# Patient Record
Sex: MALE | Race: WHITE | NOT HISPANIC OR LATINO | Employment: OTHER | URBAN - METROPOLITAN AREA
[De-identification: names, ages, dates, MRNs, and addresses within clinical notes are randomized per-mention and may not be internally consistent; named-entity substitution may affect disease eponyms.]

---

## 2019-10-03 ENCOUNTER — LAB VISIT (OUTPATIENT)
Dept: LAB | Facility: HOSPITAL | Age: 74
End: 2019-10-03
Attending: INTERNAL MEDICINE
Payer: MEDICARE

## 2019-10-03 DIAGNOSIS — Z79.899 NEED FOR PROPHYLACTIC CHEMOTHERAPY: ICD-10-CM

## 2019-10-03 DIAGNOSIS — I10 ESSENTIAL HYPERTENSION, MALIGNANT: Primary | ICD-10-CM

## 2019-10-03 DIAGNOSIS — E11.9 DIABETES MELLITUS WITHOUT COMPLICATION: ICD-10-CM

## 2019-10-03 DIAGNOSIS — E78.2 MIXED HYPERLIPIDEMIA: ICD-10-CM

## 2019-10-03 LAB
ALBUMIN SERPL BCP-MCNC: 4.1 G/DL (ref 3.5–5.2)
ALBUMIN/CREAT UR: 2.4 UG/MG (ref 0–30)
ALP SERPL-CCNC: 58 U/L (ref 55–135)
ALT SERPL W/O P-5'-P-CCNC: 24 U/L (ref 10–44)
ANION GAP SERPL CALC-SCNC: 4 MMOL/L (ref 8–16)
AST SERPL-CCNC: 22 U/L (ref 10–40)
BILIRUB DIRECT SERPL-MCNC: 0.2 MG/DL (ref 0.1–0.3)
BILIRUB SERPL-MCNC: 1.6 MG/DL (ref 0.1–1)
BUN SERPL-MCNC: 15 MG/DL (ref 8–23)
CALCIUM SERPL-MCNC: 9.2 MG/DL (ref 8.7–10.5)
CHLORIDE SERPL-SCNC: 104 MMOL/L (ref 95–110)
CHOLEST SERPL-MCNC: 135 MG/DL (ref 120–199)
CHOLEST/HDLC SERPL: 2.8 {RATIO} (ref 2–5)
CO2 SERPL-SCNC: 29 MMOL/L (ref 23–29)
CREAT SERPL-MCNC: 0.7 MG/DL (ref 0.5–1.4)
CREAT UR-MCNC: 90 MG/DL (ref 23–375)
EST. GFR  (AFRICAN AMERICAN): >60 ML/MIN/1.73 M^2
EST. GFR  (NON AFRICAN AMERICAN): >60 ML/MIN/1.73 M^2
ESTIMATED AVG GLUCOSE: 111 MG/DL (ref 68–131)
GLUCOSE SERPL-MCNC: 105 MG/DL (ref 70–110)
HBA1C MFR BLD HPLC: 5.5 % (ref 4.5–6.2)
HDLC SERPL-MCNC: 48 MG/DL (ref 40–75)
HDLC SERPL: 35.6 % (ref 20–50)
LDLC SERPL CALC-MCNC: 68.6 MG/DL (ref 63–159)
MICROALBUMIN UR DL<=1MG/L-MCNC: 2.2 UG/ML
NONHDLC SERPL-MCNC: 87 MG/DL
POTASSIUM SERPL-SCNC: 4.4 MMOL/L (ref 3.5–5.1)
PROT SERPL-MCNC: 6.5 G/DL (ref 6–8.4)
SODIUM SERPL-SCNC: 137 MMOL/L (ref 136–145)
TRIGL SERPL-MCNC: 92 MG/DL (ref 30–150)

## 2019-10-03 PROCEDURE — 83036 HEMOGLOBIN GLYCOSYLATED A1C: CPT

## 2019-10-03 PROCEDURE — 36415 COLL VENOUS BLD VENIPUNCTURE: CPT

## 2019-10-03 PROCEDURE — 80076 HEPATIC FUNCTION PANEL: CPT

## 2019-10-03 PROCEDURE — 80061 LIPID PANEL: CPT

## 2019-10-03 PROCEDURE — 80048 BASIC METABOLIC PNL TOTAL CA: CPT

## 2019-10-03 PROCEDURE — 82043 UR ALBUMIN QUANTITATIVE: CPT

## 2020-02-24 ENCOUNTER — LAB VISIT (OUTPATIENT)
Dept: LAB | Facility: HOSPITAL | Age: 75
End: 2020-02-24
Attending: INTERNAL MEDICINE
Payer: MEDICARE

## 2020-02-24 DIAGNOSIS — E78.2 MIXED HYPERLIPIDEMIA: ICD-10-CM

## 2020-02-24 DIAGNOSIS — E83.42 HYPOMAGNESEMIA: ICD-10-CM

## 2020-02-24 DIAGNOSIS — I11.9 MALIGNANT HYPERTENSIVE HEART DISEASE WITHOUT HEART FAILURE: ICD-10-CM

## 2020-02-24 DIAGNOSIS — Z79.899 ENCOUNTER FOR LONG-TERM (CURRENT) USE OF OTHER MEDICATIONS: ICD-10-CM

## 2020-02-24 DIAGNOSIS — I10 ESSENTIAL HYPERTENSION, MALIGNANT: Primary | ICD-10-CM

## 2020-02-24 LAB
ALBUMIN SERPL BCP-MCNC: 3.9 G/DL (ref 3.5–5.2)
ALBUMIN/CREAT UR: 3.3 UG/MG (ref 0–30)
ALP SERPL-CCNC: 47 U/L (ref 55–135)
ALT SERPL W/O P-5'-P-CCNC: 19 U/L (ref 10–44)
ANION GAP SERPL CALC-SCNC: 7 MMOL/L (ref 8–16)
AST SERPL-CCNC: 17 U/L (ref 10–40)
BILIRUB DIRECT SERPL-MCNC: 0.1 MG/DL (ref 0.1–0.3)
BILIRUB SERPL-MCNC: 1.1 MG/DL (ref 0.1–1)
BUN SERPL-MCNC: 22 MG/DL (ref 8–23)
CALCIUM SERPL-MCNC: 9.1 MG/DL (ref 8.7–10.5)
CHLORIDE SERPL-SCNC: 106 MMOL/L (ref 95–110)
CHOLEST SERPL-MCNC: 137 MG/DL (ref 120–199)
CHOLEST/HDLC SERPL: 3 {RATIO} (ref 2–5)
CO2 SERPL-SCNC: 28 MMOL/L (ref 23–29)
CREAT SERPL-MCNC: 0.7 MG/DL (ref 0.5–1.4)
CREAT UR-MCNC: 114 MG/DL (ref 23–375)
EST. GFR  (AFRICAN AMERICAN): >60 ML/MIN/1.73 M^2
EST. GFR  (NON AFRICAN AMERICAN): >60 ML/MIN/1.73 M^2
ESTIMATED AVG GLUCOSE: 105 MG/DL (ref 68–131)
GLUCOSE SERPL-MCNC: 98 MG/DL (ref 70–110)
HBA1C MFR BLD HPLC: 5.3 % (ref 4.5–6.2)
HDLC SERPL-MCNC: 46 MG/DL (ref 40–75)
HDLC SERPL: 33.6 % (ref 20–50)
LDLC SERPL CALC-MCNC: 65.4 MG/DL (ref 63–159)
MAGNESIUM SERPL-MCNC: 2 MG/DL (ref 1.6–2.6)
MICROALBUMIN UR DL<=1MG/L-MCNC: 3.8 UG/ML
NONHDLC SERPL-MCNC: 91 MG/DL
POTASSIUM SERPL-SCNC: 4.5 MMOL/L (ref 3.5–5.1)
PROT SERPL-MCNC: 6.5 G/DL (ref 6–8.4)
SODIUM SERPL-SCNC: 141 MMOL/L (ref 136–145)
TRIGL SERPL-MCNC: 128 MG/DL (ref 30–150)

## 2020-02-24 PROCEDURE — 36415 COLL VENOUS BLD VENIPUNCTURE: CPT

## 2020-02-24 PROCEDURE — 82043 UR ALBUMIN QUANTITATIVE: CPT

## 2020-02-24 PROCEDURE — 83735 ASSAY OF MAGNESIUM: CPT

## 2020-02-24 PROCEDURE — 80048 BASIC METABOLIC PNL TOTAL CA: CPT

## 2020-02-24 PROCEDURE — 80061 LIPID PANEL: CPT

## 2020-02-24 PROCEDURE — 80076 HEPATIC FUNCTION PANEL: CPT

## 2020-02-24 PROCEDURE — 83036 HEMOGLOBIN GLYCOSYLATED A1C: CPT

## 2020-08-31 ENCOUNTER — LAB VISIT (OUTPATIENT)
Dept: LAB | Facility: HOSPITAL | Age: 75
End: 2020-08-31
Attending: INTERNAL MEDICINE
Payer: MEDICARE

## 2020-08-31 DIAGNOSIS — E78.2 MIXED HYPERLIPIDEMIA: ICD-10-CM

## 2020-08-31 DIAGNOSIS — Z79.899 ENCOUNTER FOR LONG-TERM (CURRENT) USE OF OTHER MEDICATIONS: ICD-10-CM

## 2020-08-31 DIAGNOSIS — E11.9 DIABETES MELLITUS WITHOUT COMPLICATION: ICD-10-CM

## 2020-08-31 DIAGNOSIS — I10 ESSENTIAL HYPERTENSION, MALIGNANT: Primary | ICD-10-CM

## 2020-08-31 LAB
ALBUMIN SERPL BCP-MCNC: 4 G/DL (ref 3.5–5.2)
ALBUMIN/CREAT UR: 4 UG/MG (ref 0–30)
ALP SERPL-CCNC: 46 U/L (ref 55–135)
ALT SERPL W/O P-5'-P-CCNC: 24 U/L (ref 10–44)
ANION GAP SERPL CALC-SCNC: 9 MMOL/L (ref 8–16)
AST SERPL-CCNC: 20 U/L (ref 10–40)
BILIRUB DIRECT SERPL-MCNC: 0.2 MG/DL (ref 0.1–0.3)
BILIRUB SERPL-MCNC: 1.5 MG/DL (ref 0.1–1)
BUN SERPL-MCNC: 17 MG/DL (ref 8–23)
CALCIUM SERPL-MCNC: 9.3 MG/DL (ref 8.7–10.5)
CHLORIDE SERPL-SCNC: 106 MMOL/L (ref 95–110)
CHOLEST SERPL-MCNC: 135 MG/DL (ref 120–199)
CHOLEST/HDLC SERPL: 2.8 {RATIO} (ref 2–5)
CO2 SERPL-SCNC: 27 MMOL/L (ref 23–29)
CREAT SERPL-MCNC: 0.7 MG/DL (ref 0.5–1.4)
CREAT UR-MCNC: 134 MG/DL (ref 23–375)
EST. GFR  (AFRICAN AMERICAN): >60 ML/MIN/1.73 M^2
EST. GFR  (NON AFRICAN AMERICAN): >60 ML/MIN/1.73 M^2
ESTIMATED AVG GLUCOSE: 111 MG/DL (ref 68–131)
GLUCOSE SERPL-MCNC: 101 MG/DL (ref 70–110)
HBA1C MFR BLD HPLC: 5.5 % (ref 4.5–6.2)
HDLC SERPL-MCNC: 49 MG/DL (ref 40–75)
HDLC SERPL: 36.3 % (ref 20–50)
LDLC SERPL CALC-MCNC: 61.4 MG/DL (ref 63–159)
MICROALBUMIN UR DL<=1MG/L-MCNC: 5.4 UG/ML
NONHDLC SERPL-MCNC: 86 MG/DL
POTASSIUM SERPL-SCNC: 4.1 MMOL/L (ref 3.5–5.1)
PROT SERPL-MCNC: 6.3 G/DL (ref 6–8.4)
SODIUM SERPL-SCNC: 142 MMOL/L (ref 136–145)
TRIGL SERPL-MCNC: 123 MG/DL (ref 30–150)

## 2020-08-31 PROCEDURE — 82043 UR ALBUMIN QUANTITATIVE: CPT

## 2020-08-31 PROCEDURE — 80076 HEPATIC FUNCTION PANEL: CPT

## 2020-08-31 PROCEDURE — 80048 BASIC METABOLIC PNL TOTAL CA: CPT

## 2020-08-31 PROCEDURE — 36415 COLL VENOUS BLD VENIPUNCTURE: CPT

## 2020-08-31 PROCEDURE — 83036 HEMOGLOBIN GLYCOSYLATED A1C: CPT

## 2020-08-31 PROCEDURE — 80061 LIPID PANEL: CPT

## 2021-01-06 ENCOUNTER — PATIENT MESSAGE (OUTPATIENT)
Dept: ADMINISTRATIVE | Facility: OTHER | Age: 76
End: 2021-01-06

## 2021-01-22 ENCOUNTER — PATIENT MESSAGE (OUTPATIENT)
Dept: ADMINISTRATIVE | Facility: OTHER | Age: 76
End: 2021-01-22

## 2021-02-05 ENCOUNTER — IMMUNIZATION (OUTPATIENT)
Dept: PRIMARY CARE CLINIC | Facility: CLINIC | Age: 76
End: 2021-02-05
Payer: MEDICARE

## 2021-02-05 DIAGNOSIS — Z23 NEED FOR VACCINATION: Primary | ICD-10-CM

## 2021-02-05 PROCEDURE — 91300 COVID-19, MRNA, LNP-S, PF, 30 MCG/0.3 ML DOSE VACCINE: CPT | Mod: S$GLB,,, | Performed by: FAMILY MEDICINE

## 2021-02-05 PROCEDURE — 0001A COVID-19, MRNA, LNP-S, PF, 30 MCG/0.3 ML DOSE VACCINE: ICD-10-PCS | Mod: S$GLB,,, | Performed by: FAMILY MEDICINE

## 2021-02-05 PROCEDURE — 91300 COVID-19, MRNA, LNP-S, PF, 30 MCG/0.3 ML DOSE VACCINE: ICD-10-PCS | Mod: S$GLB,,, | Performed by: FAMILY MEDICINE

## 2021-02-05 PROCEDURE — 0001A COVID-19, MRNA, LNP-S, PF, 30 MCG/0.3 ML DOSE VACCINE: CPT | Mod: S$GLB,,, | Performed by: FAMILY MEDICINE

## 2021-02-19 ENCOUNTER — LAB VISIT (OUTPATIENT)
Dept: LAB | Facility: HOSPITAL | Age: 76
End: 2021-02-19
Attending: INTERNAL MEDICINE
Payer: MEDICARE

## 2021-02-19 DIAGNOSIS — I10 ESSENTIAL HYPERTENSION, MALIGNANT: Primary | ICD-10-CM

## 2021-02-19 DIAGNOSIS — Z12.5 SPECIAL SCREENING FOR MALIGNANT NEOPLASM OF PROSTATE: ICD-10-CM

## 2021-02-19 DIAGNOSIS — Z79.899 ENCOUNTER FOR LONG-TERM (CURRENT) USE OF OTHER MEDICATIONS: ICD-10-CM

## 2021-02-19 DIAGNOSIS — E78.2 MIXED HYPERLIPIDEMIA: ICD-10-CM

## 2021-02-19 DIAGNOSIS — E11.9 DIABETES MELLITUS WITHOUT COMPLICATION: ICD-10-CM

## 2021-02-19 LAB
ALBUMIN SERPL BCP-MCNC: 4.3 G/DL (ref 3.5–5.2)
ALBUMIN/CREAT UR: 6.9 UG/MG (ref 0–30)
ALP SERPL-CCNC: 53 U/L (ref 55–135)
ALT SERPL W/O P-5'-P-CCNC: 26 U/L (ref 10–44)
ANION GAP SERPL CALC-SCNC: 10 MMOL/L (ref 8–16)
AST SERPL-CCNC: 21 U/L (ref 10–40)
BILIRUB DIRECT SERPL-MCNC: 0.1 MG/DL (ref 0.1–0.3)
BILIRUB SERPL-MCNC: 1.3 MG/DL (ref 0.1–1)
BUN SERPL-MCNC: 18 MG/DL (ref 8–23)
CALCIUM SERPL-MCNC: 9.6 MG/DL (ref 8.7–10.5)
CHLORIDE SERPL-SCNC: 105 MMOL/L (ref 95–110)
CHOLEST SERPL-MCNC: 134 MG/DL (ref 120–199)
CHOLEST/HDLC SERPL: 2.9 {RATIO} (ref 2–5)
CO2 SERPL-SCNC: 27 MMOL/L (ref 23–29)
COMPLEXED PSA SERPL-MCNC: 0.92 NG/ML (ref 0–4)
CREAT SERPL-MCNC: 0.7 MG/DL (ref 0.5–1.4)
CREAT UR-MCNC: 155 MG/DL (ref 23–375)
EST. GFR  (AFRICAN AMERICAN): >60 ML/MIN/1.73 M^2
EST. GFR  (NON AFRICAN AMERICAN): >60 ML/MIN/1.73 M^2
ESTIMATED AVG GLUCOSE: 103 MG/DL (ref 68–131)
GLUCOSE SERPL-MCNC: 107 MG/DL (ref 70–110)
HBA1C MFR BLD: 5.2 % (ref 4.5–6.2)
HDLC SERPL-MCNC: 46 MG/DL (ref 40–75)
HDLC SERPL: 34.3 % (ref 20–50)
LDLC SERPL CALC-MCNC: 62.6 MG/DL (ref 63–159)
MICROALBUMIN UR DL<=1MG/L-MCNC: 10.7 UG/ML
NONHDLC SERPL-MCNC: 88 MG/DL
POTASSIUM SERPL-SCNC: 4.4 MMOL/L (ref 3.5–5.1)
PROT SERPL-MCNC: 7 G/DL (ref 6–8.4)
SODIUM SERPL-SCNC: 142 MMOL/L (ref 136–145)
TRIGL SERPL-MCNC: 127 MG/DL (ref 30–150)

## 2021-02-19 PROCEDURE — 80048 BASIC METABOLIC PNL TOTAL CA: CPT

## 2021-02-19 PROCEDURE — 82043 UR ALBUMIN QUANTITATIVE: CPT

## 2021-02-19 PROCEDURE — 36415 COLL VENOUS BLD VENIPUNCTURE: CPT

## 2021-02-19 PROCEDURE — 84153 ASSAY OF PSA TOTAL: CPT

## 2021-02-19 PROCEDURE — 82570 ASSAY OF URINE CREATININE: CPT

## 2021-02-19 PROCEDURE — 83036 HEMOGLOBIN GLYCOSYLATED A1C: CPT

## 2021-02-19 PROCEDURE — 80061 LIPID PANEL: CPT

## 2021-02-19 PROCEDURE — 80076 HEPATIC FUNCTION PANEL: CPT

## 2021-02-26 ENCOUNTER — IMMUNIZATION (OUTPATIENT)
Dept: PRIMARY CARE CLINIC | Facility: CLINIC | Age: 76
End: 2021-02-26
Payer: MEDICARE

## 2021-02-26 DIAGNOSIS — Z23 NEED FOR VACCINATION: Primary | ICD-10-CM

## 2021-02-26 PROCEDURE — 0002A COVID-19, MRNA, LNP-S, PF, 30 MCG/0.3 ML DOSE VACCINE: ICD-10-PCS | Mod: S$GLB,,, | Performed by: FAMILY MEDICINE

## 2021-02-26 PROCEDURE — 91300 COVID-19, MRNA, LNP-S, PF, 30 MCG/0.3 ML DOSE VACCINE: ICD-10-PCS | Mod: S$GLB,,, | Performed by: FAMILY MEDICINE

## 2021-02-26 PROCEDURE — 91300 COVID-19, MRNA, LNP-S, PF, 30 MCG/0.3 ML DOSE VACCINE: CPT | Mod: S$GLB,,, | Performed by: FAMILY MEDICINE

## 2021-02-26 PROCEDURE — 0002A COVID-19, MRNA, LNP-S, PF, 30 MCG/0.3 ML DOSE VACCINE: CPT | Mod: S$GLB,,, | Performed by: FAMILY MEDICINE

## 2021-03-11 ENCOUNTER — OFFICE VISIT (OUTPATIENT)
Dept: CARDIOLOGY | Facility: CLINIC | Age: 76
End: 2021-03-11
Payer: MEDICARE

## 2021-03-11 VITALS
HEART RATE: 52 BPM | OXYGEN SATURATION: 95 % | WEIGHT: 190 LBS | HEIGHT: 70 IN | RESPIRATION RATE: 18 BRPM | BODY MASS INDEX: 27.2 KG/M2 | SYSTOLIC BLOOD PRESSURE: 138 MMHG | DIASTOLIC BLOOD PRESSURE: 72 MMHG

## 2021-03-11 DIAGNOSIS — I10 BENIGN HYPERTENSION: ICD-10-CM

## 2021-03-11 DIAGNOSIS — I48.0 PAROXYSMAL ATRIAL FIBRILLATION: Primary | ICD-10-CM

## 2021-03-11 DIAGNOSIS — I25.10 CORONARY ARTERY DISEASE INVOLVING NATIVE CORONARY ARTERY OF NATIVE HEART WITHOUT ANGINA PECTORIS: ICD-10-CM

## 2021-03-11 DIAGNOSIS — Z78.9 STATIN INTOLERANCE: ICD-10-CM

## 2021-03-11 DIAGNOSIS — E78.5 HYPERLIPIDEMIA WITH TARGET LOW DENSITY LIPOPROTEIN (LDL) CHOLESTEROL LESS THAN 70 MG/DL: ICD-10-CM

## 2021-03-11 PROCEDURE — 99213 PR OFFICE/OUTPT VISIT, EST, LEVL III, 20-29 MIN: ICD-10-PCS | Mod: S$GLB,,, | Performed by: INTERNAL MEDICINE

## 2021-03-11 PROCEDURE — 99213 OFFICE O/P EST LOW 20 MIN: CPT | Mod: S$GLB,,, | Performed by: INTERNAL MEDICINE

## 2021-03-11 RX ORDER — HYDROCHLOROTHIAZIDE 12.5 MG/1
TABLET ORAL
COMMUNITY
Start: 2021-01-18 | End: 2022-04-04

## 2021-04-26 ENCOUNTER — HOSPITAL ENCOUNTER (OUTPATIENT)
Dept: RADIOLOGY | Facility: HOSPITAL | Age: 76
Discharge: HOME | End: 2021-04-26
Payer: MEDICARE

## 2021-04-26 DIAGNOSIS — M79.641 BILATERAL HAND PAIN: Primary | ICD-10-CM

## 2021-04-26 DIAGNOSIS — M79.641 BILATERAL HAND PAIN: ICD-10-CM

## 2021-04-26 DIAGNOSIS — M79.642 BILATERAL HAND PAIN: ICD-10-CM

## 2021-04-26 DIAGNOSIS — M79.642 BILATERAL HAND PAIN: Primary | ICD-10-CM

## 2021-04-26 PROCEDURE — 73130 X-RAY EXAM OF HAND: CPT | Mod: TC,50

## 2021-08-20 ENCOUNTER — LAB VISIT (OUTPATIENT)
Dept: LAB | Facility: HOSPITAL | Age: 76
End: 2021-08-20
Attending: INTERNAL MEDICINE
Payer: MEDICARE

## 2021-08-20 DIAGNOSIS — I10 ESSENTIAL HYPERTENSION, MALIGNANT: Primary | ICD-10-CM

## 2021-08-20 DIAGNOSIS — Z79.899 ENCOUNTER FOR LONG-TERM (CURRENT) USE OF OTHER MEDICATIONS: ICD-10-CM

## 2021-08-20 DIAGNOSIS — E78.2 MIXED HYPERLIPIDEMIA: ICD-10-CM

## 2021-08-20 DIAGNOSIS — E11.9 DIABETES MELLITUS WITHOUT COMPLICATION: ICD-10-CM

## 2021-08-20 LAB
ALBUMIN SERPL BCP-MCNC: 4.2 G/DL (ref 3.5–5.2)
ALBUMIN/CREAT UR: NORMAL UG/MG (ref 0–30)
ALP SERPL-CCNC: 44 U/L (ref 55–135)
ALT SERPL W/O P-5'-P-CCNC: 24 U/L (ref 10–44)
ANION GAP SERPL CALC-SCNC: 9 MMOL/L (ref 8–16)
AST SERPL-CCNC: 21 U/L (ref 10–40)
BILIRUB DIRECT SERPL-MCNC: 0.2 MG/DL (ref 0.1–0.3)
BILIRUB SERPL-MCNC: 1.5 MG/DL (ref 0.1–1)
BUN SERPL-MCNC: 14 MG/DL (ref 8–23)
CALCIUM SERPL-MCNC: 9.2 MG/DL (ref 8.7–10.5)
CHLORIDE SERPL-SCNC: 104 MMOL/L (ref 95–110)
CHOLEST SERPL-MCNC: 143 MG/DL (ref 120–199)
CHOLEST/HDLC SERPL: 3 {RATIO} (ref 2–5)
CO2 SERPL-SCNC: 27 MMOL/L (ref 23–29)
CREAT SERPL-MCNC: 0.7 MG/DL (ref 0.5–1.4)
CREAT UR-MCNC: 129 MG/DL (ref 23–375)
EST. GFR  (AFRICAN AMERICAN): >60 ML/MIN/1.73 M^2
EST. GFR  (NON AFRICAN AMERICAN): >60 ML/MIN/1.73 M^2
ESTIMATED AVG GLUCOSE: 120 MG/DL (ref 68–131)
GLUCOSE SERPL-MCNC: 106 MG/DL (ref 70–110)
HBA1C MFR BLD: 5.8 % (ref 4.5–6.2)
HDLC SERPL-MCNC: 48 MG/DL (ref 40–75)
HDLC SERPL: 33.6 % (ref 20–50)
LDLC SERPL CALC-MCNC: 72.4 MG/DL (ref 63–159)
MICROALBUMIN UR DL<=1MG/L-MCNC: <2 UG/ML
NONHDLC SERPL-MCNC: 95 MG/DL
POTASSIUM SERPL-SCNC: 4 MMOL/L (ref 3.5–5.1)
PROT SERPL-MCNC: 6.8 G/DL (ref 6–8.4)
SODIUM SERPL-SCNC: 140 MMOL/L (ref 136–145)
TRIGL SERPL-MCNC: 113 MG/DL (ref 30–150)

## 2021-08-20 PROCEDURE — 36415 COLL VENOUS BLD VENIPUNCTURE: CPT | Performed by: INTERNAL MEDICINE

## 2021-08-20 PROCEDURE — 80076 HEPATIC FUNCTION PANEL: CPT | Performed by: INTERNAL MEDICINE

## 2021-08-20 PROCEDURE — 83036 HEMOGLOBIN GLYCOSYLATED A1C: CPT | Performed by: INTERNAL MEDICINE

## 2021-08-20 PROCEDURE — 80061 LIPID PANEL: CPT | Performed by: INTERNAL MEDICINE

## 2021-08-20 PROCEDURE — 82570 ASSAY OF URINE CREATININE: CPT | Performed by: INTERNAL MEDICINE

## 2021-08-20 PROCEDURE — 80048 BASIC METABOLIC PNL TOTAL CA: CPT | Performed by: INTERNAL MEDICINE

## 2021-09-07 DIAGNOSIS — Z79.899 ENCOUNTER FOR LONG-TERM (CURRENT) USE OF OTHER MEDICATIONS: ICD-10-CM

## 2021-09-07 DIAGNOSIS — I10 ESSENTIAL HYPERTENSION, MALIGNANT: Primary | ICD-10-CM

## 2021-09-07 DIAGNOSIS — E78.2 MIXED HYPERLIPIDEMIA: ICD-10-CM

## 2021-09-07 DIAGNOSIS — E11.9 DIABETES MELLITUS WITHOUT COMPLICATION: ICD-10-CM

## 2021-09-14 ENCOUNTER — TELEPHONE (OUTPATIENT)
Dept: CARDIOLOGY | Facility: CLINIC | Age: 76
End: 2021-09-14

## 2021-09-14 ENCOUNTER — OFFICE VISIT (OUTPATIENT)
Dept: CARDIOLOGY | Facility: CLINIC | Age: 76
End: 2021-09-14
Payer: MEDICARE

## 2021-09-14 VITALS
DIASTOLIC BLOOD PRESSURE: 68 MMHG | BODY MASS INDEX: 27.49 KG/M2 | HEART RATE: 63 BPM | OXYGEN SATURATION: 97 % | RESPIRATION RATE: 16 BRPM | WEIGHT: 192 LBS | HEIGHT: 70 IN | SYSTOLIC BLOOD PRESSURE: 122 MMHG

## 2021-09-14 DIAGNOSIS — I25.10 CORONARY ARTERY DISEASE INVOLVING NATIVE CORONARY ARTERY OF NATIVE HEART WITHOUT ANGINA PECTORIS: ICD-10-CM

## 2021-09-14 DIAGNOSIS — E78.5 HYPERLIPIDEMIA WITH TARGET LOW DENSITY LIPOPROTEIN (LDL) CHOLESTEROL LESS THAN 70 MG/DL: ICD-10-CM

## 2021-09-14 DIAGNOSIS — I48.0 PAROXYSMAL ATRIAL FIBRILLATION: ICD-10-CM

## 2021-09-14 DIAGNOSIS — Z78.9 STATIN INTOLERANCE: ICD-10-CM

## 2021-09-14 PROCEDURE — 99213 PR OFFICE/OUTPT VISIT, EST, LEVL III, 20-29 MIN: ICD-10-PCS | Mod: S$GLB,,, | Performed by: INTERNAL MEDICINE

## 2021-09-14 PROCEDURE — 99213 OFFICE O/P EST LOW 20 MIN: CPT | Mod: S$GLB,,, | Performed by: INTERNAL MEDICINE

## 2021-09-27 ENCOUNTER — IMMUNIZATION (OUTPATIENT)
Dept: PRIMARY CARE CLINIC | Facility: CLINIC | Age: 76
End: 2021-09-27
Payer: MEDICARE

## 2021-09-27 DIAGNOSIS — Z23 NEED FOR VACCINATION: Primary | ICD-10-CM

## 2021-09-27 PROCEDURE — 0003A COVID-19, MRNA, LNP-S, PF, 30 MCG/0.3 ML DOSE VACCINE: CPT | Mod: S$GLB,,, | Performed by: FAMILY MEDICINE

## 2021-09-27 PROCEDURE — 91300 COVID-19, MRNA, LNP-S, PF, 30 MCG/0.3 ML DOSE VACCINE: ICD-10-PCS | Mod: S$GLB,,, | Performed by: FAMILY MEDICINE

## 2021-09-27 PROCEDURE — 0003A COVID-19, MRNA, LNP-S, PF, 30 MCG/0.3 ML DOSE VACCINE: ICD-10-PCS | Mod: S$GLB,,, | Performed by: FAMILY MEDICINE

## 2021-09-27 PROCEDURE — 91300 COVID-19, MRNA, LNP-S, PF, 30 MCG/0.3 ML DOSE VACCINE: CPT | Mod: S$GLB,,, | Performed by: FAMILY MEDICINE

## 2021-10-21 ENCOUNTER — TELEPHONE (OUTPATIENT)
Dept: CARDIOLOGY | Facility: CLINIC | Age: 76
End: 2021-10-21

## 2022-01-03 ENCOUNTER — LAB VISIT (OUTPATIENT)
Dept: LAB | Facility: HOSPITAL | Age: 77
End: 2022-01-03
Attending: INTERNAL MEDICINE
Payer: MEDICARE

## 2022-01-03 DIAGNOSIS — E78.2 MIXED HYPERLIPIDEMIA: ICD-10-CM

## 2022-01-03 DIAGNOSIS — E11.9 DIABETES MELLITUS WITHOUT COMPLICATION: ICD-10-CM

## 2022-01-03 DIAGNOSIS — Z79.899 ENCOUNTER FOR LONG-TERM (CURRENT) USE OF OTHER MEDICATIONS: ICD-10-CM

## 2022-01-03 DIAGNOSIS — I10 ESSENTIAL HYPERTENSION, MALIGNANT: ICD-10-CM

## 2022-01-03 LAB
ALBUMIN SERPL BCP-MCNC: 4.2 G/DL (ref 3.5–5.2)
ALP SERPL-CCNC: 46 U/L (ref 55–135)
ALT SERPL W/O P-5'-P-CCNC: 21 U/L (ref 10–44)
ANION GAP SERPL CALC-SCNC: 10 MMOL/L (ref 8–16)
AST SERPL-CCNC: 19 U/L (ref 10–40)
BILIRUB DIRECT SERPL-MCNC: 0.2 MG/DL (ref 0.1–0.3)
BILIRUB SERPL-MCNC: 1.4 MG/DL (ref 0.1–1)
BUN SERPL-MCNC: 13 MG/DL (ref 8–23)
CALCIUM SERPL-MCNC: 9.4 MG/DL (ref 8.7–10.5)
CHLORIDE SERPL-SCNC: 102 MMOL/L (ref 95–110)
CHOLEST SERPL-MCNC: 139 MG/DL (ref 120–199)
CHOLEST/HDLC SERPL: 3 {RATIO} (ref 2–5)
CO2 SERPL-SCNC: 28 MMOL/L (ref 23–29)
CREAT SERPL-MCNC: 0.8 MG/DL (ref 0.5–1.4)
EST. GFR  (AFRICAN AMERICAN): >60 ML/MIN/1.73 M^2
EST. GFR  (NON AFRICAN AMERICAN): >60 ML/MIN/1.73 M^2
ESTIMATED AVG GLUCOSE: 108 MG/DL (ref 68–131)
GLUCOSE SERPL-MCNC: 104 MG/DL (ref 70–110)
HBA1C MFR BLD: 5.4 % (ref 4.5–6.2)
HDLC SERPL-MCNC: 47 MG/DL (ref 40–75)
HDLC SERPL: 33.8 % (ref 20–50)
LDLC SERPL CALC-MCNC: 58.8 MG/DL (ref 63–159)
NONHDLC SERPL-MCNC: 92 MG/DL
POTASSIUM SERPL-SCNC: 4.3 MMOL/L (ref 3.5–5.1)
PROT SERPL-MCNC: 7.1 G/DL (ref 6–8.4)
SODIUM SERPL-SCNC: 140 MMOL/L (ref 136–145)
TRIGL SERPL-MCNC: 166 MG/DL (ref 30–150)

## 2022-01-03 PROCEDURE — 80048 BASIC METABOLIC PNL TOTAL CA: CPT | Performed by: INTERNAL MEDICINE

## 2022-01-03 PROCEDURE — 36415 COLL VENOUS BLD VENIPUNCTURE: CPT | Performed by: INTERNAL MEDICINE

## 2022-01-03 PROCEDURE — 80076 HEPATIC FUNCTION PANEL: CPT | Performed by: INTERNAL MEDICINE

## 2022-01-03 PROCEDURE — 83036 HEMOGLOBIN GLYCOSYLATED A1C: CPT | Performed by: INTERNAL MEDICINE

## 2022-01-03 PROCEDURE — 80061 LIPID PANEL: CPT | Performed by: INTERNAL MEDICINE

## 2022-02-04 RX ORDER — EVOLOCUMAB 140 MG/ML
INJECTION, SOLUTION SUBCUTANEOUS
Qty: 6 ML | Refills: 3 | Status: SHIPPED | OUTPATIENT
Start: 2022-02-04 | End: 2022-10-19

## 2022-03-14 ENCOUNTER — OFFICE VISIT (OUTPATIENT)
Dept: CARDIOLOGY | Facility: CLINIC | Age: 77
End: 2022-03-14
Payer: MEDICARE

## 2022-03-14 VITALS
BODY MASS INDEX: 27.49 KG/M2 | OXYGEN SATURATION: 97 % | WEIGHT: 192 LBS | HEART RATE: 64 BPM | SYSTOLIC BLOOD PRESSURE: 130 MMHG | HEIGHT: 70 IN | DIASTOLIC BLOOD PRESSURE: 74 MMHG

## 2022-03-14 DIAGNOSIS — Z78.9 STATIN INTOLERANCE: ICD-10-CM

## 2022-03-14 DIAGNOSIS — I10 BENIGN HYPERTENSION: ICD-10-CM

## 2022-03-14 DIAGNOSIS — E78.5 HYPERLIPIDEMIA WITH TARGET LOW DENSITY LIPOPROTEIN (LDL) CHOLESTEROL LESS THAN 70 MG/DL: ICD-10-CM

## 2022-03-14 DIAGNOSIS — I48.0 PAROXYSMAL ATRIAL FIBRILLATION: ICD-10-CM

## 2022-03-14 DIAGNOSIS — I25.10 CORONARY ARTERY DISEASE INVOLVING NATIVE CORONARY ARTERY OF NATIVE HEART WITHOUT ANGINA PECTORIS: ICD-10-CM

## 2022-03-14 PROCEDURE — 99213 PR OFFICE/OUTPT VISIT, EST, LEVL III, 20-29 MIN: ICD-10-PCS | Mod: S$GLB,,, | Performed by: INTERNAL MEDICINE

## 2022-03-14 PROCEDURE — 99213 OFFICE O/P EST LOW 20 MIN: CPT | Mod: S$GLB,,, | Performed by: INTERNAL MEDICINE

## 2022-03-14 NOTE — PROGRESS NOTES
Subjective:    Patient ID:  Taco Odonnell Jr is a 76 y.o. male patient here for evaluation Atrial Fibrillation, Hyperlipidemia, Hypertension, and Coronary Artery Disease      History of Present Illness:     Patient is here for follow-up evaluation doing very well denies episodes of angina no shortness of breath no swelling in the lower extremities.  He is walking 5 miles on a daily basis.  He feels like he is not seeing much fluctuation in his weight.  Clinically he feels good he had blood work done in January.  LDL cholesterol is improved significantly      Review of patient's allergies indicates:   Allergen Reactions    Penicillin g Other (See Comments)     Showed on allergy test        Past Medical History:   Diagnosis Date    Benign hypertension     Coronary artery disease     Glaucoma     Hyperlipidemia LDL goal < 70      Past Surgical History:   Procedure Laterality Date    CORONARY ARTERY BYPASS GRAFT      ROTATOR CUFF REPAIR       Social History     Tobacco Use    Smoking status: Former Smoker     Packs/day: 1.00     Years: 40.00     Pack years: 40.00     Types: Cigarettes     Quit date: 2002     Years since quittin.2    Smokeless tobacco: Never Used   Substance Use Topics    Alcohol use: No    Drug use: No        Review of Systems:    As noted in HPI in addition      REVIEW OF SYSTEMS  CARDIOVASCULAR: No recent chest pain, palpitations, arm, neck, or jaw pain  RESPIRATORY: No recent fever, cough chills, SOB or congestion  : No blood in the urine  GI: No Nausea, vomiting, constipation, diarrhea, blood, or reflux.  MUSCULOSKELETAL: No myalgias  NEURO: No lightheadedness or dizziness  EYES: No Double vision, blurry, vision or headache              Objective        Vitals:    22 1540   BP: 130/74   Pulse: 64       LIPIDS - LAST 2   Lab Results   Component Value Date    CHOL 139 2022    CHOL 143 2021    HDL 47 2022    HDL 48 2021    LDLCALC 58.8 (L)  01/03/2022    LDLCALC 72.4 08/20/2021    TRIG 166 (H) 01/03/2022    TRIG 113 08/20/2021    CHOLHDL 33.8 01/03/2022    CHOLHDL 33.6 08/20/2021       CBC - LAST 2  No results found for: WBC, RBC, HGB, HCT, MCV, MCH, MCHC, RDW, PLT, MPV, GRAN, LYMPH, MONO, BASO, NRBC    CHEMISTRY & LIVER FUNCTION - LAST 2  Lab Results   Component Value Date     01/03/2022     08/20/2021    K 4.3 01/03/2022    K 4.0 08/20/2021     01/03/2022     08/20/2021    CO2 28 01/03/2022    CO2 27 08/20/2021    ANIONGAP 10 01/03/2022    ANIONGAP 9 08/20/2021    BUN 13 01/03/2022    BUN 14 08/20/2021    CREATININE 0.8 01/03/2022    CREATININE 0.7 08/20/2021     01/03/2022     08/20/2021    CALCIUM 9.4 01/03/2022    CALCIUM 9.2 08/20/2021    MG 2.0 02/24/2020    ALBUMIN 4.2 01/03/2022    ALBUMIN 4.2 08/20/2021    PROT 7.1 01/03/2022    PROT 6.8 08/20/2021    ALKPHOS 46 (L) 01/03/2022    ALKPHOS 44 (L) 08/20/2021    ALT 21 01/03/2022    ALT 24 08/20/2021    AST 19 01/03/2022    AST 21 08/20/2021    BILITOT 1.4 (H) 01/03/2022    BILITOT 1.5 (H) 08/20/2021        CARDIAC PROFILE - LAST 2  Lab Results   Component Value Date     (H) 04/26/2021        COAGULATION - LAST 2  No results found for: LABPT, INR, APTT    ENDOCRINE & PSA - LAST 2  Lab Results   Component Value Date    HGBA1C 5.4 01/03/2022    HGBA1C 5.8 08/20/2021    PSA 0.92 02/19/2021        ECHOCARDIOGRAM RESULTS  No results found for this or any previous visit.      CURRENT/PREVIOUS VISIT EKG  No results found for this or any previous visit.  No valid procedures specified.   No results found for this or any previous visit.    No valid procedures specified.    PHYSICAL EXAM  CONSTITUTIONAL: Well built, well nourished in no apparent distress  NECK: no carotid bruit, no JVD  LUNGS: CTA  CHEST WALL: no tenderness  HEART: regular rate and rhythm, S1, S2 normal, no murmur, click, rub or gallop   ABDOMEN: soft, non-tender; bowel sounds normal; no masses,   no organomegaly  EXTREMITIES: Extremities normal, no edema, no calf tenderness noted  NEURO: AAO X 3    I HAVE REVIEWED :    The vital signs, nurses notes, and all the pertinent radiology and labs.        Current Outpatient Medications   Medication Instructions    aspirin (ECOTRIN) 81 mg, Oral, Daily,      coenzyme Q10 200 mg, Oral, Daily,      evolocumab (REPATHA SURECLICK) 140 mg/mL PnIj INJECT 1 ML SUBCUTANEOUSLY EVERY TWO WEEKS    FLAXSEED OIL ORAL 1,200 mg, Oral, 2 times daily,      hydroCHLOROthiazide (HYDRODIURIL) 12.5 MG Tab No dose, route, or frequency recorded.    latanoprost (XALATAN) 0.005 % ophthalmic solution 1 drop, Both Eyes, Nightly,      multivitamin capsule 1 capsule, Oral, Daily,      rivaroxaban (XARELTO) 20 mg, Oral, Daily    sotaloL (BETAPACE) 80 MG tablet Take 1 tablet by mouth twice daily    terazosin (HYTRIN) 5 MG capsule Take 1 capsule by mouth at bedtime          Assessment & Plan     Coronary artery disease   Continue on aggressive risk factor modification.  Maintain on aspirin and statin therapy with a Xarelto 20 mg a day.  He appears fairly stable on this continue th    Benign hypertension   Blood pressure is stable at 1 30/74 mmHg and he is very well controlled on this.  Maintain low-fat low-cholesterol diet he is taking Hydrea Diuril at 12.5 mg daily. Also continue on Hytrin 5 mg at bedtime    Hyperlipidemia LDL goal < 70    Continue on under Repatha injections at 140 my are mg per mL are Q 2 weeks.  LDL cholesterol is very well controlled with this down to 58    Paroxysmal atrial fibrillation   He has been maintaining regular rhythm.  Continue Xarelto 20 mg a day and Betapace 80 mg daily and he is also using baby aspirin for coronary disease.  Watch her for any bleeding tendencies otherwise maintain the same regimen    Statin intolerance   Although he has statin intolerance he is tolerating Repatha fortunately fairly well.  And currently LDL is therape          No  follow-ups on file.

## 2022-03-14 NOTE — ASSESSMENT & PLAN NOTE
Continue on aggressive risk factor modification.  Maintain on aspirin and statin therapy with a Xarelto 20 mg a day.  He appears fairly stable on this continue th

## 2022-03-14 NOTE — ASSESSMENT & PLAN NOTE
Continue on under Repatha injections at 140 my are mg per mL are Q 2 weeks.  LDL cholesterol is very well controlled with this down to 58

## 2022-03-14 NOTE — ASSESSMENT & PLAN NOTE
Although he has statin intolerance he is tolerating Repatha fortunately fairly well.  And currently LDL is therape

## 2022-03-14 NOTE — ASSESSMENT & PLAN NOTE
Blood pressure is stable at 1 30/74 mmHg and he is very well controlled on this.  Maintain low-fat low-cholesterol diet he is taking Hydrea Diuril at 12.5 mg daily. Also continue on Hytrin 5 mg at bedtime

## 2022-03-14 NOTE — ASSESSMENT & PLAN NOTE
He has been maintaining regular rhythm.  Continue Xarelto 20 mg a day and Betapace 80 mg daily and he is also using baby aspirin for coronary disease.  Watch her for any bleeding tendencies otherwise maintain the same regimen

## 2022-05-05 ENCOUNTER — IMMUNIZATION (OUTPATIENT)
Dept: PRIMARY CARE CLINIC | Facility: CLINIC | Age: 77
End: 2022-05-05
Payer: MEDICARE

## 2022-05-05 DIAGNOSIS — Z23 NEED FOR VACCINATION: Primary | ICD-10-CM

## 2022-05-05 PROCEDURE — 0054A COVID-19, MRNA, LNP-S, PF, 30 MCG/0.3 ML DOSE VACCINE (PFIZER): CPT | Mod: S$GLB,,, | Performed by: FAMILY MEDICINE

## 2022-05-05 PROCEDURE — 91305 COVID-19, MRNA, LNP-S, PF, 30 MCG/0.3 ML DOSE VACCINE (PFIZER): ICD-10-PCS | Mod: S$GLB,,, | Performed by: FAMILY MEDICINE

## 2022-05-05 PROCEDURE — 91305 COVID-19, MRNA, LNP-S, PF, 30 MCG/0.3 ML DOSE VACCINE (PFIZER): CPT | Mod: S$GLB,,, | Performed by: FAMILY MEDICINE

## 2022-05-05 PROCEDURE — 0054A COVID-19, MRNA, LNP-S, PF, 30 MCG/0.3 ML DOSE VACCINE (PFIZER): ICD-10-PCS | Mod: S$GLB,,, | Performed by: FAMILY MEDICINE

## 2022-07-05 ENCOUNTER — LAB VISIT (OUTPATIENT)
Dept: LAB | Facility: HOSPITAL | Age: 77
End: 2022-07-05
Attending: INTERNAL MEDICINE
Payer: MEDICARE

## 2022-07-05 DIAGNOSIS — Z12.5 SPECIAL SCREENING FOR MALIGNANT NEOPLASM OF PROSTATE: ICD-10-CM

## 2022-07-05 DIAGNOSIS — E78.2 MIXED HYPERLIPIDEMIA: ICD-10-CM

## 2022-07-05 DIAGNOSIS — E11.9 DIABETES MELLITUS WITHOUT COMPLICATION: ICD-10-CM

## 2022-07-05 DIAGNOSIS — Z79.899 ENCOUNTER FOR LONG-TERM (CURRENT) USE OF OTHER MEDICATIONS: ICD-10-CM

## 2022-07-05 DIAGNOSIS — I10 ESSENTIAL HYPERTENSION, MALIGNANT: Primary | ICD-10-CM

## 2022-07-05 DIAGNOSIS — D50.0 IRON DEFICIENCY ANEMIA SECONDARY TO BLOOD LOSS (CHRONIC): ICD-10-CM

## 2022-07-05 LAB
ALBUMIN SERPL BCP-MCNC: 4.3 G/DL (ref 3.5–5.2)
ALP SERPL-CCNC: 45 U/L (ref 55–135)
ALT SERPL W/O P-5'-P-CCNC: 23 U/L (ref 10–44)
ANION GAP SERPL CALC-SCNC: 10 MMOL/L (ref 8–16)
AST SERPL-CCNC: 21 U/L (ref 10–40)
BASOPHILS # BLD AUTO: 0.05 K/UL (ref 0–0.2)
BASOPHILS NFR BLD: 0.9 % (ref 0–1.9)
BILIRUB DIRECT SERPL-MCNC: 0.2 MG/DL (ref 0.1–0.3)
BILIRUB SERPL-MCNC: 1.6 MG/DL (ref 0.1–1)
BUN SERPL-MCNC: 12 MG/DL (ref 8–23)
CALCIUM SERPL-MCNC: 8.9 MG/DL (ref 8.7–10.5)
CHLORIDE SERPL-SCNC: 102 MMOL/L (ref 95–110)
CHOLEST SERPL-MCNC: 136 MG/DL (ref 120–199)
CHOLEST/HDLC SERPL: 3.1 {RATIO} (ref 2–5)
CO2 SERPL-SCNC: 25 MMOL/L (ref 23–29)
COMPLEXED PSA SERPL-MCNC: 1.3 NG/ML (ref 0–4)
CREAT SERPL-MCNC: 0.6 MG/DL (ref 0.5–1.4)
DIFFERENTIAL METHOD: ABNORMAL
EOSINOPHIL # BLD AUTO: 0.1 K/UL (ref 0–0.5)
EOSINOPHIL NFR BLD: 1.5 % (ref 0–8)
ERYTHROCYTE [DISTWIDTH] IN BLOOD BY AUTOMATED COUNT: 11.9 % (ref 11.5–14.5)
EST. GFR  (AFRICAN AMERICAN): >60 ML/MIN/1.73 M^2
EST. GFR  (NON AFRICAN AMERICAN): >60 ML/MIN/1.73 M^2
ESTIMATED AVG GLUCOSE: 105 MG/DL (ref 68–131)
FERRITIN SERPL-MCNC: 357 NG/ML (ref 20–300)
GLUCOSE SERPL-MCNC: 107 MG/DL (ref 70–110)
HBA1C MFR BLD: 5.3 % (ref 4.5–6.2)
HCT VFR BLD AUTO: 42.9 % (ref 40–54)
HDLC SERPL-MCNC: 44 MG/DL (ref 40–75)
HDLC SERPL: 32.4 % (ref 20–50)
HGB BLD-MCNC: 14.9 G/DL (ref 14–18)
IMM GRANULOCYTES # BLD AUTO: 0.02 K/UL (ref 0–0.04)
IMM GRANULOCYTES NFR BLD AUTO: 0.3 % (ref 0–0.5)
IRON SERPL-MCNC: 148 UG/DL (ref 45–160)
LDLC SERPL CALC-MCNC: 58 MG/DL (ref 63–159)
LYMPHOCYTES # BLD AUTO: 1.5 K/UL (ref 1–4.8)
LYMPHOCYTES NFR BLD: 26.5 % (ref 18–48)
MCH RBC QN AUTO: 32.6 PG (ref 27–31)
MCHC RBC AUTO-ENTMCNC: 34.7 G/DL (ref 32–36)
MCV RBC AUTO: 94 FL (ref 82–98)
MONOCYTES # BLD AUTO: 0.4 K/UL (ref 0.3–1)
MONOCYTES NFR BLD: 7.2 % (ref 4–15)
NEUTROPHILS # BLD AUTO: 3.7 K/UL (ref 1.8–7.7)
NEUTROPHILS NFR BLD: 63.6 % (ref 38–73)
NONHDLC SERPL-MCNC: 92 MG/DL
NRBC BLD-RTO: 0 /100 WBC
PLATELET # BLD AUTO: 195 K/UL (ref 150–450)
PMV BLD AUTO: 10.5 FL (ref 9.2–12.9)
POTASSIUM SERPL-SCNC: 3.7 MMOL/L (ref 3.5–5.1)
PROT SERPL-MCNC: 7.1 G/DL (ref 6–8.4)
RBC # BLD AUTO: 4.57 M/UL (ref 4.6–6.2)
SATURATED IRON: 56 % (ref 20–50)
SODIUM SERPL-SCNC: 137 MMOL/L (ref 136–145)
TOTAL IRON BINDING CAPACITY: 266 UG/DL (ref 250–450)
TRANSFERRIN SERPL-MCNC: 190 MG/DL (ref 200–375)
TRIGL SERPL-MCNC: 170 MG/DL (ref 30–150)
WBC # BLD AUTO: 5.81 K/UL (ref 3.9–12.7)

## 2022-07-05 PROCEDURE — 84466 ASSAY OF TRANSFERRIN: CPT | Performed by: INTERNAL MEDICINE

## 2022-07-05 PROCEDURE — 82728 ASSAY OF FERRITIN: CPT | Performed by: INTERNAL MEDICINE

## 2022-07-05 PROCEDURE — 80061 LIPID PANEL: CPT | Performed by: INTERNAL MEDICINE

## 2022-07-05 PROCEDURE — 36415 COLL VENOUS BLD VENIPUNCTURE: CPT | Performed by: INTERNAL MEDICINE

## 2022-07-05 PROCEDURE — 85025 COMPLETE CBC W/AUTO DIFF WBC: CPT | Performed by: INTERNAL MEDICINE

## 2022-07-05 PROCEDURE — 84153 ASSAY OF PSA TOTAL: CPT | Performed by: INTERNAL MEDICINE

## 2022-07-05 PROCEDURE — 80048 BASIC METABOLIC PNL TOTAL CA: CPT | Performed by: INTERNAL MEDICINE

## 2022-07-05 PROCEDURE — 80076 HEPATIC FUNCTION PANEL: CPT | Performed by: INTERNAL MEDICINE

## 2022-07-05 PROCEDURE — 83036 HEMOGLOBIN GLYCOSYLATED A1C: CPT | Performed by: INTERNAL MEDICINE

## 2022-08-23 ENCOUNTER — IMMUNIZATION (OUTPATIENT)
Dept: PHARMACY | Facility: CLINIC | Age: 77
End: 2022-08-23
Payer: MEDICARE

## 2022-09-20 ENCOUNTER — IMMUNIZATION (OUTPATIENT)
Dept: PHARMACY | Facility: CLINIC | Age: 77
End: 2022-09-20
Payer: MEDICARE

## 2022-09-20 DIAGNOSIS — Z23 NEED FOR VACCINATION: Primary | ICD-10-CM

## 2022-09-26 RX ORDER — EVOLOCUMAB 140 MG/ML
INJECTION, SOLUTION SUBCUTANEOUS
Qty: 6 ML | Refills: 3 | Status: CANCELLED | OUTPATIENT
Start: 2022-09-26

## 2022-10-24 ENCOUNTER — IMMUNIZATION (OUTPATIENT)
Dept: PRIMARY CARE CLINIC | Facility: CLINIC | Age: 77
End: 2022-10-24
Payer: MEDICARE

## 2022-10-24 DIAGNOSIS — Z23 NEED FOR VACCINATION: Primary | ICD-10-CM

## 2022-10-24 PROCEDURE — 0124A COVID-19, MRNA, LNP-S, BIVALENT BOOSTER, PF, 30 MCG/0.3 ML DOSE: ICD-10-PCS | Mod: S$GLB,,, | Performed by: FAMILY MEDICINE

## 2022-10-24 PROCEDURE — 91312 COVID-19, MRNA, LNP-S, BIVALENT BOOSTER, PF, 30 MCG/0.3 ML DOSE: ICD-10-PCS | Mod: S$GLB,,, | Performed by: FAMILY MEDICINE

## 2022-10-24 PROCEDURE — 0124A COVID-19, MRNA, LNP-S, BIVALENT BOOSTER, PF, 30 MCG/0.3 ML DOSE: CPT | Mod: S$GLB,,, | Performed by: FAMILY MEDICINE

## 2022-10-24 PROCEDURE — 91312 COVID-19, MRNA, LNP-S, BIVALENT BOOSTER, PF, 30 MCG/0.3 ML DOSE: CPT | Mod: S$GLB,,, | Performed by: FAMILY MEDICINE

## 2022-10-27 ENCOUNTER — OFFICE VISIT (OUTPATIENT)
Dept: CARDIOLOGY | Facility: CLINIC | Age: 77
End: 2022-10-27
Payer: MEDICARE

## 2022-10-27 VITALS
WEIGHT: 193 LBS | SYSTOLIC BLOOD PRESSURE: 122 MMHG | HEIGHT: 70 IN | HEART RATE: 60 BPM | OXYGEN SATURATION: 99 % | BODY MASS INDEX: 27.63 KG/M2 | RESPIRATION RATE: 16 BRPM | DIASTOLIC BLOOD PRESSURE: 80 MMHG

## 2022-10-27 DIAGNOSIS — Z78.9 STATIN INTOLERANCE: ICD-10-CM

## 2022-10-27 DIAGNOSIS — I48.0 PAROXYSMAL ATRIAL FIBRILLATION: ICD-10-CM

## 2022-10-27 DIAGNOSIS — I25.10 CORONARY ARTERY DISEASE INVOLVING NATIVE CORONARY ARTERY OF NATIVE HEART WITHOUT ANGINA PECTORIS: ICD-10-CM

## 2022-10-27 DIAGNOSIS — E78.5 HYPERLIPIDEMIA WITH TARGET LOW DENSITY LIPOPROTEIN (LDL) CHOLESTEROL LESS THAN 70 MG/DL: ICD-10-CM

## 2022-10-27 DIAGNOSIS — I10 BENIGN HYPERTENSION: ICD-10-CM

## 2022-10-27 PROCEDURE — 99213 PR OFFICE/OUTPT VISIT, EST, LEVL III, 20-29 MIN: ICD-10-PCS | Mod: S$GLB,,, | Performed by: INTERNAL MEDICINE

## 2022-10-27 PROCEDURE — 99213 OFFICE O/P EST LOW 20 MIN: CPT | Mod: S$GLB,,, | Performed by: INTERNAL MEDICINE

## 2022-10-27 RX ORDER — TERAZOSIN 5 MG/1
5 CAPSULE ORAL NIGHTLY
Qty: 90 CAPSULE | Refills: 3 | Status: SHIPPED | OUTPATIENT
Start: 2022-10-27 | End: 2023-06-08 | Stop reason: SDUPTHER

## 2022-10-27 RX ORDER — EVOLOCUMAB 140 MG/ML
140 INJECTION, SOLUTION SUBCUTANEOUS
Qty: 6 ML | Refills: 3 | Status: SHIPPED | OUTPATIENT
Start: 2022-10-27 | End: 2023-06-08 | Stop reason: SDUPTHER

## 2022-10-27 RX ORDER — SOTALOL HYDROCHLORIDE 80 MG/1
80 TABLET ORAL 2 TIMES DAILY
Qty: 180 TABLET | Refills: 3 | Status: SHIPPED | OUTPATIENT
Start: 2022-10-27 | End: 2023-06-08 | Stop reason: SDUPTHER

## 2022-10-27 RX ORDER — HYDROCHLOROTHIAZIDE 12.5 MG/1
12.5 TABLET ORAL EVERY MORNING
Qty: 90 TABLET | Refills: 3 | Status: SHIPPED | OUTPATIENT
Start: 2022-10-27 | End: 2023-06-08 | Stop reason: SDUPTHER

## 2022-10-27 NOTE — ASSESSMENT & PLAN NOTE
Continue on are Xarelto at 20 mg daily he is on sotalol 80 mg p.o. b.i.d. and also magnesium oxide 400 mg a day.  I have encouraged to continue the same regimen.

## 2022-10-27 NOTE — ASSESSMENT & PLAN NOTE
He has severe statin intolerance to all the statin drugs.  He is tolerating Repatha with therapeutic arm control of his lipid levels.

## 2022-10-27 NOTE — ASSESSMENT & PLAN NOTE
He is on Repatha and flaxseed oil capsules med encouraged to maintain the same along with diet and exercise

## 2022-10-27 NOTE — ASSESSMENT & PLAN NOTE
Continue on risk factor modification he is on Repatha LDL cholesterol is below 70 and encouraged to maintain on low-fat low-cholesterol diet continue enteric-coated aspirin 81 daily.  And diet and exercise as is doing  is walking 6 days a week and imaged maintaining a vegetarian diet.

## 2022-10-27 NOTE — PROGRESS NOTES
Subjective:    Patient ID:  Taco Odonnell Jr is a 76 y.o. male patient here for evaluation Coronary Artery Disease, Hyperlipidemia, and Atrial Fibrillation      History of Present Illness:   Mr. Taco Odonnell pressure is here for follow-up evaluation seem to be doing very well no occurrence of any angina shortness of breath PND orthopnea noted.  Patient denies having any new cardiac symptoms and no shortness of breath physical activity.  He exercises up to 4 miles on average day walking and a is regimented with his diet as well but he finds it difficult to lose any weight or maintain good abdominal tone.    No cough or congestion no fevers chills no nausea vomiting.    He has completed the full course of COVID vaccination and the last dose was on Monday of last week.  He had mild symptoms and no significant myalgias noted          Review of patient's allergies indicates:   Allergen Reactions    Penicillin g Other (See Comments)     Showed on allergy test        Past Medical History:   Diagnosis Date    Benign hypertension     Coronary artery disease     Glaucoma     Hyperlipidemia LDL goal < 70      Past Surgical History:   Procedure Laterality Date    CORONARY ARTERY BYPASS GRAFT      ROTATOR CUFF REPAIR       Social History     Tobacco Use    Smoking status: Former     Packs/day: 1.00     Years: 40.00     Pack years: 40.00     Types: Cigarettes     Quit date: 2002     Years since quittin.8    Smokeless tobacco: Never   Substance Use Topics    Alcohol use: No    Drug use: No        Review of Systems:    As noted in HPI in addition      REVIEW OF SYSTEMS  CARDIOVASCULAR: No recent chest pain, palpitations, arm, neck, or jaw pain  RESPIRATORY: No recent fever, cough chills, SOB or congestion  : No blood in the urine  GI: No Nausea, vomiting, constipation, diarrhea, blood, or reflux.  MUSCULOSKELETAL: No myalgias  NEURO: No lightheadedness or dizziness  EYES: No Double vision, blurry, vision or  headache              Objective        Vitals:    10/27/22 1504   BP: 122/80   Pulse: 60   Resp: 16       LIPIDS - LAST 2   Lab Results   Component Value Date    CHOL 136 07/05/2022    CHOL 139 01/03/2022    HDL 44 07/05/2022    HDL 47 01/03/2022    LDLCALC 58.0 (L) 07/05/2022    LDLCALC 58.8 (L) 01/03/2022    TRIG 170 (H) 07/05/2022    TRIG 166 (H) 01/03/2022    CHOLHDL 32.4 07/05/2022    CHOLHDL 33.8 01/03/2022       CBC - LAST 2  Lab Results   Component Value Date    WBC 5.81 07/05/2022    RBC 4.57 (L) 07/05/2022    HGB 14.9 07/05/2022    HCT 42.9 07/05/2022    MCV 94 07/05/2022    MCH 32.6 (H) 07/05/2022    MCHC 34.7 07/05/2022    RDW 11.9 07/05/2022     07/05/2022    MPV 10.5 07/05/2022    GRAN 3.7 07/05/2022    GRAN 63.6 07/05/2022    LYMPH 1.5 07/05/2022    LYMPH 26.5 07/05/2022    MONO 0.4 07/05/2022    MONO 7.2 07/05/2022    BASO 0.05 07/05/2022    NRBC 0 07/05/2022       CHEMISTRY & LIVER FUNCTION - LAST 2  Lab Results   Component Value Date     07/05/2022     01/03/2022    K 3.7 07/05/2022    K 4.3 01/03/2022     07/05/2022     01/03/2022    CO2 25 07/05/2022    CO2 28 01/03/2022    ANIONGAP 10 07/05/2022    ANIONGAP 10 01/03/2022    BUN 12 07/05/2022    BUN 13 01/03/2022    CREATININE 0.6 07/05/2022    CREATININE 0.8 01/03/2022     07/05/2022     01/03/2022    CALCIUM 8.9 07/05/2022    CALCIUM 9.4 01/03/2022    MG 2.0 02/24/2020    ALBUMIN 4.3 07/05/2022    ALBUMIN 4.2 01/03/2022    PROT 7.1 07/05/2022    PROT 7.1 01/03/2022    ALKPHOS 45 (L) 07/05/2022    ALKPHOS 46 (L) 01/03/2022    ALT 23 07/05/2022    ALT 21 01/03/2022    AST 21 07/05/2022    AST 19 01/03/2022    BILITOT 1.6 (H) 07/05/2022    BILITOT 1.4 (H) 01/03/2022        CARDIAC PROFILE - LAST 2  Lab Results   Component Value Date     (H) 04/26/2021        COAGULATION - LAST 2  No results found for: LABPT, INR, APTT    ENDOCRINE & PSA - LAST 2  Lab Results   Component Value Date    HGBA1C 5.3  07/05/2022    HGBA1C 5.4 01/03/2022    PSA 1.3 07/05/2022    PSA 0.92 02/19/2021        ECHOCARDIOGRAM RESULTS  No results found for this or any previous visit.      CURRENT/PREVIOUS VISIT EKG  No results found for this or any previous visit.  No valid procedures specified.   No results found for this or any previous visit.    No valid procedures specified.    PHYSICAL EXAM  CONSTITUTIONAL: Well built, well nourished in no apparent distress  NECK: no carotid bruit, no JVD  LUNGS: CTA  CHEST WALL: no tenderness  HEART: regular rate and rhythm, S1, S2 normal, no murmur, click, rub or gallop   ABDOMEN: soft, non-tender; bowel sounds normal; no masses,  no organomegaly  EXTREMITIES: Extremities normal, no edema, no calf tenderness noted  NEURO: AAO X 3    I HAVE REVIEWED :    The vital signs, nurses notes, and all the pertinent radiology and labs.        Current Outpatient Medications   Medication Instructions    aspirin (ECOTRIN) 81 mg, Oral, Daily,      coenzyme Q10 200 mg, Oral, Daily,      FLAXSEED OIL ORAL 1,200 mg, Oral, 2 times daily,      hydroCHLOROthiazide (HYDRODIURIL) 12.5 mg, Oral, Every morning    multivitamin capsule 1 capsule, Oral, Daily,      REPATHA SURECLICK 140 mg/mL PnIj INJECT 1 ML SUBCUTANEOUSLY  EVERY TWO WEEKS    sotaloL (BETAPACE) 80 MG tablet Take 1 tablet by mouth twice daily    terazosin (HYTRIN) 5 MG capsule Take 1 capsule by mouth at bedtime    XARELTO 20 mg Tab Take 1 tablet by mouth once daily          Assessment & Plan     Coronary artery disease  Continue on risk factor modification he is on Repatha LDL cholesterol is below 70 and encouraged to maintain on low-fat low-cholesterol diet continue enteric-coated aspirin 81 daily.  And diet and exercise as is doing  is walking 6 days a week and imaged maintaining a vegetarian diet.    Benign hypertension  Blood pressure is stable at 120 2/80 mm Hg continue on Hytrin 5 mg at bedtime, continue on hydrochlorothiazide 12.5 mg daily.  Maintain  on low-salt diet    Hyperlipidemia LDL goal < 70  He is on Repatha and flaxseed oil capsules med encouraged to maintain the same along with diet and exercise    Paroxysmal atrial fibrillation  Continue on are Xarelto at 20 mg daily he is on sotalol 80 mg p.o. b.i.d. and also magnesium oxide 400 mg a day.  I have encouraged to continue the same regimen.    Statin intolerance  He has severe statin intolerance to all the statin drugs.  He is tolerating Repatha with therapeutic arm control of his lipid levels.          No follow-ups on file.

## 2022-12-07 ENCOUNTER — LAB VISIT (OUTPATIENT)
Dept: LAB | Facility: HOSPITAL | Age: 77
End: 2022-12-07
Attending: INTERNAL MEDICINE
Payer: MEDICARE

## 2022-12-07 DIAGNOSIS — E03.4 IDIOPATHIC ATROPHIC HYPOTHYROIDISM: ICD-10-CM

## 2022-12-07 DIAGNOSIS — I10 ESSENTIAL HYPERTENSION, MALIGNANT: Primary | ICD-10-CM

## 2022-12-07 DIAGNOSIS — Z79.899 ENCOUNTER FOR LONG-TERM (CURRENT) USE OF OTHER MEDICATIONS: ICD-10-CM

## 2022-12-07 DIAGNOSIS — E11.9 DIABETES MELLITUS WITHOUT COMPLICATION: ICD-10-CM

## 2022-12-07 DIAGNOSIS — D50.0 IRON DEFICIENCY ANEMIA SECONDARY TO BLOOD LOSS (CHRONIC): ICD-10-CM

## 2022-12-07 DIAGNOSIS — E78.2 MIXED HYPERLIPIDEMIA: ICD-10-CM

## 2022-12-07 LAB
ALBUMIN SERPL BCP-MCNC: 4.1 G/DL (ref 3.5–5.2)
ALBUMIN/CREAT UR: 5.8 UG/MG (ref 0–30)
ALP SERPL-CCNC: 48 U/L (ref 55–135)
ALT SERPL W/O P-5'-P-CCNC: 23 U/L (ref 10–44)
ANION GAP SERPL CALC-SCNC: 7 MMOL/L (ref 8–16)
AST SERPL-CCNC: 26 U/L (ref 10–40)
BASOPHILS # BLD AUTO: 0.04 K/UL (ref 0–0.2)
BASOPHILS NFR BLD: 0.3 % (ref 0–1.9)
BILIRUB DIRECT SERPL-MCNC: 0.3 MG/DL (ref 0.1–0.3)
BILIRUB SERPL-MCNC: 1.6 MG/DL (ref 0.1–1)
BUN SERPL-MCNC: 11 MG/DL (ref 8–23)
CALCIUM SERPL-MCNC: 9 MG/DL (ref 8.7–10.5)
CHLORIDE SERPL-SCNC: 103 MMOL/L (ref 95–110)
CHOLEST SERPL-MCNC: 113 MG/DL (ref 120–199)
CHOLEST/HDLC SERPL: 1.8 {RATIO} (ref 2–5)
CO2 SERPL-SCNC: 29 MMOL/L (ref 23–29)
CREAT SERPL-MCNC: 0.7 MG/DL (ref 0.5–1.4)
CREAT UR-MCNC: 64 MG/DL (ref 23–375)
DIFFERENTIAL METHOD: ABNORMAL
EOSINOPHIL # BLD AUTO: 0.1 K/UL (ref 0–0.5)
EOSINOPHIL NFR BLD: 1.1 % (ref 0–8)
ERYTHROCYTE [DISTWIDTH] IN BLOOD BY AUTOMATED COUNT: 12.3 % (ref 11.5–14.5)
EST. GFR  (NO RACE VARIABLE): >60 ML/MIN/1.73 M^2
ESTIMATED AVG GLUCOSE: 108 MG/DL (ref 68–131)
FERRITIN SERPL-MCNC: 343 NG/ML (ref 20–300)
GLUCOSE SERPL-MCNC: 116 MG/DL (ref 70–110)
HBA1C MFR BLD: 5.4 % (ref 4.5–6.2)
HCT VFR BLD AUTO: 40 % (ref 40–54)
HDLC SERPL-MCNC: 62 MG/DL (ref 40–75)
HDLC SERPL: 54.9 % (ref 20–50)
HGB BLD-MCNC: 13.6 G/DL (ref 14–18)
IMM GRANULOCYTES # BLD AUTO: 0.05 K/UL (ref 0–0.04)
IMM GRANULOCYTES NFR BLD AUTO: 0.4 % (ref 0–0.5)
IRON SERPL-MCNC: 28 UG/DL (ref 45–160)
LDLC SERPL CALC-MCNC: 38.8 MG/DL (ref 63–159)
LYMPHOCYTES # BLD AUTO: 1.2 K/UL (ref 1–4.8)
LYMPHOCYTES NFR BLD: 9.6 % (ref 18–48)
MCH RBC QN AUTO: 33 PG (ref 27–31)
MCHC RBC AUTO-ENTMCNC: 34 G/DL (ref 32–36)
MCV RBC AUTO: 97 FL (ref 82–98)
MICROALBUMIN UR DL<=1MG/L-MCNC: 3.7 UG/ML
MONOCYTES # BLD AUTO: 1.1 K/UL (ref 0.3–1)
MONOCYTES NFR BLD: 8.9 % (ref 4–15)
NEUTROPHILS # BLD AUTO: 10 K/UL (ref 1.8–7.7)
NEUTROPHILS NFR BLD: 79.7 % (ref 38–73)
NONHDLC SERPL-MCNC: 51 MG/DL
NRBC BLD-RTO: 0 /100 WBC
PLATELET # BLD AUTO: 181 K/UL (ref 150–450)
PMV BLD AUTO: 10.9 FL (ref 9.2–12.9)
POTASSIUM SERPL-SCNC: 4.4 MMOL/L (ref 3.5–5.1)
PROT SERPL-MCNC: 7.4 G/DL (ref 6–8.4)
RBC # BLD AUTO: 4.12 M/UL (ref 4.6–6.2)
SATURATED IRON: 12 % (ref 20–50)
SODIUM SERPL-SCNC: 139 MMOL/L (ref 136–145)
T4 FREE SERPL-MCNC: 0.84 NG/DL (ref 0.71–1.51)
TOTAL IRON BINDING CAPACITY: 232 UG/DL (ref 250–450)
TRANSFERRIN SERPL-MCNC: 166 MG/DL (ref 200–375)
TRIGL SERPL-MCNC: 61 MG/DL (ref 30–150)
TSH SERPL DL<=0.005 MIU/L-ACNC: 1 UIU/ML (ref 0.34–5.6)
WBC # BLD AUTO: 12.58 K/UL (ref 3.9–12.7)

## 2022-12-07 PROCEDURE — 83036 HEMOGLOBIN GLYCOSYLATED A1C: CPT | Performed by: INTERNAL MEDICINE

## 2022-12-07 PROCEDURE — 84466 ASSAY OF TRANSFERRIN: CPT | Performed by: INTERNAL MEDICINE

## 2022-12-07 PROCEDURE — 84443 ASSAY THYROID STIM HORMONE: CPT | Performed by: INTERNAL MEDICINE

## 2022-12-07 PROCEDURE — 82570 ASSAY OF URINE CREATININE: CPT | Performed by: INTERNAL MEDICINE

## 2022-12-07 PROCEDURE — 80076 HEPATIC FUNCTION PANEL: CPT | Performed by: INTERNAL MEDICINE

## 2022-12-07 PROCEDURE — 80061 LIPID PANEL: CPT | Performed by: INTERNAL MEDICINE

## 2022-12-07 PROCEDURE — 82043 UR ALBUMIN QUANTITATIVE: CPT | Performed by: INTERNAL MEDICINE

## 2022-12-07 PROCEDURE — 85025 COMPLETE CBC W/AUTO DIFF WBC: CPT | Performed by: INTERNAL MEDICINE

## 2022-12-07 PROCEDURE — 36415 COLL VENOUS BLD VENIPUNCTURE: CPT | Performed by: INTERNAL MEDICINE

## 2022-12-07 PROCEDURE — 80048 BASIC METABOLIC PNL TOTAL CA: CPT | Performed by: INTERNAL MEDICINE

## 2022-12-07 PROCEDURE — 82728 ASSAY OF FERRITIN: CPT | Performed by: INTERNAL MEDICINE

## 2022-12-07 PROCEDURE — 84439 ASSAY OF FREE THYROXINE: CPT | Performed by: INTERNAL MEDICINE

## 2023-06-07 ENCOUNTER — LAB VISIT (OUTPATIENT)
Dept: LAB | Facility: HOSPITAL | Age: 78
End: 2023-06-07
Attending: INTERNAL MEDICINE
Payer: MEDICARE

## 2023-06-07 DIAGNOSIS — E11.9 DIABETES MELLITUS WITHOUT COMPLICATION: ICD-10-CM

## 2023-06-07 DIAGNOSIS — D50.0 IRON DEFICIENCY ANEMIA SECONDARY TO BLOOD LOSS (CHRONIC): ICD-10-CM

## 2023-06-07 DIAGNOSIS — Z79.899 ENCOUNTER FOR LONG-TERM (CURRENT) USE OF OTHER MEDICATIONS: ICD-10-CM

## 2023-06-07 DIAGNOSIS — I10 ESSENTIAL HYPERTENSION, MALIGNANT: Primary | ICD-10-CM

## 2023-06-07 DIAGNOSIS — E78.2 MIXED HYPERLIPIDEMIA: ICD-10-CM

## 2023-06-07 LAB
ALBUMIN SERPL BCP-MCNC: 4.2 G/DL (ref 3.5–5.2)
ALBUMIN/CREAT UR: NORMAL UG/MG (ref 0–30)
ALP SERPL-CCNC: 49 U/L (ref 55–135)
ALT SERPL W/O P-5'-P-CCNC: 25 U/L (ref 10–44)
ANION GAP SERPL CALC-SCNC: 6 MMOL/L (ref 8–16)
AST SERPL-CCNC: 22 U/L (ref 10–40)
BASOPHILS # BLD AUTO: 0.05 K/UL (ref 0–0.2)
BASOPHILS NFR BLD: 0.9 % (ref 0–1.9)
BILIRUB DIRECT SERPL-MCNC: 0.2 MG/DL (ref 0.1–0.3)
BILIRUB SERPL-MCNC: 1.4 MG/DL (ref 0.1–1)
BUN SERPL-MCNC: 11 MG/DL (ref 8–23)
CALCIUM SERPL-MCNC: 9.1 MG/DL (ref 8.7–10.5)
CHLORIDE SERPL-SCNC: 108 MMOL/L (ref 95–110)
CHOLEST SERPL-MCNC: 117 MG/DL (ref 120–199)
CHOLEST/HDLC SERPL: 2.3 {RATIO} (ref 2–5)
CO2 SERPL-SCNC: 26 MMOL/L (ref 23–29)
CREAT SERPL-MCNC: 0.7 MG/DL (ref 0.5–1.4)
CREAT UR-MCNC: 74 MG/DL (ref 23–375)
DIFFERENTIAL METHOD: ABNORMAL
EOSINOPHIL # BLD AUTO: 0.1 K/UL (ref 0–0.5)
EOSINOPHIL NFR BLD: 2 % (ref 0–8)
ERYTHROCYTE [DISTWIDTH] IN BLOOD BY AUTOMATED COUNT: 12.3 % (ref 11.5–14.5)
EST. GFR  (NO RACE VARIABLE): >60 ML/MIN/1.73 M^2
ESTIMATED AVG GLUCOSE: 117 MG/DL (ref 68–131)
FERRITIN SERPL-MCNC: 263 NG/ML (ref 20–300)
GLUCOSE SERPL-MCNC: 105 MG/DL (ref 70–110)
HBA1C MFR BLD: 5.7 % (ref 4.5–6.2)
HCT VFR BLD AUTO: 42.3 % (ref 40–54)
HDLC SERPL-MCNC: 51 MG/DL (ref 40–75)
HDLC SERPL: 43.6 % (ref 20–50)
HGB BLD-MCNC: 14.5 G/DL (ref 14–18)
IMM GRANULOCYTES # BLD AUTO: 0.01 K/UL (ref 0–0.04)
IMM GRANULOCYTES NFR BLD AUTO: 0.2 % (ref 0–0.5)
IRON SERPL-MCNC: 183 UG/DL (ref 45–160)
LDLC SERPL CALC-MCNC: 37 MG/DL (ref 63–159)
LYMPHOCYTES # BLD AUTO: 1.6 K/UL (ref 1–4.8)
LYMPHOCYTES NFR BLD: 27.1 % (ref 18–48)
MCH RBC QN AUTO: 33.1 PG (ref 27–31)
MCHC RBC AUTO-ENTMCNC: 34.3 G/DL (ref 32–36)
MCV RBC AUTO: 97 FL (ref 82–98)
MICROALBUMIN UR DL<=1MG/L-MCNC: <2 UG/ML
MONOCYTES # BLD AUTO: 0.5 K/UL (ref 0.3–1)
MONOCYTES NFR BLD: 8.5 % (ref 4–15)
NEUTROPHILS # BLD AUTO: 3.6 K/UL (ref 1.8–7.7)
NEUTROPHILS NFR BLD: 61.3 % (ref 38–73)
NONHDLC SERPL-MCNC: 66 MG/DL
NRBC BLD-RTO: 0 /100 WBC
PLATELET # BLD AUTO: 182 K/UL (ref 150–450)
PMV BLD AUTO: 10.6 FL (ref 9.2–12.9)
POTASSIUM SERPL-SCNC: 4.2 MMOL/L (ref 3.5–5.1)
PROT SERPL-MCNC: 6.8 G/DL (ref 6–8.4)
RBC # BLD AUTO: 4.38 M/UL (ref 4.6–6.2)
SATURATED IRON: 73 % (ref 20–50)
SODIUM SERPL-SCNC: 140 MMOL/L (ref 136–145)
TOTAL IRON BINDING CAPACITY: 252 UG/DL (ref 250–450)
TRANSFERRIN SERPL-MCNC: 180 MG/DL (ref 200–375)
TRIGL SERPL-MCNC: 145 MG/DL (ref 30–150)
WBC # BLD AUTO: 5.87 K/UL (ref 3.9–12.7)

## 2023-06-07 PROCEDURE — 85025 COMPLETE CBC W/AUTO DIFF WBC: CPT | Performed by: INTERNAL MEDICINE

## 2023-06-07 PROCEDURE — 82570 ASSAY OF URINE CREATININE: CPT | Performed by: INTERNAL MEDICINE

## 2023-06-07 PROCEDURE — 80061 LIPID PANEL: CPT | Performed by: INTERNAL MEDICINE

## 2023-06-07 PROCEDURE — 84466 ASSAY OF TRANSFERRIN: CPT | Performed by: INTERNAL MEDICINE

## 2023-06-07 PROCEDURE — 80048 BASIC METABOLIC PNL TOTAL CA: CPT | Performed by: INTERNAL MEDICINE

## 2023-06-07 PROCEDURE — 80076 HEPATIC FUNCTION PANEL: CPT | Performed by: INTERNAL MEDICINE

## 2023-06-07 PROCEDURE — 82728 ASSAY OF FERRITIN: CPT | Performed by: INTERNAL MEDICINE

## 2023-06-07 PROCEDURE — 83036 HEMOGLOBIN GLYCOSYLATED A1C: CPT | Performed by: INTERNAL MEDICINE

## 2023-06-07 PROCEDURE — 36415 COLL VENOUS BLD VENIPUNCTURE: CPT | Performed by: INTERNAL MEDICINE

## 2023-06-08 ENCOUNTER — OFFICE VISIT (OUTPATIENT)
Dept: CARDIOLOGY | Facility: CLINIC | Age: 78
End: 2023-06-08
Payer: MEDICARE

## 2023-06-08 VITALS
HEART RATE: 54 BPM | DIASTOLIC BLOOD PRESSURE: 72 MMHG | SYSTOLIC BLOOD PRESSURE: 124 MMHG | HEIGHT: 70 IN | BODY MASS INDEX: 27.77 KG/M2 | RESPIRATION RATE: 16 BRPM | OXYGEN SATURATION: 96 % | WEIGHT: 194 LBS

## 2023-06-08 DIAGNOSIS — Z78.9 STATIN INTOLERANCE: ICD-10-CM

## 2023-06-08 DIAGNOSIS — I48.0 PAROXYSMAL ATRIAL FIBRILLATION: ICD-10-CM

## 2023-06-08 DIAGNOSIS — E78.5 HYPERLIPIDEMIA WITH TARGET LOW DENSITY LIPOPROTEIN (LDL) CHOLESTEROL LESS THAN 70 MG/DL: ICD-10-CM

## 2023-06-08 DIAGNOSIS — I25.10 CORONARY ARTERY DISEASE INVOLVING NATIVE CORONARY ARTERY OF NATIVE HEART WITHOUT ANGINA PECTORIS: ICD-10-CM

## 2023-06-08 DIAGNOSIS — I10 BENIGN HYPERTENSION: ICD-10-CM

## 2023-06-08 PROCEDURE — 99999 PR PBB SHADOW E&M-EST. PATIENT-LVL III: ICD-10-PCS | Mod: PBBFAC,,, | Performed by: INTERNAL MEDICINE

## 2023-06-08 PROCEDURE — 99213 PR OFFICE/OUTPT VISIT, EST, LEVL III, 20-29 MIN: ICD-10-PCS | Mod: S$PBB,,, | Performed by: INTERNAL MEDICINE

## 2023-06-08 PROCEDURE — 99213 OFFICE O/P EST LOW 20 MIN: CPT | Mod: PBBFAC,PN | Performed by: INTERNAL MEDICINE

## 2023-06-08 PROCEDURE — 99213 OFFICE O/P EST LOW 20 MIN: CPT | Mod: S$PBB,,, | Performed by: INTERNAL MEDICINE

## 2023-06-08 PROCEDURE — 99999 PR PBB SHADOW E&M-EST. PATIENT-LVL III: CPT | Mod: PBBFAC,,, | Performed by: INTERNAL MEDICINE

## 2023-06-08 RX ORDER — TERAZOSIN 5 MG/1
5 CAPSULE ORAL NIGHTLY
Qty: 90 CAPSULE | Refills: 3 | Status: SHIPPED | OUTPATIENT
Start: 2023-06-08 | End: 2024-06-07

## 2023-06-08 RX ORDER — HYDROCHLOROTHIAZIDE 12.5 MG/1
12.5 TABLET ORAL EVERY MORNING
Qty: 90 TABLET | Refills: 3 | Status: SHIPPED | OUTPATIENT
Start: 2023-06-08 | End: 2024-06-07

## 2023-06-08 RX ORDER — SOTALOL HYDROCHLORIDE 80 MG/1
80 TABLET ORAL 2 TIMES DAILY
Qty: 180 TABLET | Refills: 3 | Status: SHIPPED | OUTPATIENT
Start: 2023-06-08 | End: 2024-06-07

## 2023-06-08 RX ORDER — EVOLOCUMAB 140 MG/ML
140 INJECTION, SOLUTION SUBCUTANEOUS
Qty: 6 ML | Refills: 3 | Status: SHIPPED | OUTPATIENT
Start: 2023-06-08 | End: 2023-11-29 | Stop reason: HOSPADM

## 2023-06-08 NOTE — ASSESSMENT & PLAN NOTE
Stable from angina perspective continue on risk factor modification and continue diet and exercise as his doing.  Stay on baby aspirin   requires elixir form

## 2023-06-08 NOTE — ASSESSMENT & PLAN NOTE
Continue on sotalol 80 mg p.o. b.i.d. and also on Xarelto 20 mg once a day.  His therapeutic and stable at this time no further episodes of palpitations noted continue on magnesium oxide 400 mg a day

## 2023-06-08 NOTE — PROGRESS NOTES
Subjective:    Patient ID:  Taco Odonnell Jr is a 77 y.o. male patient here for evaluation Follow-up and Results (Labs in epic)      History of Present Illness:  Mr. 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  Cut down to 5 days and 4 miles and is doing much better during these effort capacity and no cardiac symptoms are noted no fatigue and tiredness.  He is careful with his dietary intake he he is taking iron supplements because of low iron in the past.  However recent labs obtained yesterday that shows mildly elevated levels advised him to cut back on this.      Review of patient's allergies indicates:   Allergen Reactions    Penicillin g Other (See Comments)     Showed on allergy test        Past Medical History:   Diagnosis Date    Benign hypertension     Coronary artery disease     Glaucoma     Hyperlipidemia LDL goal < 70      Past Surgical History:   Procedure Laterality Date    CORONARY ARTERY BYPASS GRAFT      ROTATOR CUFF REPAIR       Social History     Tobacco Use    Smoking status: Former     Packs/day: 1.00     Years: 40.00     Pack years: 40.00     Types: Cigarettes     Quit date: 2002     Years since quittin.4    Smokeless tobacco: Never   Substance Use Topics    Alcohol use: No    Drug use: No        Review of Systems:    As noted in HPI in addition      REVIEW OF SYSTEMS  CARDIOVASCULAR: No recent chest pain, palpitations, arm, neck, or jaw pain  RESPIRATORY: No recent fever, cough chills, SOB or congestion  : No blood in the urine  GI: No Nausea, vomiting, constipation, diarrhea, blood, or reflux.  MUSCULOSKELETAL: No myalgias  NEURO: No lightheadedness or dizziness  EYES: No Double vision, blurry, vision or headache               Objective        Vitals:    06/08/23 1018   BP: 124/72   Pulse: (!) 54   Resp: 16       LIPIDS - LAST 2   Lab Results   Component Value Date    CHOL 117 (L) 06/07/2023    CHOL 113 (L) 12/07/2022    HDL 51 06/07/2023    HDL 62 12/07/2022    LDLCALC 37.0 (L) 06/07/2023    LDLCALC 38.8 (L) 12/07/2022    TRIG 145 06/07/2023    TRIG 61 12/07/2022    CHOLHDL 43.6 06/07/2023    CHOLHDL 54.9 (H) 12/07/2022       CBC - LAST 2  Lab Results   Component Value Date    WBC 5.87 06/07/2023    WBC 12.58 12/07/2022    RBC 4.38 (L) 06/07/2023    RBC 4.12 (L) 12/07/2022    HGB 14.5 06/07/2023    HGB 13.6 (L) 12/07/2022    HCT 42.3 06/07/2023    HCT 40.0 12/07/2022    MCV 97 06/07/2023    MCV 97 12/07/2022    MCH 33.1 (H) 06/07/2023    MCH 33.0 (H) 12/07/2022    MCHC 34.3 06/07/2023    MCHC 34.0 12/07/2022    RDW 12.3 06/07/2023    RDW 12.3 12/07/2022     06/07/2023     12/07/2022    MPV 10.6 06/07/2023    MPV 10.9 12/07/2022    GRAN 3.6 06/07/2023    GRAN 61.3 06/07/2023    LYMPH 1.6 06/07/2023    LYMPH 27.1 06/07/2023    MONO 0.5 06/07/2023    MONO 8.5 06/07/2023    BASO 0.05 06/07/2023    BASO 0.04 12/07/2022    NRBC 0 06/07/2023    NRBC 0 12/07/2022       CHEMISTRY & LIVER FUNCTION - LAST 2  Lab Results   Component Value Date     06/07/2023     12/07/2022    K 4.2 06/07/2023    K 4.4 12/07/2022     06/07/2023     12/07/2022    CO2 26 06/07/2023    CO2 29 12/07/2022    ANIONGAP 6 (L) 06/07/2023    ANIONGAP 7 (L) 12/07/2022    BUN 11 06/07/2023    BUN 11 12/07/2022    CREATININE 0.7 06/07/2023    CREATININE 0.7 12/07/2022     06/07/2023     (H) 12/07/2022    CALCIUM 9.1 06/07/2023    CALCIUM 9.0 12/07/2022    MG 2.0 02/24/2020    ALBUMIN 4.2 06/07/2023    ALBUMIN 4.1 12/07/2022    PROT 6.8 06/07/2023    PROT 7.4 12/07/2022    ALKPHOS 49 (L) 06/07/2023    ALKPHOS 48 (L) 12/07/2022    ALT 25 06/07/2023    ALT 23 12/07/2022    AST 22 06/07/2023    AST 26 12/07/2022    BILITOT 1.4  (H) 06/07/2023    BILITOT 1.6 (H) 12/07/2022        CARDIAC PROFILE - LAST 2  Lab Results   Component Value Date     (H) 04/26/2021        COAGULATION - LAST 2  No results found for: LABPT, INR, APTT    ENDOCRINE & PSA - LAST 2  Lab Results   Component Value Date    HGBA1C 5.7 06/07/2023    HGBA1C 5.4 12/07/2022    TSH 1.000 12/07/2022    PSA 1.3 07/05/2022    PSA 0.92 02/19/2021        ECHOCARDIOGRAM RESULTS  No results found for this or any previous visit.      CURRENT/PREVIOUS VISIT EKG  No results found for this or any previous visit.  No valid procedures specified.   No results found for this or any previous visit.    No valid procedures specified.    PHYSICAL EXAM  CONSTITUTIONAL: Well built, well nourished in no apparent distress  NECK: no carotid bruit, no JVD  LUNGS: CTA  CHEST WALL: no tenderness  HEART: regular rate and rhythm, S1, S2 normal, no murmur, click, rub or gallop   ABDOMEN: soft, non-tender; bowel sounds normal; no masses,  no organomegaly  EXTREMITIES: Extremities normal, no edema, no calf tenderness noted  At least 2+ and dorsalis pedis and posterior tibials noted bilaterally.  NEURO: AAO X 3    I HAVE REVIEWED :    The vital signs, nurses notes, and all the pertinent radiology and labs.        Current Outpatient Medications   Medication Instructions    aspirin (ECOTRIN) 81 mg, Oral, Daily,      coenzyme Q10 200 mg, Oral, Daily,      doxycycline (VIBRA-TABS) 100 MG tablet Take 1 tablet twice a day for 7 days    FLAXSEED OIL ORAL 1,200 mg, Oral, 2 times daily,      hydroCHLOROthiazide (HYDRODIURIL) 12.5 mg, Oral, Every morning    multivitamin capsule 1 capsule, Oral, Daily,      predniSONE (DELTASONE) 5 MG tablet Take 1 tablet twice a day for 5 days    promethazine-codeine 6.25-10 mg/5 ml (PHENERGAN WITH CODEINE) 6.25-10 mg/5 mL syrup Take 5 ml by mouth every 4 hours occasionally as needed for cough    REPATHA SURECLICK 140 mg, Subcutaneous, Every 14 days    rivaroxaban (XARELTO) 20 mg,  Oral, Daily    sotaloL (BETAPACE) 80 mg, Oral, 2 times daily    terazosin (HYTRIN) 5 mg, Oral, Nightly          Assessment & Plan     Paroxysmal atrial fibrillation  Continue on sotalol 80 mg p.o. b.i.d. and also on Xarelto 20 mg once a day.  His therapeutic and stable at this time no further episodes of palpitations noted continue on magnesium oxide 400 mg a day    Hyperlipidemia LDL goal < 70  Lipid levels are remarkably well controlled.  Continue the same therapy   Latest Reference Range & Units Most Recent   Cholesterol 120 - 199 mg/dL 117 (L)  6/7/23 06:39   HDL 40 - 75 mg/dL 51  6/7/23 06:39   HDL/Cholesterol Ratio 20.0 - 50.0 % 43.6  6/7/23 06:39   LDL Cholesterol External 63.0 - 159.0 mg/dL 37.0 (L)  6/7/23 06:39   Non-HDL Cholesterol mg/dL 66  6/7/23 06:39   Total Cholesterol/HDL Ratio 2.0 - 5.0  2.3  6/7/23 06:39   Triglycerides 30 - 150 mg/dL 145  6/7/23 06:39   (L): Data is abnormally low  And has statin intolerance.  Continue on Repatha 140 mg every 2 weeks.    Coronary artery disease  Stable from angina perspective continue on risk factor modification and continue diet and exercise as his doing.  Stay on baby aspirin    Benign hypertension  Continue on hydrochlorothiazide 12.5 mg once a day and Hytrin 5 mg at nightly          No follow-ups on file.

## 2023-06-08 NOTE — ASSESSMENT & PLAN NOTE
Lipid levels are remarkably well controlled.  Continue the same therapy   Latest Reference Range & Units Most Recent   Cholesterol 120 - 199 mg/dL 117 (L)  6/7/23 06:39   HDL 40 - 75 mg/dL 51  6/7/23 06:39   HDL/Cholesterol Ratio 20.0 - 50.0 % 43.6  6/7/23 06:39   LDL Cholesterol External 63.0 - 159.0 mg/dL 37.0 (L)  6/7/23 06:39   Non-HDL Cholesterol mg/dL 66  6/7/23 06:39   Total Cholesterol/HDL Ratio 2.0 - 5.0  2.3  6/7/23 06:39   Triglycerides 30 - 150 mg/dL 145  6/7/23 06:39   (L): Data is abnormally low  And has statin intolerance.  Continue on Repatha 140 mg every 2 weeks.

## 2023-09-05 ENCOUNTER — IMMUNIZATION (OUTPATIENT)
Dept: FAMILY MEDICINE | Facility: CLINIC | Age: 78
End: 2023-09-05
Payer: MEDICARE

## 2023-09-05 DIAGNOSIS — Z23 NEED FOR VACCINATION: Primary | ICD-10-CM

## 2023-09-05 PROCEDURE — 99999PBSHW COVID-19, MRNA, LNP-S, BIVALENT BOOSTER, PF, 30 MCG/0.3 ML DOSE: Mod: PBBFAC,,,

## 2023-09-05 PROCEDURE — 0124A COVID-19, MRNA, LNP-S, BIVALENT BOOSTER, PF, 30 MCG/0.3 ML DOSE: CPT | Mod: PBBFAC,PO

## 2023-09-05 PROCEDURE — 99999PBSHW COVID-19, MRNA, LNP-S, BIVALENT BOOSTER, PF, 30 MCG/0.3 ML DOSE: ICD-10-PCS | Mod: PBBFAC,,,

## 2023-09-05 PROCEDURE — 91312 COVID-19, MRNA, LNP-S, BIVALENT BOOSTER, PF, 30 MCG/0.3 ML DOSE: CPT | Mod: PBBFAC,PO

## 2023-10-09 ENCOUNTER — HOSPITAL ENCOUNTER (OUTPATIENT)
Dept: RADIOLOGY | Facility: HOSPITAL | Age: 78
Discharge: HOME | End: 2023-10-09
Attending: INTERNAL MEDICINE
Payer: MEDICARE

## 2023-10-09 DIAGNOSIS — M25.552 LEFT HIP PAIN: ICD-10-CM

## 2023-10-09 DIAGNOSIS — M25.552 LEFT HIP PAIN: Primary | ICD-10-CM

## 2023-10-09 PROCEDURE — 73501 X-RAY EXAM HIP UNI 1 VIEW: CPT | Mod: TC,LT

## 2023-10-11 ENCOUNTER — LAB VISIT (OUTPATIENT)
Dept: LAB | Facility: HOSPITAL | Age: 78
End: 2023-10-11
Attending: INTERNAL MEDICINE
Payer: MEDICARE

## 2023-10-11 DIAGNOSIS — M70.62 TROCHANTERIC BURSITIS OF LEFT HIP: ICD-10-CM

## 2023-10-11 DIAGNOSIS — R30.0 DYSURIA: ICD-10-CM

## 2023-10-11 DIAGNOSIS — D50.0 IRON DEFICIENCY ANEMIA SECONDARY TO BLOOD LOSS (CHRONIC): ICD-10-CM

## 2023-10-11 DIAGNOSIS — Z12.5 SPECIAL SCREENING FOR MALIGNANT NEOPLASM OF PROSTATE: ICD-10-CM

## 2023-10-11 DIAGNOSIS — R10.11 ABDOMINAL PAIN, RIGHT UPPER QUADRANT: Primary | ICD-10-CM

## 2023-10-11 LAB
ALBUMIN SERPL BCP-MCNC: 4.6 G/DL (ref 3.5–5.2)
ALP SERPL-CCNC: 46 U/L (ref 55–135)
ALT SERPL W/O P-5'-P-CCNC: 19 U/L (ref 10–44)
ANION GAP SERPL CALC-SCNC: 9 MMOL/L (ref 8–16)
AST SERPL-CCNC: 22 U/L (ref 10–40)
BASOPHILS # BLD AUTO: 0.03 K/UL (ref 0–0.2)
BASOPHILS NFR BLD: 0.4 % (ref 0–1.9)
BILIRUB SERPL-MCNC: 1 MG/DL (ref 0.1–1)
BILIRUB UR QL STRIP: NEGATIVE
BUN SERPL-MCNC: 21 MG/DL (ref 8–23)
CALCIUM SERPL-MCNC: 10 MG/DL (ref 8.7–10.5)
CHLORIDE SERPL-SCNC: 103 MMOL/L (ref 95–110)
CLARITY UR: CLEAR
CO2 SERPL-SCNC: 26 MMOL/L (ref 23–29)
COLOR UR: YELLOW
COMPLEXED PSA SERPL-MCNC: 1.19 NG/ML (ref 0–4)
CREAT SERPL-MCNC: 0.8 MG/DL (ref 0.5–1.4)
DIFFERENTIAL METHOD: ABNORMAL
EOSINOPHIL # BLD AUTO: 0 K/UL (ref 0–0.5)
EOSINOPHIL NFR BLD: 0.1 % (ref 0–8)
ERYTHROCYTE [DISTWIDTH] IN BLOOD BY AUTOMATED COUNT: 11.9 % (ref 11.5–14.5)
ERYTHROCYTE [SEDIMENTATION RATE] IN BLOOD BY WESTERGREN METHOD: 5 MM/HR (ref 0–10)
EST. GFR  (NO RACE VARIABLE): >60 ML/MIN/1.73 M^2
GLUCOSE SERPL-MCNC: 133 MG/DL (ref 70–110)
GLUCOSE UR QL STRIP: NEGATIVE
HCT VFR BLD AUTO: 42.8 % (ref 40–54)
HGB BLD-MCNC: 15.3 G/DL (ref 14–18)
HGB UR QL STRIP: NEGATIVE
IMM GRANULOCYTES # BLD AUTO: 0.02 K/UL (ref 0–0.04)
IMM GRANULOCYTES NFR BLD AUTO: 0.2 % (ref 0–0.5)
KETONES UR QL STRIP: NEGATIVE
LEUKOCYTE ESTERASE UR QL STRIP: NEGATIVE
LYMPHOCYTES # BLD AUTO: 1.2 K/UL (ref 1–4.8)
LYMPHOCYTES NFR BLD: 14 % (ref 18–48)
MCH RBC QN AUTO: 33.1 PG (ref 27–31)
MCHC RBC AUTO-ENTMCNC: 35.7 G/DL (ref 32–36)
MCV RBC AUTO: 93 FL (ref 82–98)
MONOCYTES # BLD AUTO: 0.4 K/UL (ref 0.3–1)
MONOCYTES NFR BLD: 4.9 % (ref 4–15)
NEUTROPHILS # BLD AUTO: 6.9 K/UL (ref 1.8–7.7)
NEUTROPHILS NFR BLD: 80.4 % (ref 38–73)
NITRITE UR QL STRIP: NEGATIVE
NRBC BLD-RTO: 0 /100 WBC
PH UR STRIP: 6 [PH] (ref 5–8)
PLATELET # BLD AUTO: 209 K/UL (ref 150–450)
PMV BLD AUTO: 10.8 FL (ref 9.2–12.9)
POTASSIUM SERPL-SCNC: 4 MMOL/L (ref 3.5–5.1)
PROT SERPL-MCNC: 7.5 G/DL (ref 6–8.4)
PROT UR QL STRIP: ABNORMAL
RBC # BLD AUTO: 4.62 M/UL (ref 4.6–6.2)
SODIUM SERPL-SCNC: 138 MMOL/L (ref 136–145)
SP GR UR STRIP: 1.02 (ref 1–1.03)
URN SPEC COLLECT METH UR: ABNORMAL
UROBILINOGEN UR STRIP-ACNC: NEGATIVE EU/DL
WBC # BLD AUTO: 8.57 K/UL (ref 3.9–12.7)

## 2023-10-11 PROCEDURE — 84153 ASSAY OF PSA TOTAL: CPT | Performed by: INTERNAL MEDICINE

## 2023-10-11 PROCEDURE — 85651 RBC SED RATE NONAUTOMATED: CPT | Performed by: INTERNAL MEDICINE

## 2023-10-11 PROCEDURE — 80053 COMPREHEN METABOLIC PANEL: CPT | Performed by: INTERNAL MEDICINE

## 2023-10-11 PROCEDURE — 81003 URINALYSIS AUTO W/O SCOPE: CPT | Performed by: INTERNAL MEDICINE

## 2023-10-11 PROCEDURE — 85025 COMPLETE CBC W/AUTO DIFF WBC: CPT | Performed by: INTERNAL MEDICINE

## 2023-10-11 PROCEDURE — 86140 C-REACTIVE PROTEIN: CPT | Performed by: INTERNAL MEDICINE

## 2023-10-11 PROCEDURE — 36415 COLL VENOUS BLD VENIPUNCTURE: CPT | Performed by: INTERNAL MEDICINE

## 2023-10-12 LAB — CRP SERPL-MCNC: 0.5 MG/L (ref 0–8.2)

## 2023-10-30 DIAGNOSIS — M54.16 LUMBAR RADICULOPATHY: Primary | ICD-10-CM

## 2023-11-03 ENCOUNTER — HOSPITAL ENCOUNTER (OUTPATIENT)
Dept: RADIOLOGY | Facility: HOSPITAL | Age: 78
Discharge: HOME | End: 2023-11-03
Attending: INTERNAL MEDICINE
Payer: MEDICARE

## 2023-11-03 DIAGNOSIS — M54.16 LUMBAR RADICULOPATHY: ICD-10-CM

## 2023-11-03 PROCEDURE — 72148 MRI LUMBAR SPINE WITHOUT CONTRAST: ICD-10-PCS | Mod: 26,,, | Performed by: RADIOLOGY

## 2023-11-03 PROCEDURE — 72195 MRI SACRUM/COCCYX (BONY) W/O CONTRAST: ICD-10-PCS | Mod: 26,,, | Performed by: RADIOLOGY

## 2023-11-03 PROCEDURE — 72195 MRI PELVIS W/O DYE: CPT | Mod: 26,,, | Performed by: RADIOLOGY

## 2023-11-03 PROCEDURE — 72195 MRI PELVIS W/O DYE: CPT | Mod: TC

## 2023-11-03 PROCEDURE — 72148 MRI LUMBAR SPINE W/O DYE: CPT | Mod: 26,,, | Performed by: RADIOLOGY

## 2023-11-03 PROCEDURE — 72148 MRI LUMBAR SPINE W/O DYE: CPT | Mod: TC

## 2023-11-06 ENCOUNTER — HOSPITAL ENCOUNTER (OUTPATIENT)
Dept: PREADMISSION TESTING | Facility: HOSPITAL | Age: 78
Discharge: HOME | End: 2023-11-06
Attending: INTERNAL MEDICINE
Payer: MEDICARE

## 2023-11-06 DIAGNOSIS — R94.31 NONSPECIFIC ABNORMAL ELECTROCARDIOGRAM (ECG) (EKG): ICD-10-CM

## 2023-11-06 DIAGNOSIS — R94.31 NONSPECIFIC ABNORMAL ELECTROCARDIOGRAM (ECG) (EKG): Primary | ICD-10-CM

## 2023-11-06 PROCEDURE — 93010 ELECTROCARDIOGRAM REPORT: CPT | Mod: ,,, | Performed by: INTERNAL MEDICINE

## 2023-11-06 PROCEDURE — 93005 ELECTROCARDIOGRAM TRACING: CPT | Performed by: INTERNAL MEDICINE

## 2023-11-06 PROCEDURE — 93010 EKG 12-LEAD: ICD-10-PCS | Mod: ,,, | Performed by: INTERNAL MEDICINE

## 2023-11-07 ENCOUNTER — TELEPHONE (OUTPATIENT)
Dept: NEUROSURGERY | Facility: CLINIC | Age: 78
End: 2023-11-07
Payer: MEDICARE

## 2023-11-07 ENCOUNTER — OFFICE VISIT (OUTPATIENT)
Dept: CARDIOLOGY | Facility: CLINIC | Age: 78
End: 2023-11-07
Payer: MEDICARE

## 2023-11-07 ENCOUNTER — TELEPHONE (OUTPATIENT)
Dept: CARDIOLOGY | Facility: CLINIC | Age: 78
End: 2023-11-07
Payer: MEDICARE

## 2023-11-07 VITALS
HEART RATE: 67 BPM | BODY MASS INDEX: 26.77 KG/M2 | OXYGEN SATURATION: 95 % | RESPIRATION RATE: 16 BRPM | DIASTOLIC BLOOD PRESSURE: 74 MMHG | HEIGHT: 70 IN | WEIGHT: 187 LBS | SYSTOLIC BLOOD PRESSURE: 142 MMHG

## 2023-11-07 DIAGNOSIS — Z78.9 STATIN INTOLERANCE: ICD-10-CM

## 2023-11-07 DIAGNOSIS — I48.0 PAROXYSMAL ATRIAL FIBRILLATION: ICD-10-CM

## 2023-11-07 DIAGNOSIS — I25.10 CORONARY ARTERY DISEASE INVOLVING NATIVE CORONARY ARTERY OF NATIVE HEART WITHOUT ANGINA PECTORIS: ICD-10-CM

## 2023-11-07 DIAGNOSIS — E78.5 HYPERLIPIDEMIA WITH TARGET LOW DENSITY LIPOPROTEIN (LDL) CHOLESTEROL LESS THAN 70 MG/DL: ICD-10-CM

## 2023-11-07 DIAGNOSIS — R94.31 NONSPECIFIC ABNORMAL ELECTROCARDIOGRAM (ECG) (EKG): ICD-10-CM

## 2023-11-07 DIAGNOSIS — I10 BENIGN HYPERTENSION: ICD-10-CM

## 2023-11-07 PROBLEM — M54.50 LOW BACK PAIN: Status: ACTIVE | Noted: 2023-11-07

## 2023-11-07 PROCEDURE — 99999 PR PBB SHADOW E&M-EST. PATIENT-LVL IV: CPT | Mod: PBBFAC,,, | Performed by: INTERNAL MEDICINE

## 2023-11-07 PROCEDURE — 99214 OFFICE O/P EST MOD 30 MIN: CPT | Mod: PBBFAC,PN | Performed by: INTERNAL MEDICINE

## 2023-11-07 PROCEDURE — 99214 OFFICE O/P EST MOD 30 MIN: CPT | Mod: S$PBB,,, | Performed by: INTERNAL MEDICINE

## 2023-11-07 PROCEDURE — 99214 PR OFFICE/OUTPT VISIT, EST, LEVL IV, 30-39 MIN: ICD-10-PCS | Mod: S$PBB,,, | Performed by: INTERNAL MEDICINE

## 2023-11-07 PROCEDURE — 93010 EKG 12-LEAD: ICD-10-PCS | Mod: S$PBB,,, | Performed by: INTERNAL MEDICINE

## 2023-11-07 PROCEDURE — 93010 ELECTROCARDIOGRAM REPORT: CPT | Mod: S$PBB,,, | Performed by: INTERNAL MEDICINE

## 2023-11-07 PROCEDURE — 99999 PR PBB SHADOW E&M-EST. PATIENT-LVL IV: ICD-10-PCS | Mod: PBBFAC,,, | Performed by: INTERNAL MEDICINE

## 2023-11-07 PROCEDURE — 93005 ELECTROCARDIOGRAM TRACING: CPT | Mod: PBBFAC,PN | Performed by: INTERNAL MEDICINE

## 2023-11-07 NOTE — ASSESSMENT & PLAN NOTE
Patient is maintaining sinus rhythm at this time  Continue on sotalol his QTC is 410 milliseconds.  Maintain on Xarelto 20 mg daily and Betapace 80 mg p.o. b.i.d..

## 2023-11-07 NOTE — TELEPHONE ENCOUNTER
----- Message from Stacey M Lefort sent at 11/7/2023 11:01 AM CST -----  Pt is calling regarding a referral appt - his callback is [phone]. Thank you.

## 2023-11-07 NOTE — ASSESSMENT & PLAN NOTE
He is presently on Repatha at 140 mg every 2 weeks.  Seem to be working fairly well at this time continue the same

## 2023-11-07 NOTE — TELEPHONE ENCOUNTER
----- Message from Erlinda Maldonado sent at 11/7/2023  8:37 AM CST -----  Contact: pt [phone]  Type:  Same Day Appointment Request    Caller is requesting a same day appointment.  Caller declined first available appointment listed below.      Name of Caller:  Pt   When is the first available appointment?  Dec 5  Symptoms:  Surgery clearance   Best Call Back Number:  [phone]    Additional Information:  Pt stated he only wants to see Dr Garza

## 2023-11-07 NOTE — ASSESSMENT & PLAN NOTE
He has abnormal EKG with anterior ischemia suggested.  Recommend to obtain Lexiscan Myoview to evaluate for any progression of ischemic heart disease.  He may be in need of surgical intervention in the near future if the conservative treatment does not improve.  Obtain an echocardiogram for LV function assessment and wall motion abnormalities as well

## 2023-11-07 NOTE — TELEPHONE ENCOUNTER
Returned call to pt and scheduled new pt appt. Added pt to wait list to be contacted if sooner appt becomes available. Pt voiced understanding to appt details.

## 2023-11-07 NOTE — ASSESSMENT & PLAN NOTE
Blood pressure is reasonably controlled given that he is some pain from low back at this time.  Continue on tear terazosin 5 mg at nighttime, hydrochlorothiazide 12.5 mg daily, maintain low-salt low-fat diet.

## 2023-11-07 NOTE — PROGRESS NOTES
Subjective:    Patient ID:  Taco Odonnell Jr is a 77 y.o. male patient here for evaluation Follow-up (He has an abnormal ekg and mri of his back. )      History of Present Illness:     A 77-year-old gentleman with history of known coronary artery disease and previous bypass surgery in  has been doing fairly well.  He is suffered from new onset low back pain and he was treated conservatively and subsequently had MRI and this is showing multilevel degenerative spine disease with changes in the vertebral height as well as desiccation with this bulging severe bilateral facet arthropathy at multiple levels in the lumbar spine.  He is scheduled to have an orthopedic evaluation with spine specialist and he is noted to have abnormal EKG in the meanwhile he is referred here for cardiac evaluation.    Mr. Odonnell denies having chest discomfort no arm neck or jaw pain he is to the point where he is using a Rollator for support for his mobility.    No significant shortness of breath with normal physical activity noted.  No cough or congestion no fevers chills no swelling in the lower extremities      Review of patient's allergies indicates:   Allergen Reactions    Penicillin g Other (See Comments)     Showed on allergy test        Past Medical History:   Diagnosis Date    Benign hypertension     Coronary artery disease     Glaucoma     Hyperlipidemia LDL goal < 70      Past Surgical History:   Procedure Laterality Date    CORONARY ARTERY BYPASS GRAFT      ROTATOR CUFF REPAIR       Social History     Tobacco Use    Smoking status: Former     Current packs/day: 0.00     Average packs/day: 1 pack/day for 40.0 years (40.0 ttl pk-yrs)     Types: Cigarettes     Start date: 1962     Quit date: 2002     Years since quittin.8    Smokeless tobacco: Never   Substance Use Topics    Alcohol use: No    Drug use: No        Review of Systems:    As noted in HPI in addition      REVIEW OF SYSTEMS  CARDIOVASCULAR: No  recent chest pain, palpitations, arm, neck, or jaw pain  RESPIRATORY: No recent fever, cough chills, SOB or congestion  : No blood in the urine  GI: No Nausea, vomiting, constipation, diarrhea, blood, or reflux.  MUSCULOSKELETAL:  Severe low back pain limiting his physical activity NEURO: No lightheadedness or dizziness  EYES: No Double vision, blurry, vision or headache     Previously he was able to walk about 3-5 miles on average day and in the past 6 weeks his activities literally come to a stand still and is presently on steroid therapy       Objective        Vitals:    11/07/23 1552   BP: (!) 142/74   Pulse: 67   Resp: 16       LIPIDS - LAST 2   Lab Results   Component Value Date    CHOL 117 (L) 06/07/2023    CHOL 113 (L) 12/07/2022    HDL 51 06/07/2023    HDL 62 12/07/2022    LDLCALC 37.0 (L) 06/07/2023    LDLCALC 38.8 (L) 12/07/2022    TRIG 145 06/07/2023    TRIG 61 12/07/2022    CHOLHDL 43.6 06/07/2023    CHOLHDL 54.9 (H) 12/07/2022       CBC - LAST 2  Lab Results   Component Value Date    WBC 8.57 10/11/2023    WBC 5.87 06/07/2023    RBC 4.62 10/11/2023    RBC 4.38 (L) 06/07/2023    HGB 15.3 10/11/2023    HGB 14.5 06/07/2023    HCT 42.8 10/11/2023    HCT 42.3 06/07/2023    MCV 93 10/11/2023    MCV 97 06/07/2023    MCH 33.1 (H) 10/11/2023    MCH 33.1 (H) 06/07/2023    MCHC 35.7 10/11/2023    MCHC 34.3 06/07/2023    RDW 11.9 10/11/2023    RDW 12.3 06/07/2023     10/11/2023     06/07/2023    MPV 10.8 10/11/2023    MPV 10.6 06/07/2023    GRAN 6.9 10/11/2023    GRAN 80.4 (H) 10/11/2023    LYMPH 1.2 10/11/2023    LYMPH 14.0 (L) 10/11/2023    MONO 0.4 10/11/2023    MONO 4.9 10/11/2023    BASO 0.03 10/11/2023    BASO 0.05 06/07/2023    NRBC 0 10/11/2023    NRBC 0 06/07/2023       CHEMISTRY & LIVER FUNCTION - LAST 2  Lab Results   Component Value Date     10/11/2023     06/07/2023    K 4.0 10/11/2023    K 4.2 06/07/2023     10/11/2023     06/07/2023    CO2 26 10/11/2023    CO2  "26 06/07/2023    ANIONGAP 9 10/11/2023    ANIONGAP 6 (L) 06/07/2023    BUN 21 10/11/2023    BUN 11 06/07/2023    CREATININE 0.8 10/11/2023    CREATININE 0.7 06/07/2023     (H) 10/11/2023     06/07/2023    CALCIUM 10.0 10/11/2023    CALCIUM 9.1 06/07/2023    MG 2.0 02/24/2020    ALBUMIN 4.6 10/11/2023    ALBUMIN 4.2 06/07/2023    PROT 7.5 10/11/2023    PROT 6.8 06/07/2023    ALKPHOS 46 (L) 10/11/2023    ALKPHOS 49 (L) 06/07/2023    ALT 19 10/11/2023    ALT 25 06/07/2023    AST 22 10/11/2023    AST 22 06/07/2023    BILITOT 1.0 10/11/2023    BILITOT 1.4 (H) 06/07/2023        CARDIAC PROFILE - LAST 2  Lab Results   Component Value Date     (H) 04/26/2021        COAGULATION - LAST 2  No results found for: "LABPT", "INR", "APTT"    ENDOCRINE & PSA - LAST 2  Lab Results   Component Value Date    HGBA1C 5.7 06/07/2023    HGBA1C 5.4 12/07/2022    TSH 1.000 12/07/2022    PSA 1.19 10/11/2023    PSA 1.3 07/05/2022        ECHOCARDIOGRAM RESULTS  No results found for this or any previous visit.      CURRENT/PREVIOUS VISIT EKG  Results for orders placed or performed during the hospital encounter of 11/06/23   EKG 12-lead    Collection Time: 11/06/23 11:47 AM    Narrative    Test Reason : R94.31,    Vent. Rate : 071 BPM     Atrial Rate : 071 BPM     P-R Int : 138 ms          QRS Dur : 086 ms      QT Int : 374 ms       P-R-T Axes : 067 014 074 degrees     QTc Int : 406 ms    Sinus rhythm with marked sinus arrythmia  Inferior infarct (cited on or before 06-NOV-2023)  ST and T wave abnormality, consider anterior ischemia  Abnormal ECG  When compared with ECG of 06-NOV-2023 11:40,  No significant change was found    Referred By: ISHMAEL POPE           Confirmed By:      No valid procedures specified.   No results found for this or any previous visit.    His EKG obtained on 11/06/2023 shows sinus rhythm with sinus arrhythmia  Inferior infarct pattern ST T wave abnormalities in the anterior leads noted.    Eleven 7 " 2023 EKG shows normal sinus rhythm left atrial enlargement ST T wave abnormalities possibly anterior ischemia.    PHYSICAL EXAM  CONSTITUTIONAL: Well built, well nourished in no apparent distress  NECK: no carotid bruit, no JVD  LUNGS: CTA  CHEST WALL: no tenderness  HEART: regular rate and rhythm, S1, S2 normal, no murmur, click, rub or gallop   ABDOMEN: soft, non-tender; bowel sounds normal; no masses,  no organomegaly  EXTREMITIES: Extremities normal, no edema, no calf tenderness noted  NEURO: AAO X 3    I HAVE REVIEWED :    The vital signs, nurses notes, and all the pertinent radiology and labs.        Current Outpatient Medications   Medication Instructions    aspirin (ECOTRIN) 81 mg, Oral, Daily,      coenzyme Q10 200 mg, Oral, Daily,      doxycycline (VIBRA-TABS) 100 MG tablet Take 1 tablet twice a day for 7 days    FLAXSEED OIL ORAL 1,200 mg, Oral, 2 times daily,      hydroCHLOROthiazide (HYDRODIURIL) 12.5 mg, Oral, Every morning    multivitamin capsule 1 capsule, Oral, Daily,      oxyCODONE-acetaminophen (PERCOCET) 5-325 mg per tablet TAKE 1 TABLET EVERY 4 HOURS AS NEEEDED FOR HIP PAIN    oxyCODONE-acetaminophen (PERCOCET) 5-325 mg per tablet Take 1 tablet by mouth every 4 (four) hours as needed for for lumbar spinal stenosis & sciatica..    predniSONE (DELTASONE) 20 MG tablet Take 0.5 to 1 tablet (20 mg total) by mouth every 12 (twelve) hours for sciatica.    predniSONE (DELTASONE) 5 MG tablet Take 1 tablet twice a day for 5 days    predniSONE (DELTASONE) 5 MG tablet Take 1 tablet (5 mg total) by mouth 2 (two) times daily as needed for lumbar spinal stenosis & sciatica.    promethazine-codeine 6.25-10 mg/5 ml (PHENERGAN WITH CODEINE) 6.25-10 mg/5 mL syrup Take 5 ml by mouth every 4 hours occasionally as needed for cough    REPATHA SURECLICK 140 mg, Subcutaneous, Every 14 days    rivaroxaban (XARELTO) 20 mg, Oral, Daily    sotaloL (BETAPACE) 80 mg, Oral, 2 times daily    terazosin (HYTRIN) 5 mg, Oral,  Nightly          Assessment & Plan     Nonspecific abnormal electrocardiogram (ECG) (EKG)  He has abnormal EKG with anterior ischemia suggested.  Recommend to obtain Lexiscan Myoview to evaluate for any progression of ischemic heart disease.  He may be in need of surgical intervention in the near future if the conservative treatment does not improve.  Obtain an echocardiogram for LV function assessment and wall motion abnormalities as well    Paroxysmal atrial fibrillation  Patient is maintaining sinus rhythm at this time  Continue on sotalol his QTC is 410 milliseconds.  Maintain on Xarelto 20 mg daily and Betapace 80 mg p.o. b.i.d..    Benign hypertension  Blood pressure is reasonably controlled given that he is some pain from low back at this time.  Continue on tear terazosin 5 mg at nighttime, hydrochlorothiazide 12.5 mg daily, maintain low-salt low-fat diet.    Hyperlipidemia LDL goal < 70  He is presently on Repatha at 140 mg every 2 weeks.  Seem to be working fairly well at this time continue the same    Statin intolerance  Patient has history of severe statin intolerance.    Low back pain  Patient has severe degenerative arthropathy in the lumbar spine with resultant severe pain limiting his physical activity.  He is awaiting to see a back specialist regarding the same.  In the meantime he is trying some conservative treatment including steroid therapy as directed by Dr. Rosales aguirre in the meantime will evaluate for cardiac stability in preparation for potential need for surgery          No follow-ups on file.

## 2023-11-07 NOTE — ASSESSMENT & PLAN NOTE
Patient has severe degenerative arthropathy in the lumbar spine with resultant severe pain limiting his physical activity.  He is awaiting to see a back specialist regarding the same.  In the meantime he is trying some conservative treatment including steroid therapy as directed by Dr. Rosales aguirre in the meantime will evaluate for cardiac stability in preparation for potential need for surgery

## 2023-11-07 NOTE — ASSESSMENT & PLAN NOTE
Coronary artery disease with previous bypass surgery now noted to have abnormal EKG will evaluate further.  For progression of ischemic heart disease.

## 2023-11-08 ENCOUNTER — TELEPHONE (OUTPATIENT)
Dept: NEUROSURGERY | Facility: CLINIC | Age: 78
End: 2023-11-08
Payer: MEDICARE

## 2023-11-08 NOTE — TELEPHONE ENCOUNTER
----- Message from Edwin France Patient Care Assistant sent at 11/8/2023  9:09 AM CST -----  Contact: Dr LORENZO Garza Office  Type: Needs Medical Advice    Who Called: Dr LORENZO Garza Office/Nery Melgoza Call Back Number: [phone]  Inquiry/Question: Nery is calling to see if the pt can be seen sooner due to he is having a procedure next week. Please advise Thank you~

## 2023-11-09 ENCOUNTER — OFFICE VISIT (OUTPATIENT)
Dept: NEUROSURGERY | Facility: CLINIC | Age: 78
End: 2023-11-09
Payer: MEDICARE

## 2023-11-09 VITALS — DIASTOLIC BLOOD PRESSURE: 82 MMHG | SYSTOLIC BLOOD PRESSURE: 152 MMHG | HEART RATE: 72 BPM

## 2023-11-09 DIAGNOSIS — M54.16 LUMBAR RADICULOPATHY: ICD-10-CM

## 2023-11-09 DIAGNOSIS — M43.17 ACQUIRED SPONDYLOLISTHESIS OF LUMBOSACRAL REGION: ICD-10-CM

## 2023-11-09 DIAGNOSIS — M48.061 LUMBAR STENOSIS WITHOUT NEUROGENIC CLAUDICATION: ICD-10-CM

## 2023-11-09 DIAGNOSIS — M51.36 DDD (DEGENERATIVE DISC DISEASE), LUMBAR: Primary | ICD-10-CM

## 2023-11-09 PROCEDURE — 99205 PR OFFICE/OUTPT VISIT, NEW, LEVL V, 60-74 MIN: ICD-10-PCS | Mod: S$PBB,,, | Performed by: NEUROLOGICAL SURGERY

## 2023-11-09 PROCEDURE — 99205 OFFICE O/P NEW HI 60 MIN: CPT | Mod: S$PBB,,, | Performed by: NEUROLOGICAL SURGERY

## 2023-11-10 ENCOUNTER — TELEPHONE (OUTPATIENT)
Dept: CARDIOLOGY | Facility: HOSPITAL | Age: 78
End: 2023-11-10

## 2023-11-10 DIAGNOSIS — Z01.818 PREOP EXAMINATION: ICD-10-CM

## 2023-11-10 DIAGNOSIS — M47.816 LUMBAR SPONDYLOSIS: Primary | ICD-10-CM

## 2023-11-10 DIAGNOSIS — M79.606 PAIN OF LOWER EXTREMITY, UNSPECIFIED LATERALITY: ICD-10-CM

## 2023-11-10 RX ORDER — MUPIROCIN 20 MG/G
OINTMENT TOPICAL
Status: CANCELLED | OUTPATIENT
Start: 2023-11-10

## 2023-11-10 RX ORDER — MUPIROCIN 20 MG/G
1 OINTMENT TOPICAL 2 TIMES DAILY
Status: CANCELLED | OUTPATIENT
Start: 2023-11-10 | End: 2023-11-11

## 2023-11-10 NOTE — PROGRESS NOTES
HPI:  This is a very pleasant 77-year-old gentleman, high functioning for his age, who endorses chronic low back pain which has waxed and waned in severity over the last several years.  He presents today, however, primarily due to recent onset of severe left lumbosacral pain with radiation into the left hip, lateral thigh, lateral calf, lateral ankle.  The pain began insidiously upon wakening and standing on the left leg approximately 4-5 weeks ago.  He denies known injury or trauma.  He describes constant aching stabbing pain.  The pain is worse with prolonged standing or walking and somewhat relieved with sitting or lying supine.  He denies extremity weakness, numbness, saddle anesthesia, sphincter dysfunction.  He denies right lower extremity symptoms.  He reports no worsening of his chronic low back pain.  Prior to the onset of the pain he was walking 4-5 miles daily.  He notes that he is now using a rolling walker to assist with ambulation.  He has been taking Percocet 5/325 for the past 4 weeks with little, if any, improvement.  He was also prescribed a short course of prednisone.  He has not undergone interventional pain procedures.    He has a history of CAD, status post CABG in , on baby aspirin.  He has a history of atrial fibrillation, on Xarelto.  He has a history of well-controlled hypertension.  He is followed by Dr. Rosales Gallegos.      Smoking status:  Former, quit , 40 pack years.  Past Medical History:   Diagnosis Date    Benign hypertension     Coronary artery disease     Glaucoma     Hyperlipidemia LDL goal < 70       Past Surgical History:   Procedure Laterality Date    CORONARY ARTERY BYPASS GRAFT      ROTATOR CUFF REPAIR       Social History     Tobacco Use    Smoking status: Former     Current packs/day: 0.00     Average packs/day: 1 pack/day for 40.0 years (40.0 ttl pk-yrs)     Types: Cigarettes     Start date: 1962     Quit date: 2002     Years since quittin.8     Smokeless tobacco: Never   Substance Use Topics    Alcohol use: No    Drug use: No       Review of Systems: All systems reviewed and are as above or otherwise negative.    General: well developed, well nourished, no distress.   Head: normocephalic, atraumatic  Eyes: pupils equal, round, reactive to light with accomodation, EOMI.   Neck: trachea midline. No JVD  Cardiovascular: No LE edema   Pulmonary: normal respirations, no signs of respiratory distress  Abdomen: soft, non-distended, not tender to palpation  Sensory: intact to light touch throughout  Extremities: No bruising, deformity  Skin: Skin is warm, dry and intact.    Motor Strength: Moves all extremities spontaneously with good tone.  Full strength upper and lower extremities. No abnormal movements seen.     Strength  Deltoids Triceps Biceps Wrist Extension Wrist Flexion Hand    Upper: R 5/5 5/5 5/5 5/5 5/5 5/5    L 5/5 5/5 5/5 5/5 5/5 5/5     Iliopsoas Quadriceps Knee  Flexion Tibialis  anterior Gastro- cnemius EHL   Lower: R 5/5 5/5 5/5 5/5 5/5 5/5    L 5/5 5/5 5/5 5/5 5/5 5/5     Neurologic: Alert and oriented. Thought content appropriate.  GCS: Motor: 6/Verbal: 5/Eyes: 4 GCS Total: 15  Mental Status: Awake, Alert, Oriented x 4  Language: No aphasia  Speech: No dysarthria  Cranial nerves: face symmetric, tongue midline, CN II-XII grossly intact.     Pronator Drift: no drift noted  Finger-to-nose: Intact bilaterally  DTR's: 2 + and symmetric in UE and LE  Lin: absent  Clonus: absent  Babinski: absent    Gait: normal    Tandem Gait: No difficulty           Able to walk on heels & toes    Cervical Spine  ROM: full    Nontender to palpation    Lumbar Spine   ROM: full   Nontender to palpation    Pain on Hip ROM: Negative  Straight leg raise: negative bilaterally     SI Joint tenderness: Negative bilaterally   LEX: Negative bilaterally      Significant Exam Findings:  Motor and sensory intact.  Straight leg raising positive on the left at 90°.   Tender to palpation left lumbosacral region.  Stooped posture.  Antalgic gait left.    Significant Radiology Findings:  I personally reviewed MRI lumbar and sacrum in detail with the patient today.  Date of study 11/03/2023.  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  Mild left SI joint edema.  In the lumbar region there are extensive degenerative changes centered at L2-3 through L4-5.  There is multilevel moderate to severe degenerative disc disease.  There is multifocal lumbar spondylolisthesis.  Moderate to severe central canal stenosis and right foraminal stenosis L2-3.  Moderate to severe central canal stenosis and moderate right foraminal stenosis L3-4.  Severe central canal and left foraminal stenosis L4-5 with absence of CSF signal.  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    Analysis:  His symptoms are severe and have been refractory to nonoperative management for the past several weeks.  Based on his clinical and radiographic findings he would benefit from surgical intervention.  We had a lengthy discussion regarding surgical management options.  He has a combination of chronic axial back pain due to the multilevel degenerative disc disease L2-3 through L4-5.  He has severe left L4, L5 radiculopathy most likely due to the severe neural element compromise at L4-5.  The left leg pain is presently more problematic for him.  As such, I outlined the option of focal decompression with resection of extradural mass at L4-5.  Alternatively, he can consider a more extensive decompression without fusion extending from L2-3 through L4-5,.  Finally, decompression with instrumented arthrodesis L2-3 through L4-5 would address all pathology and associated symptoms.  I outlined the potential benefits associated with all above treatment options.  I outlined the potential risks associated with all the above treatment options.   Relative to focal decompression, I explained that this may worsen his axial back pain over time.  Relative to the more extensive procedure addressing all of the elements of pathology, I explained that this would entail a longer postoperative recovery.  I also explained that continued nonoperative management is reasonable as he remains neurologically intact.  Lumbar interventional pain procedures would be a reasonable nonoperative modality.  He voiced understanding of all the above.  At conclusion, he elects to proceed with open L4-5 bilateral laminectomy, foraminotomy, with resection of the extradural mass.  He is aware that there is an increased risk for CSF leakage due to the likely adherence of the mass to the dura.  He is aware that additional surgery relative to the other areas of pathology may be indicated in the future.  We will request preoperative risk stratification with Dr. Gallegos.  He will also benefit from preoperative cardiac clearance.  He will need to be off the Xarelto or 3 days prior to surgery.  He will need to be off aspirin 7 days prior to surgery.  I anticipate resuming both medications following wound drain removal on postoperative day every 2 or 3.    Taco was seen today for hip pain.    Diagnoses and all orders for this visit:    DDD (degenerative disc disease), lumbar    Lumbar stenosis without neurogenic claudication    Lumbar radiculopathy    Acquired spondylolisthesis of lumbosacral region      I spent a total of 60 minutes on the day of the visit.  This includes face to face time and non-face to face time preparing to see the patient (eg, review of tests), obtaining and/or reviewing separately obtained history, documenting clinical information in the electronic or other health record, independently interpreting results and communicating results to the patient/family/caregiver, or care coordinator.

## 2023-11-10 NOTE — TELEPHONE ENCOUNTER
Patient advised, test will be at [facility] ([address]   Will need to register on the first floor at the main entrance.   Patient advised that arrival time is 6:50am.  Patient advised that he may be here about 3.5-4 hours, and may want to bring something to occupy their time, as there will be periods of waiting.    Patient advised, may take his medications prior to testing if you need to.   Advised if he needs to eat to take his medications, please keep it light, like toast and juice.    Patient advised to avoid all caffeine 12 hours prior to testing.  This includes decaf tea and coffee.    Will provide peanut butter crackers for a snack after stress test.  If patient would prefer something else, please bring a snack from home.    Wear comfortable clothing.   No lotions, oils, or powders to the upper chest area. May wear deodorant.    No metal jewelry, buttons, or zippers to the upper body.  Patient verbalizes understanding of instructions.

## 2023-11-13 ENCOUNTER — HOSPITAL ENCOUNTER (OUTPATIENT)
Dept: RADIOLOGY | Facility: HOSPITAL | Age: 78
Discharge: HOME | End: 2023-11-13
Attending: INTERNAL MEDICINE
Payer: MEDICARE

## 2023-11-13 ENCOUNTER — HOSPITAL ENCOUNTER (OUTPATIENT)
Dept: CARDIOLOGY | Facility: HOSPITAL | Age: 78
Discharge: HOME | End: 2023-11-13
Attending: INTERNAL MEDICINE
Payer: MEDICARE

## 2023-11-13 VITALS — WEIGHT: 186.94 LBS | BODY MASS INDEX: 26.76 KG/M2 | HEIGHT: 70 IN

## 2023-11-13 DIAGNOSIS — Z78.9 STATIN INTOLERANCE: ICD-10-CM

## 2023-11-13 DIAGNOSIS — I10 BENIGN HYPERTENSION: ICD-10-CM

## 2023-11-13 DIAGNOSIS — R94.31 NONSPECIFIC ABNORMAL ELECTROCARDIOGRAM (ECG) (EKG): ICD-10-CM

## 2023-11-13 DIAGNOSIS — E78.5 HYPERLIPIDEMIA WITH TARGET LOW DENSITY LIPOPROTEIN (LDL) CHOLESTEROL LESS THAN 70 MG/DL: ICD-10-CM

## 2023-11-13 DIAGNOSIS — I48.0 PAROXYSMAL ATRIAL FIBRILLATION: ICD-10-CM

## 2023-11-13 DIAGNOSIS — I25.10 CORONARY ARTERY DISEASE INVOLVING NATIVE CORONARY ARTERY OF NATIVE HEART WITHOUT ANGINA PECTORIS: ICD-10-CM

## 2023-11-13 LAB
AORTIC ROOT ANNULUS: 3.5 CM
AORTIC VALVE CUSP SEPERATION: 1.8 CM
AV INDEX (PROSTH): 0.79
AV MEAN GRADIENT: 5 MMHG
AV PEAK GRADIENT: 8 MMHG
AV REGURGITATION PRESSURE HALF TIME: 741 MS
AV VALVE AREA BY VELOCITY RATIO: 2.67 CM²
AV VALVE AREA: 2.72 CM²
AV VELOCITY RATIO: 0.77
BSA FOR ECHO PROCEDURE: 2.05 M2
CV ECHO LV RWT: 0.4 CM
CV PHARM DOSE: 0.4 MG
CV STRESS BASE HR: 62 BPM
DIASTOLIC BLOOD PRESSURE: 91 MMHG
DOP CALC AO PEAK VEL: 1.44 M/S
DOP CALC AO VTI: 34.5 CM
DOP CALC LVOT AREA: 3.5 CM2
DOP CALC LVOT DIAMETER: 2.1 CM
DOP CALC LVOT PEAK VEL: 1.11 M/S
DOP CALC LVOT STROKE VOLUME: 93.82 CM3
DOP CALC MV VTI: 29.2 CM
DOP CALCLVOT PEAK VEL VTI: 27.1 CM
E WAVE DECELERATION TIME: 264 MSEC
E/A RATIO: 0.71
E/E' RATIO: 7.23 M/S
ECHO LV POSTERIOR WALL: 1.08 CM (ref 0.6–1.1)
EJECTION FRACTION- HIGH: 65 %
END DIASTOLIC INDEX-HIGH: 153 ML/M2
END DIASTOLIC INDEX-LOW: 93 ML/M2
END SYSTOLIC INDEX-HIGH: 71 ML/M2
END SYSTOLIC INDEX-LOW: 31 ML/M2
FRACTIONAL SHORTENING: 37 % (ref 28–44)
INTERVENTRICULAR SEPTUM: 1.09 CM (ref 0.6–1.1)
IVC DIAMETER: 1.69 CM
IVRT: 106 MSEC
LEFT ATRIUM SIZE: 3.7 CM
LEFT ATRIUM VOLUME INDEX MOD: 24.3 ML/M2
LEFT ATRIUM VOLUME MOD: 49.4 CM3
LEFT INTERNAL DIMENSION IN SYSTOLE: 3.4 CM (ref 2.1–4)
LEFT VENTRICLE DIASTOLIC VOLUME INDEX: 68.97 ML/M2
LEFT VENTRICLE DIASTOLIC VOLUME: 140 ML
LEFT VENTRICLE MASS INDEX: 113 G/M2
LEFT VENTRICLE SYSTOLIC VOLUME INDEX: 23.3 ML/M2
LEFT VENTRICLE SYSTOLIC VOLUME: 47.4 ML
LEFT VENTRICULAR INTERNAL DIMENSION IN DIASTOLE: 5.38 CM (ref 3.5–6)
LEFT VENTRICULAR MASS: 229.11 G
LV LATERAL E/E' RATIO: 5.88 M/S
LV SEPTAL E/E' RATIO: 9.4 M/S
LVOT MG: 2 MMHG
LVOT MV: 0.69 CM/S
MV MEAN GRADIENT: 1 MMHG
MV PEAK A VEL: 0.66 M/S
MV PEAK E VEL: 0.47 M/S
MV PEAK GRADIENT: 3 MMHG
MV VALVE AREA BY CONTINUITY EQUATION: 3.21 CM2
NUC REST DIASTOLIC VOLUME INDEX: 68
NUC REST EJECTION FRACTION: 57
NUC REST SYSTOLIC VOLUME INDEX: 29
NUC STRESS DIASTOLIC VOLUME INDEX: 65
NUC STRESS EJECTION FRACTION: 60 %
NUC STRESS SYSTOLIC VOLUME INDEX: 26
OHS CV CPX 1 MINUTE RECOVERY HEART RATE: 75 BPM
OHS CV CPX 85 PERCENT MAX PREDICTED HEART RATE MALE: 122
OHS CV CPX MAX PREDICTED HEART RATE: 143
OHS CV CPX PATIENT IS FEMALE: 0
OHS CV CPX PATIENT IS MALE: 1
OHS CV CPX PEAK DIASTOLIC BLOOD PRESSURE: 79 MMHG
OHS CV CPX PEAK HEAR RATE: 75 BPM
OHS CV CPX PEAK RATE PRESSURE PRODUCT: 8850
OHS CV CPX PEAK SYSTOLIC BLOOD PRESSURE: 118 MMHG
OHS CV CPX PERCENT MAX PREDICTED HEART RATE ACHIEVED: 52
OHS CV CPX RATE PRESSURE PRODUCT PRESENTING: NORMAL
PISA AR MAX VEL: 3.9 M/S
PISA MRMAX VEL: 5.12 M/S
PV MV: 0.82 M/S
PV PEAK GRADIENT: 5 MMHG
PV PEAK VELOCITY: 1.12 M/S
RA PRESSURE ESTIMATED: 3 MMHG
RETIRED EF AND QEF - SEE NOTES: 53 %
RIGHT VENTRICULAR END-DIASTOLIC DIMENSION: 2.25 CM
RV TISSUE DOPPLER FREE WALL SYSTOLIC VELOCITY 1 (APICAL 4 CHAMBER VIEW): 14.5 CM/S
STRESS ST DEPRESSION: 1.2 MM
SYSTOLIC BLOOD PRESSURE: 173 MMHG
TDI LATERAL: 0.08 M/S
TDI SEPTAL: 0.05 M/S
TDI: 0.07 M/S
TRICUSPID ANNULAR PLANE SYSTOLIC EXCURSION: 1.46 CM
Z-SCORE OF LEFT VENTRICULAR DIMENSION IN END DIASTOLE: -1.12
Z-SCORE OF LEFT VENTRICULAR DIMENSION IN END SYSTOLE: -0.64

## 2023-11-13 PROCEDURE — 93306 ECHO (CUPID ONLY): ICD-10-PCS | Mod: 26,,, | Performed by: INTERNAL MEDICINE

## 2023-11-13 PROCEDURE — 78452 HT MUSCLE IMAGE SPECT MULT: CPT | Mod: 26,,, | Performed by: INTERNAL MEDICINE

## 2023-11-13 PROCEDURE — 93016 NUCLEAR STRESS - CARDIOLOGY INTERPRETED (CUPID ONLY): ICD-10-PCS | Mod: ,,,

## 2023-11-13 PROCEDURE — 93306 TTE W/DOPPLER COMPLETE: CPT | Mod: 26,,, | Performed by: INTERNAL MEDICINE

## 2023-11-13 PROCEDURE — 93017 CV STRESS TEST TRACING ONLY: CPT

## 2023-11-13 PROCEDURE — 78452 NUCLEAR STRESS - CARDIOLOGY INTERPRETED (CUPID ONLY): ICD-10-PCS | Mod: 26,,, | Performed by: INTERNAL MEDICINE

## 2023-11-13 PROCEDURE — 93018 CV STRESS TEST I&R ONLY: CPT | Mod: ,,, | Performed by: INTERNAL MEDICINE

## 2023-11-13 PROCEDURE — 93306 TTE W/DOPPLER COMPLETE: CPT

## 2023-11-13 PROCEDURE — 93018 NUCLEAR STRESS - CARDIOLOGY INTERPRETED (CUPID ONLY): ICD-10-PCS | Mod: ,,, | Performed by: INTERNAL MEDICINE

## 2023-11-13 PROCEDURE — 93016 CV STRESS TEST SUPVJ ONLY: CPT | Mod: ,,,

## 2023-11-13 RX ORDER — REGADENOSON 0.08 MG/ML
0.4 INJECTION, SOLUTION INTRAVENOUS ONCE
Status: COMPLETED | OUTPATIENT
Start: 2023-11-13 | End: 2023-11-13

## 2023-11-13 RX ADMIN — REGADENOSON 0.4 MG: 0.08 INJECTION, SOLUTION INTRAVENOUS at 08:11

## 2023-11-17 ENCOUNTER — TELEPHONE (OUTPATIENT)
Dept: CARDIOLOGY | Facility: CLINIC | Age: 78
End: 2023-11-17
Payer: MEDICARE

## 2023-11-17 ENCOUNTER — HOSPITAL ENCOUNTER (OUTPATIENT)
Dept: RADIOLOGY | Facility: HOSPITAL | Age: 78
Discharge: HOME | End: 2023-11-17
Attending: NEUROLOGICAL SURGERY
Payer: MEDICARE

## 2023-11-17 ENCOUNTER — HOSPITAL ENCOUNTER (OUTPATIENT)
Dept: PREADMISSION TESTING | Facility: HOSPITAL | Age: 78
Discharge: HOME | End: 2023-11-17
Attending: NEUROLOGICAL SURGERY
Payer: MEDICARE

## 2023-11-17 DIAGNOSIS — M47.816 LUMBAR SPONDYLOSIS: ICD-10-CM

## 2023-11-17 DIAGNOSIS — Z01.818 PREOP EXAMINATION: ICD-10-CM

## 2023-11-17 PROCEDURE — 71046 X-RAY EXAM CHEST 2 VIEWS: CPT | Mod: TC

## 2023-11-17 PROCEDURE — 71046 XR CHEST PA AND LATERAL: ICD-10-PCS | Mod: 26,,, | Performed by: RADIOLOGY

## 2023-11-17 PROCEDURE — 71046 X-RAY EXAM CHEST 2 VIEWS: CPT | Mod: 26,,, | Performed by: RADIOLOGY

## 2023-11-17 NOTE — DISCHARGE INSTRUCTIONS
To confirm, Your doctor has instructed you that surgery is scheduled for: 11/29/23 with DR. Gallegos    Please report to [facility], Registration the morning of surgery. You must check-in and receive a wristband before going to your procedure.  [address] [place] [address] [place] [address]    Pre-Op will call the afternoon prior to surgery between 1:00 and 6:00 PM with the final arrival time.  Phone number: [phone]    PLEASE NOTE:  The surgery schedule has many variables which may affect the time of your surgery case.  Family members should be available if your surgery time changes.  Plan to be here the day of your procedure between 4-6 hours.    MEDICATIONS:  TAKE ONLY THESE MEDICATIONS WITH A SMALL SIP OF WATER THE MORNING OF YOUR PROCEDURE:  SEE LIST      DO NOT TAKE THESE MEDICATIONS 5-7 DAYS PRIOR to your procedure or per your surgeon's request:   ASPIRIN, ALEVE, ADVIL, IBUPROFEN, FISH OIL VITAMIN E, HERBALS  (May take Tylenol)    ONLY if you are prescribed any types of blood thinners such as:  Aspirin, Coumadin, Plavix, Pradaxa, Xarelto, Aggrenox, Effient, Eliquis, Savasya, Brilinta, or any other, ask your surgeon whether you should stop taking them and how long before surgery you should stop.  You may also need to verify with the prescribing physician if it is ok to stop your medication.      INSTRUCTIONS IMPORTANT!!  Do not eat or drink anything between midnight and the time of your procedure- this includes gum, mints, and candy.  Do not smoke or drink alcoholic beverages 24 hours prior to your procedure.  Shower the night before AND the morning of your procedure with a Chlorhexidine wash such as Hibiclens or Dial antibacterial soap from the neck down.  Do not get it on your face or in your eyes.  You may use your own shampoo and face wash. This helps your skin to be as bacteria free as possible.    If you wear contact lenses, dentures, hearing aids or glasses, bring a container to put them  in during surgery and give to a family member for safe keeping.  Please leave all jewelry, piercing's and valuables at home. You must remove your false eyelashes prior to surgery.    DO NOT remove hair from the surgery site.  Do not shave the incision site unless you are given specific instructions to do so.    ONLY if you have been diagnosed with sleep apnea please bring your C-PAP machine.  ONLY if you wear home oxygen please bring your portable oxygen tank the day of your procedure.  ONLY if you have a history of OPEN HEART SURGERY you will need a clearance from your Cardiologist per Anesthesia.      ONLY for patients requiring bowel prep, written instructions will be given by your doctor's office.  ONLY if you have a neuro stimulator, please bring the controller with you the morning of surgery  ONLY if a type and screen test is needed before surgery, please return:  If your doctor has scheduled you for an overnight stay, bring a small overnight bag with any personal items you need.  Make arrangements in advance for transportation home by a responsible adult.  It is not safe to drive a vehicle during the 24 hours after anesthesia.          All  facilities and properties are tobacco free.  Smoking is NOT allowed.   If you have any questions about these instructions, call Pre-Op Admit  Nursing at [phone] or the Pre-Op Day Surgery Unit at [phone].

## 2023-11-17 NOTE — TELEPHONE ENCOUNTER
----- Message from Ml Johnson RN sent at 11/17/2023  9:05 AM CST -----  This pt is scheduled for a lumbar laminectomy on 11-29-23. Dr. Gallegos is requesting cardiac clearance. Is pt cleared to proceed with surgery?

## 2023-11-22 ENCOUNTER — OFFICE VISIT (OUTPATIENT)
Dept: CARDIOLOGY | Facility: CLINIC | Age: 78
End: 2023-11-22
Payer: MEDICARE

## 2023-11-22 VITALS
SYSTOLIC BLOOD PRESSURE: 124 MMHG | OXYGEN SATURATION: 94 % | RESPIRATION RATE: 16 BRPM | WEIGHT: 184 LBS | BODY MASS INDEX: 26.34 KG/M2 | HEART RATE: 83 BPM | HEIGHT: 70 IN | DIASTOLIC BLOOD PRESSURE: 80 MMHG

## 2023-11-22 DIAGNOSIS — Z01.818 PREOPERATIVE EVALUATION OF A MEDICAL CONDITION TO RULE OUT SURGICAL CONTRAINDICATIONS (TAR REQUIRED): ICD-10-CM

## 2023-11-22 DIAGNOSIS — I10 BENIGN HYPERTENSION: ICD-10-CM

## 2023-11-22 DIAGNOSIS — I25.10 CORONARY ARTERY DISEASE INVOLVING NATIVE CORONARY ARTERY OF NATIVE HEART WITHOUT ANGINA PECTORIS: Primary | ICD-10-CM

## 2023-11-22 DIAGNOSIS — I48.0 PAROXYSMAL ATRIAL FIBRILLATION: ICD-10-CM

## 2023-11-22 DIAGNOSIS — E78.5 HYPERLIPIDEMIA WITH TARGET LOW DENSITY LIPOPROTEIN (LDL) CHOLESTEROL LESS THAN 70 MG/DL: ICD-10-CM

## 2023-11-22 PROCEDURE — 99214 PR OFFICE/OUTPT VISIT, EST, LEVL IV, 30-39 MIN: ICD-10-PCS | Mod: S$GLB,,, | Performed by: INTERNAL MEDICINE

## 2023-11-22 PROCEDURE — 99214 OFFICE O/P EST MOD 30 MIN: CPT | Mod: S$GLB,,, | Performed by: INTERNAL MEDICINE

## 2023-11-22 NOTE — LETTER
2023    Taco SAM Blas Mathew  [address] [place] [address]             Kat  [facility]  [address], [address]  [place] [address]  Phone: [phone]  Fax: [phone] Patient: Taco Odonnell Jr  : 1945  Referring Doctor: Dr. Gallegos  Procedure: Laminectomy    Current Outpatient Medications   Medication Sig    aspirin (ECOTRIN) 81 MG EC tablet Take 81 mg by mouth.  PATIENT STATED HE TAKES 3 TIMES WEEKLY    coenzyme Q10 200 mg capsule Take 200 mg by mouth once daily.    evolocumab (REPATHA SURECLICK) 140 mg/mL PnIj Inject 1 mL (140 mg total) into the skin every 14 (fourteen) days.    FLAXSEED OIL ORAL Take 1,200 mg by mouth 2 (two) times daily.    hydroCHLOROthiazide (HYDRODIURIL) 12.5 MG Tab Take 1 tablet (12.5 mg total) by mouth every morning.    multivitamin capsule Take 1 capsule by mouth once daily.    oxyCODONE-acetaminophen (PERCOCET)  mg per tablet Take 1 tablet every 4 hours needed for pain rarely    predniSONE (DELTASONE) 2.5 MG tablet Take1 tablet by mouth 2 to 3 times a day    rivaroxaban (XARELTO) 20 mg Tab Take 1 tablet (20 mg total) by mouth once daily.    sotaloL (BETAPACE) 80 MG tablet Take 1 tablet (80 mg total) by mouth 2 (two) times daily.    terazosin (HYTRIN) 5 MG capsule Take 1 capsule (5 mg total) by mouth every evening.    mupirocin (BACTROBAN) 2 % ointment Apply to wounds twice daily till healed, then discontinue    oxyCODONE-acetaminophen (PERCOCET) 5-325 mg per tablet TAKE 1 TABLET EVERY 4 HOURS AS NEEEDED FOR HIP PAIN (Patient not taking: Reported on 2023)    oxyCODONE-acetaminophen (PERCOCET) 5-325 mg per tablet Take 1 tablet by mouth every 4 (four) hours as needed for for lumbar spinal stenosis & sciatica.. (Patient not taking: Reported on 2023)    predniSONE (DELTASONE) 20 MG tablet Take 0.5 to 1 tablet (20 mg total) by mouth every 12 (twelve) hours for sciatica. (Patient not taking: Reported on 2023)     predniSONE (DELTASONE) 5 MG tablet Take 1 tablet twice a day for 5 days (Patient not taking: Reported on 11/7/2023)    predniSONE (DELTASONE) 5 MG tablet Take 1 tablet (5 mg total) by mouth 2 (two) times daily as needed for lumbar spinal stenosis & sciatica. (Patient not taking: Reported on 11/17/2023)     No current facility-administered medications for this visit.       This patient has been assessed for risk factors for clearance of surgery with the following stipulations:    ___ No contraindications  ___ Recommendations for Xarelto,  hold x _3_ days; and hold aspirin for 7 days    ___ Low risk _X_ Moderate risk __ High risk       If you have any questions regarding the above, please contact my office at [phone].    Sincerely,

## 2023-11-22 NOTE — PROGRESS NOTES
Subjective:    Patient ID:  Taco Odonnell Jr is a 78 y.o. male patient here for evaluation Results (Stress results, needs clearance for Laminectomy with Dr. Jhony Aguirre)      History of Present Illness:     Patient is a 78-year-old gentleman who is suffering from low back pain left leg weakness is scheduled to have laminectomy with Dr. Jhony aguirre.  Patient is doing well no new symptoms of angina shortness of breath PND orthopnea noted.  And he was completed a nuclear stress test here to discuss results.  Of note he Dr. Rosales petty primary care had notice some elevated blood pressures however this afternoon in the blood pressure is stable at 124/80 mmHg.  Patient has no new cardiac symptoms.  He feels good overall    Patient has history of paroxysmal atrial fibrillation requiring electrical cardioversion several years ago since then he has been maintaining regular rhythm on sotalol.  Recommend to continue the same however he needs to come off Xarelto in preparation for surgery for at least 3 days before surgery.      Review of patient's allergies indicates:   Allergen Reactions    Penicillin g Other (See Comments)     Showed on allergy test        Past Medical History:   Diagnosis Date    Benign hypertension     Coronary artery disease     Glaucoma     Hyperlipidemia LDL goal < 70     Myocardial infarction      Past Surgical History:   Procedure Laterality Date    CATARACT EXTRACTION      CORONARY ARTERY BYPASS GRAFT  2003    ROTATOR CUFF REPAIR  2010     Social History     Tobacco Use    Smoking status: Former     Current packs/day: 0.00     Average packs/day: 1 pack/day for 40.0 years (40.0 ttl pk-yrs)     Types: Cigarettes     Start date: 1962     Quit date: 2002     Years since quittin.9    Smokeless tobacco: Never   Substance Use Topics    Alcohol use: No    Drug use: No        Review of Systems:    As noted in HPI in addition      REVIEW OF SYSTEMS  CARDIOVASCULAR: No recent  chest pain, palpitations, arm, neck, or jaw pain  RESPIRATORY: No recent fever, cough chills, SOB or congestion  : No blood in the urine  GI: No Nausea, vomiting, constipation, diarrhea, blood, or reflux.  MUSCULOSKELETAL: No myalgias  NEURO: No lightheadedness or dizziness  EYES: No Double vision, blurry, vision or headache              Objective        Vitals:    11/22/23 1332   BP: 124/80   Pulse: 83   Resp: 16       LIPIDS - LAST 2   Lab Results   Component Value Date    CHOL 117 (L) 06/07/2023    CHOL 113 (L) 12/07/2022    HDL 51 06/07/2023    HDL 62 12/07/2022    LDLCALC 37.0 (L) 06/07/2023    LDLCALC 38.8 (L) 12/07/2022    TRIG 145 06/07/2023    TRIG 61 12/07/2022    CHOLHDL 43.6 06/07/2023    CHOLHDL 54.9 (H) 12/07/2022       CBC - LAST 2  Lab Results   Component Value Date    WBC 8.08 11/17/2023    WBC 8.57 10/11/2023    RBC 4.56 (L) 11/17/2023    RBC 4.62 10/11/2023    HGB 14.3 11/17/2023    HGB 15.3 10/11/2023    HCT 42.9 11/17/2023    HCT 42.8 10/11/2023    MCV 94 11/17/2023    MCV 93 10/11/2023    MCH 31.4 (H) 11/17/2023    MCH 33.1 (H) 10/11/2023    MCHC 33.3 11/17/2023    MCHC 35.7 10/11/2023    RDW 11.8 11/17/2023    RDW 11.9 10/11/2023     11/17/2023     10/11/2023    MPV 9.6 11/17/2023    MPV 10.8 10/11/2023    GRAN 5.6 11/17/2023    GRAN 69.9 11/17/2023    LYMPH 1.3 11/17/2023    LYMPH 15.7 (L) 11/17/2023    MONO 0.9 11/17/2023    MONO 10.6 11/17/2023    BASO 0.07 11/17/2023    BASO 0.03 10/11/2023    NRBC 0 11/17/2023    NRBC 0 10/11/2023       CHEMISTRY & LIVER FUNCTION - LAST 2  Lab Results   Component Value Date     11/17/2023     10/11/2023    K 4.5 11/17/2023    K 4.0 10/11/2023     11/17/2023     10/11/2023    CO2 28 11/17/2023    CO2 26 10/11/2023    ANIONGAP 8 11/17/2023    ANIONGAP 9 10/11/2023    BUN 14 11/17/2023    BUN 21 10/11/2023    CREATININE 0.9 11/17/2023    CREATININE 0.8 10/11/2023     11/17/2023     (H) 10/11/2023     CALCIUM 9.7 11/17/2023    CALCIUM 10.0 10/11/2023    MG 2.0 02/24/2020    ALBUMIN 4.6 10/11/2023    ALBUMIN 4.2 06/07/2023    PROT 7.5 10/11/2023    PROT 6.8 06/07/2023    ALKPHOS 46 (L) 10/11/2023    ALKPHOS 49 (L) 06/07/2023    ALT 19 10/11/2023    ALT 25 06/07/2023    AST 22 10/11/2023    AST 22 06/07/2023    BILITOT 1.0 10/11/2023    BILITOT 1.4 (H) 06/07/2023        CARDIAC PROFILE - LAST 2  Lab Results   Component Value Date     (H) 04/26/2021        COAGULATION - LAST 2  Lab Results   Component Value Date    INR 1.0 11/17/2023    APTT 26.5 11/17/2023       ENDOCRINE & PSA - LAST 2  Lab Results   Component Value Date    HGBA1C 5.7 06/07/2023    HGBA1C 5.4 12/07/2022    TSH 1.000 12/07/2022    PSA 1.19 10/11/2023    PSA 1.3 07/05/2022        ECHOCARDIOGRAM RESULTS  Results for orders placed during the hospital encounter of 11/13/23    Echo    Interpretation Summary    Left Ventricle: The left ventricle is normal in size. Normal wall thickness. Normal wall motion. There is normal systolic function with a visually estimated ejection fraction of 65 - 70%. There is normal diastolic function.    Right Ventricle: Normal right ventricular cavity size. Wall thickness is normal. Right ventricle wall motion  is normal. Systolic function is normal.    Left Atrium: Left atrium is mildly dilated.    Right Atrium: Right atrium is mildly dilated.    Aortic Valve: The aortic valve is a trileaflet valve. There is mild aortic valve sclerosis. There is mild aortic regurgitation with a centrally directed jet.    Mitral Valve: There is mild regurgitation with a centrally directed jet.    IVC/SVC: Normal venous pressure at 3 mmHg.      CURRENT/PREVIOUS VISIT EKG  Results for orders placed or performed in visit on 11/07/23   IN OFFICE EKG 12-LEAD (to [place])    Collection Time: 11/07/23  4:05 PM    Narrative    Test Reason : R94.31,I48.0,I10,E78.5,Z78.9,    Vent. Rate : 070 BPM     Atrial Rate : 070 BPM     P-R Int : 142 ms           QRS Dur : 078 ms      QT Int : 380 ms       P-R-T Axes : 074 059 099 degrees     QTc Int : 410 ms    Normal sinus rhythm  Possible Left atrial enlargement  ST and T wave abnormality, consider anterolateral ischemia  Abnormal ECG  When compared with ECG of 06-NOV-2023 11:47,  Criteria for Inferior infarct are no longer Present  Confirmed by Roldan Garza MD ([id number]) on 11/9/2023 8:27:37 PM    Referred By:             Confirmed By:Roldan Garza MD     No valid procedures specified.   Results for orders placed during the hospital encounter of 11/13/23    Nuclear Stress - Cardiology Interpreted    Interpretation Summary    Abnormal myocardial perfusion scan.  There is no evidence of reversible ischemia seen.    There is a moderate to severe intensity, moderate sized, equivocal perfusion abnormality that is fixed in the basal to apical inferior and inferolateral wall(s). This finding is equivocal due to diaphragm shadow.    There are no other significant perfusion abnormalities.    There is a  moderate intensity fixed perfusion abnormality in the inferolateral and inferior wall of the left ventricle secondary to diaphragm attenuation.    The gated perfusion images showed an ejection fraction of 57% at rest. The gated perfusion images showed an ejection fraction of 60% post stress. Normal ejection fraction is greater than 53%.    There is normal wall motion at rest and post stress.    LV cavity size is normal at rest and normal at stress.    The ECG portion of the study is abnormal but not diagnostic due to resting ST-T abnormalities.    The patient reported no chest pain during the stress test.    No valid procedures specified.    PHYSICAL EXAM  CONSTITUTIONAL: Well built, well nourished in no apparent distress  NECK: no carotid bruit, no JVD  LUNGS: CTA  CHEST WALL: no tenderness  HEART: regular rate and rhythm, S1, S2 normal, no murmur, click, rub or gallop   ABDOMEN: soft, non-tender; bowel sounds normal; no  masses,  no organomegaly  EXTREMITIES: Extremities normal, no edema, no calf tenderness noted  NEURO: AAO X 3    I HAVE REVIEWED :    The vital signs, nurses notes, and all the pertinent radiology and labs.        Current Outpatient Medications   Medication Instructions    aspirin (ECOTRIN) 81 mg, Oral,  PATIENT STATED HE TAKES 3 TIMES WEEKLY    coenzyme Q10 200 mg, Oral, Daily,      FLAXSEED OIL ORAL 1,200 mg, Oral, 2 times daily,      hydroCHLOROthiazide (HYDRODIURIL) 12.5 mg, Oral, Every morning    multivitamin capsule 1 capsule, Oral, Daily,      mupirocin (BACTROBAN) 2 % ointment Apply to wounds twice daily till healed, then discontinue    oxyCODONE-acetaminophen (PERCOCET)  mg per tablet Take 1 tablet every 4 hours needed for pain rarely    oxyCODONE-acetaminophen (PERCOCET) 5-325 mg per tablet TAKE 1 TABLET EVERY 4 HOURS AS NEEEDED FOR HIP PAIN    oxyCODONE-acetaminophen (PERCOCET) 5-325 mg per tablet Take 1 tablet by mouth every 4 (four) hours as needed for for lumbar spinal stenosis & sciatica..    predniSONE (DELTASONE) 2.5 MG tablet Take1 tablet by mouth 2 to 3 times a day    predniSONE (DELTASONE) 20 MG tablet Take 0.5 to 1 tablet (20 mg total) by mouth every 12 (twelve) hours for sciatica.    predniSONE (DELTASONE) 5 MG tablet Take 1 tablet twice a day for 5 days    predniSONE (DELTASONE) 5 MG tablet Take 1 tablet (5 mg total) by mouth 2 (two) times daily as needed for lumbar spinal stenosis & sciatica.    REPATHA SURECLICK 140 mg, Subcutaneous, Every 14 days    rivaroxaban (XARELTO) 20 mg, Oral, Daily    sotaloL (BETAPACE) 80 mg, Oral, 2 times daily    terazosin (HYTRIN) 5 mg, Oral, Nightly          Assessment & Plan     Coronary artery disease  Stable coronary artery disease no further episodes of angina nuclear myocardial perfusion study did not show any evidence of reversible ischemia.  Continue on present therapy with risk factor modification including his including Repatha at 140 mg every 14  days continue to maintain low-fat low-cholesterol diet  Patient has severe statin intolerance    Benign hypertension  Blood pressure has been stable at 120 4/80 mm Hg continue low-dose of hydrochlorothiazide at 12.5 mg once daily and he is on terazosin 5 mg at nighttime maintain the same regimen.    Hyperlipidemia LDL goal < 70  His lipid levels are now at goal continue on Repatha to 2 weekly.    Paroxysmal atrial fibrillation  His rhythm has been stable he is to continue on Xarelto 20 mg daily and Betapace 80 mg p.o. b.i.d..    Preoperative evaluation of a medical condition to rule out surgical contraindications (TAR required)  Patient is stable from cardiac standpoint his acceptable cardiovascular risk for planned surgical intervention.  Myocardial perfusion study did not show any evidence of ischemia   Of note recommend to withhold Xarelto 3 days before surgery.  The last dose of Xarelto is November 25th initially be off 26, 27 and the 28th, stop the aspirin for 7 days before surgery          Follow up in about 6 months (around 5/22/2024).

## 2023-11-22 NOTE — ASSESSMENT & PLAN NOTE
Stable coronary artery disease no further episodes of angina nuclear myocardial perfusion study did not show any evidence of reversible ischemia.  Continue on present therapy with risk factor modification including his including Repatha at 140 mg every 14 days continue to maintain low-fat low-cholesterol diet  Patient has severe statin intolerance

## 2023-11-22 NOTE — ASSESSMENT & PLAN NOTE
His rhythm has been stable he is to continue on Xarelto 20 mg daily and Betapace 80 mg p.o. b.i.d..

## 2023-11-22 NOTE — ASSESSMENT & PLAN NOTE
Patient is stable from cardiac standpoint his acceptable cardiovascular risk for planned surgical intervention.  Myocardial perfusion study did not show any evidence of ischemia   Of note recommend to withhold Xarelto 3 days before surgery.  The last dose of Xarelto is November 25th initially be off 26, 27 and the 28th, stop the aspirin for 7 days before surgery

## 2023-11-22 NOTE — ASSESSMENT & PLAN NOTE
Blood pressure has been stable at 120 4/80 mm Hg continue low-dose of hydrochlorothiazide at 12.5 mg once daily and he is on terazosin 5 mg at nighttime maintain the same regimen.

## 2023-11-24 ENCOUNTER — ANESTHESIA EVENT (OUTPATIENT)
Dept: SURGERY | Facility: HOSPITAL | Age: 78
End: 2023-11-24
Payer: MEDICARE

## 2023-11-28 ENCOUNTER — TELEPHONE (OUTPATIENT)
Dept: NEUROSURGERY | Facility: CLINIC | Age: 78
End: 2023-11-28
Payer: MEDICARE

## 2023-11-28 NOTE — TELEPHONE ENCOUNTER
Contacted pt to confirm that he has not taken any blood thinners/anti inflammatories in the past 7 days and does not have open wounds/rashes or new medical problems since evaluated by Dr. Gallegos. Reminded pt to be npo after midnight and to contact our office with any questions or concerns. Pt voiced understanding.

## 2023-11-29 ENCOUNTER — HOSPITAL ENCOUNTER (OUTPATIENT)
Facility: HOSPITAL | Age: 78
Discharge: HOME | End: 2023-11-30
Attending: NEUROLOGICAL SURGERY | Admitting: HOSPITALIST
Payer: MEDICARE

## 2023-11-29 ENCOUNTER — ANESTHESIA (OUTPATIENT)
Dept: SURGERY | Facility: HOSPITAL | Age: 78
End: 2023-11-29
Payer: MEDICARE

## 2023-11-29 DIAGNOSIS — M47.816 LUMBAR SPONDYLOSIS: ICD-10-CM

## 2023-11-29 DIAGNOSIS — M48.061 LUMBAR STENOSIS WITHOUT NEUROGENIC CLAUDICATION: Primary | ICD-10-CM

## 2023-11-29 DIAGNOSIS — Z98.890 STATUS POST LAMINECTOMY: ICD-10-CM

## 2023-11-29 PROCEDURE — 25000003 PHARM REV CODE 250: Performed by: HEALTH CARE PROVIDER

## 2023-11-29 PROCEDURE — 99900035 HC TECH TIME PER 15 MIN (STAT)

## 2023-11-29 PROCEDURE — C1729 CATH, DRAINAGE: HCPCS | Performed by: NEUROLOGICAL SURGERY

## 2023-11-29 PROCEDURE — 94761 N-INVAS EAR/PLS OXIMETRY MLT: CPT

## 2023-11-29 PROCEDURE — 25000003 PHARM REV CODE 250: Performed by: STUDENT IN AN ORGANIZED HEALTH CARE EDUCATION/TRAINING PROGRAM

## 2023-11-29 PROCEDURE — C9290 INJ, BUPIVACAINE LIPOSOME: HCPCS | Performed by: NEUROLOGICAL SURGERY

## 2023-11-29 PROCEDURE — 25000003 PHARM REV CODE 250: Performed by: NURSE ANESTHETIST, CERTIFIED REGISTERED

## 2023-11-29 PROCEDURE — 36000710: Performed by: NEUROLOGICAL SURGERY

## 2023-11-29 PROCEDURE — 63600175 PHARM REV CODE 636 W HCPCS: Performed by: NEUROLOGICAL SURGERY

## 2023-11-29 PROCEDURE — 37000009 HC ANESTHESIA EA ADD 15 MINS: Performed by: NEUROLOGICAL SURGERY

## 2023-11-29 PROCEDURE — 25000003 PHARM REV CODE 250: Performed by: NEUROLOGICAL SURGERY

## 2023-11-29 PROCEDURE — 25000003 PHARM REV CODE 250: Performed by: NURSE PRACTITIONER

## 2023-11-29 PROCEDURE — 94799 UNLISTED PULMONARY SVC/PX: CPT | Mod: XB

## 2023-11-29 PROCEDURE — 63267 EXCISE INTRSPINL LESION LMBR: CPT | Mod: ,,, | Performed by: NEUROLOGICAL SURGERY

## 2023-11-29 PROCEDURE — 63600175 PHARM REV CODE 636 W HCPCS: Performed by: STUDENT IN AN ORGANIZED HEALTH CARE EDUCATION/TRAINING PROGRAM

## 2023-11-29 PROCEDURE — 71000039 HC RECOVERY, EACH ADD'L HOUR: Performed by: NEUROLOGICAL SURGERY

## 2023-11-29 PROCEDURE — 63267 PR EXCIS INTRASP LESN,XDURAL,LUMBAR: ICD-10-PCS | Mod: ,,, | Performed by: NEUROLOGICAL SURGERY

## 2023-11-29 PROCEDURE — 37000008 HC ANESTHESIA 1ST 15 MINUTES: Performed by: NEUROLOGICAL SURGERY

## 2023-11-29 PROCEDURE — 88304 TISSUE EXAM BY PATHOLOGIST: CPT | Mod: TC | Performed by: PATHOLOGY

## 2023-11-29 PROCEDURE — D9220A PRA ANESTHESIA: Mod: CRNA,,, | Performed by: NURSE ANESTHETIST, CERTIFIED REGISTERED

## 2023-11-29 PROCEDURE — 27201423 OPTIME MED/SURG SUP & DEVICES STERILE SUPPLY: Performed by: NEUROLOGICAL SURGERY

## 2023-11-29 PROCEDURE — 69990 PR MICROSURG TECHNIQUES,REQ OPER MICROSCOPE: ICD-10-PCS | Mod: ,,, | Performed by: NEUROLOGICAL SURGERY

## 2023-11-29 PROCEDURE — D9220A PRA ANESTHESIA: Mod: ANES,,, | Performed by: ANESTHESIOLOGY

## 2023-11-29 PROCEDURE — 69990 MICROSURGERY ADD-ON: CPT | Mod: ,,, | Performed by: NEUROLOGICAL SURGERY

## 2023-11-29 PROCEDURE — D9220A PRA ANESTHESIA: ICD-10-PCS | Mod: CRNA,,, | Performed by: NURSE ANESTHETIST, CERTIFIED REGISTERED

## 2023-11-29 PROCEDURE — 36000711: Performed by: NEUROLOGICAL SURGERY

## 2023-11-29 PROCEDURE — 63600175 PHARM REV CODE 636 W HCPCS: Performed by: HEALTH CARE PROVIDER

## 2023-11-29 PROCEDURE — 25000003 PHARM REV CODE 250: Performed by: ANESTHESIOLOGY

## 2023-11-29 PROCEDURE — 71000033 HC RECOVERY, INTIAL HOUR: Performed by: NEUROLOGICAL SURGERY

## 2023-11-29 PROCEDURE — D9220A PRA ANESTHESIA: ICD-10-PCS | Mod: ANES,,, | Performed by: ANESTHESIOLOGY

## 2023-11-29 RX ORDER — VANCOMYCIN HYDROCHLORIDE 1 G/20ML
INJECTION, POWDER, LYOPHILIZED, FOR SOLUTION INTRAVENOUS CODE/TRAUMA/SEDATION MEDICATION
Status: DISCONTINUED | OUTPATIENT
Start: 2023-11-29 | End: 2023-11-29 | Stop reason: HOSPADM

## 2023-11-29 RX ORDER — ONDANSETRON 2 MG/ML
4 INJECTION INTRAMUSCULAR; INTRAVENOUS ONCE AS NEEDED
Status: DISCONTINUED | OUTPATIENT
Start: 2023-11-29 | End: 2023-11-29 | Stop reason: HOSPADM

## 2023-11-29 RX ORDER — OXYCODONE HYDROCHLORIDE 5 MG/1
5 TABLET ORAL EVERY 4 HOURS PRN
Status: DISCONTINUED | OUTPATIENT
Start: 2023-11-29 | End: 2023-11-30 | Stop reason: HOSPADM

## 2023-11-29 RX ORDER — OXYCODONE HYDROCHLORIDE 5 MG/1
5 TABLET ORAL
Status: DISCONTINUED | OUTPATIENT
Start: 2023-11-29 | End: 2023-11-29 | Stop reason: HOSPADM

## 2023-11-29 RX ORDER — PRAZOSIN HYDROCHLORIDE 1 MG/1
2 CAPSULE ORAL 2 TIMES DAILY
Status: DISCONTINUED | OUTPATIENT
Start: 2023-11-29 | End: 2023-11-30 | Stop reason: HOSPADM

## 2023-11-29 RX ORDER — ONDANSETRON 2 MG/ML
INJECTION INTRAMUSCULAR; INTRAVENOUS
Status: DISCONTINUED | OUTPATIENT
Start: 2023-11-29 | End: 2023-11-29

## 2023-11-29 RX ORDER — LIDOCAINE HYDROCHLORIDE 20 MG/ML
INJECTION INTRAVENOUS
Status: DISCONTINUED | OUTPATIENT
Start: 2023-11-29 | End: 2023-11-29

## 2023-11-29 RX ORDER — AMOXICILLIN 250 MG
2 CAPSULE ORAL NIGHTLY PRN
Status: DISCONTINUED | OUTPATIENT
Start: 2023-11-29 | End: 2023-11-30 | Stop reason: HOSPADM

## 2023-11-29 RX ORDER — PROPOFOL 10 MG/ML
VIAL (ML) INTRAVENOUS CONTINUOUS PRN
Status: DISCONTINUED | OUTPATIENT
Start: 2023-11-29 | End: 2023-11-29

## 2023-11-29 RX ORDER — SUCCINYLCHOLINE CHLORIDE 20 MG/ML
INJECTION INTRAMUSCULAR; INTRAVENOUS
Status: DISCONTINUED | OUTPATIENT
Start: 2023-11-29 | End: 2023-11-29

## 2023-11-29 RX ORDER — MAG HYDROX/ALUMINUM HYD/SIMETH 200-200-20
30 SUSPENSION, ORAL (FINAL DOSE FORM) ORAL EVERY 4 HOURS PRN
Status: DISCONTINUED | OUTPATIENT
Start: 2023-11-29 | End: 2023-11-30 | Stop reason: HOSPADM

## 2023-11-29 RX ORDER — MUPIROCIN 20 MG/G
OINTMENT TOPICAL 2 TIMES DAILY
Status: DISCONTINUED | OUTPATIENT
Start: 2023-11-29 | End: 2023-11-30 | Stop reason: HOSPADM

## 2023-11-29 RX ORDER — FENTANYL CITRATE 50 UG/ML
INJECTION, SOLUTION INTRAMUSCULAR; INTRAVENOUS
Status: DISCONTINUED | OUTPATIENT
Start: 2023-11-29 | End: 2023-11-29

## 2023-11-29 RX ORDER — SODIUM CHLORIDE, SODIUM LACTATE, POTASSIUM CHLORIDE, CALCIUM CHLORIDE 600; 310; 30; 20 MG/100ML; MG/100ML; MG/100ML; MG/100ML
INJECTION, SOLUTION INTRAVENOUS CONTINUOUS
Status: DISCONTINUED | OUTPATIENT
Start: 2023-11-29 | End: 2023-11-30 | Stop reason: HOSPADM

## 2023-11-29 RX ORDER — ONDANSETRON 4 MG/1
8 TABLET, ORALLY DISINTEGRATING ORAL EVERY 6 HOURS PRN
Status: DISCONTINUED | OUTPATIENT
Start: 2023-11-29 | End: 2023-11-30 | Stop reason: HOSPADM

## 2023-11-29 RX ORDER — DIPHENHYDRAMINE HYDROCHLORIDE 50 MG/ML
12.5 INJECTION INTRAMUSCULAR; INTRAVENOUS EVERY 6 HOURS PRN
Status: DISCONTINUED | OUTPATIENT
Start: 2023-11-29 | End: 2023-11-29 | Stop reason: HOSPADM

## 2023-11-29 RX ORDER — EPHEDRINE SULFATE 50 MG/ML
INJECTION, SOLUTION INTRAVENOUS
Status: DISCONTINUED | OUTPATIENT
Start: 2023-11-29 | End: 2023-11-29

## 2023-11-29 RX ORDER — MUPIROCIN 20 MG/G
OINTMENT TOPICAL
Status: DISCONTINUED | OUTPATIENT
Start: 2023-11-29 | End: 2023-11-29 | Stop reason: HOSPADM

## 2023-11-29 RX ORDER — BISACODYL 10 MG
10 SUPPOSITORY, RECTAL RECTAL DAILY
Status: DISCONTINUED | OUTPATIENT
Start: 2023-11-30 | End: 2023-11-30 | Stop reason: HOSPADM

## 2023-11-29 RX ORDER — ROCURONIUM BROMIDE 10 MG/ML
INJECTION, SOLUTION INTRAVENOUS
Status: DISCONTINUED | OUTPATIENT
Start: 2023-11-29 | End: 2023-11-29

## 2023-11-29 RX ORDER — PROCHLORPERAZINE EDISYLATE 5 MG/ML
5 INJECTION INTRAMUSCULAR; INTRAVENOUS EVERY 6 HOURS PRN
Status: DISCONTINUED | OUTPATIENT
Start: 2023-11-29 | End: 2023-11-30 | Stop reason: HOSPADM

## 2023-11-29 RX ORDER — HYDROMORPHONE HYDROCHLORIDE 2 MG/ML
0.2 INJECTION, SOLUTION INTRAMUSCULAR; INTRAVENOUS; SUBCUTANEOUS EVERY 5 MIN PRN
Status: DISCONTINUED | OUTPATIENT
Start: 2023-11-29 | End: 2023-11-29 | Stop reason: HOSPADM

## 2023-11-29 RX ORDER — MEPERIDINE HYDROCHLORIDE 50 MG/ML
12.5 INJECTION INTRAMUSCULAR; INTRAVENOUS; SUBCUTANEOUS ONCE AS NEEDED
Status: DISCONTINUED | OUTPATIENT
Start: 2023-11-29 | End: 2023-11-29 | Stop reason: HOSPADM

## 2023-11-29 RX ORDER — HYDROMORPHONE HYDROCHLORIDE 2 MG/ML
2 INJECTION, SOLUTION INTRAMUSCULAR; INTRAVENOUS; SUBCUTANEOUS
Status: DISCONTINUED | OUTPATIENT
Start: 2023-11-29 | End: 2023-11-30 | Stop reason: HOSPADM

## 2023-11-29 RX ORDER — DEXAMETHASONE SODIUM PHOSPHATE 4 MG/ML
INJECTION, SOLUTION INTRA-ARTICULAR; INTRALESIONAL; INTRAMUSCULAR; INTRAVENOUS; SOFT TISSUE
Status: DISCONTINUED | OUTPATIENT
Start: 2023-11-29 | End: 2023-11-29

## 2023-11-29 RX ORDER — ACETAMINOPHEN 10 MG/ML
INJECTION, SOLUTION INTRAVENOUS
Status: DISCONTINUED | OUTPATIENT
Start: 2023-11-29 | End: 2023-11-29

## 2023-11-29 RX ORDER — PROPOFOL 10 MG/ML
VIAL (ML) INTRAVENOUS
Status: DISCONTINUED | OUTPATIENT
Start: 2023-11-29 | End: 2023-11-29

## 2023-11-29 RX ORDER — OXYCODONE AND ACETAMINOPHEN 10; 325 MG/1; MG/1
1 TABLET ORAL EVERY 6 HOURS
Status: DISCONTINUED | OUTPATIENT
Start: 2023-11-30 | End: 2023-11-30 | Stop reason: HOSPADM

## 2023-11-29 RX ORDER — LIDOCAINE HYDROCHLORIDE 10 MG/ML
1 INJECTION, SOLUTION EPIDURAL; INFILTRATION; INTRACAUDAL; PERINEURAL ONCE
Status: DISCONTINUED | OUTPATIENT
Start: 2023-11-29 | End: 2023-11-29 | Stop reason: HOSPADM

## 2023-11-29 RX ORDER — FENTANYL CITRATE 50 UG/ML
25 INJECTION, SOLUTION INTRAMUSCULAR; INTRAVENOUS EVERY 5 MIN PRN
Status: DISCONTINUED | OUTPATIENT
Start: 2023-11-29 | End: 2023-11-29 | Stop reason: HOSPADM

## 2023-11-29 RX ORDER — SOTALOL HYDROCHLORIDE 80 MG/1
80 TABLET ORAL 2 TIMES DAILY
Status: DISCONTINUED | OUTPATIENT
Start: 2023-11-29 | End: 2023-11-30 | Stop reason: HOSPADM

## 2023-11-29 RX ORDER — MUPIROCIN 20 MG/G
1 OINTMENT TOPICAL 2 TIMES DAILY
Status: DISCONTINUED | OUTPATIENT
Start: 2023-11-29 | End: 2023-11-29 | Stop reason: HOSPADM

## 2023-11-29 RX ORDER — BUPIVACAINE HYDROCHLORIDE 5 MG/ML
INJECTION, SOLUTION EPIDURAL; INTRACAUDAL CODE/TRAUMA/SEDATION MEDICATION
Status: DISCONTINUED | OUTPATIENT
Start: 2023-11-29 | End: 2023-11-29 | Stop reason: HOSPADM

## 2023-11-29 RX ORDER — MIDAZOLAM HYDROCHLORIDE 1 MG/ML
INJECTION INTRAMUSCULAR; INTRAVENOUS
Status: DISCONTINUED | OUTPATIENT
Start: 2023-11-29 | End: 2023-11-29

## 2023-11-29 RX ADMIN — FENTANYL CITRATE 50 MCG: 50 INJECTION, SOLUTION INTRAMUSCULAR; INTRAVENOUS at 05:11

## 2023-11-29 RX ADMIN — SOTALOL HYDROCHLORIDE 80 MG: 80 TABLET ORAL at 09:11

## 2023-11-29 RX ADMIN — ACETAMINOPHEN 1000 MG: 10 INJECTION, SOLUTION INTRAVENOUS at 03:11

## 2023-11-29 RX ADMIN — PROPOFOL 100 MCG/KG/MIN: 10 INJECTION, EMULSION INTRAVENOUS at 03:11

## 2023-11-29 RX ADMIN — SODIUM CHLORIDE, SODIUM GLUCONATE, SODIUM ACETATE, POTASSIUM CHLORIDE AND MAGNESIUM CHLORIDE: 526; 502; 368; 37; 30 INJECTION, SOLUTION INTRAVENOUS at 01:11

## 2023-11-29 RX ADMIN — MUPIROCIN: 20 OINTMENT TOPICAL at 09:11

## 2023-11-29 RX ADMIN — LIDOCAINE HYDROCHLORIDE 75 MG: 20 INJECTION, SOLUTION INTRAVENOUS at 03:11

## 2023-11-29 RX ADMIN — ONDANSETRON 8 MG: 4 TABLET, ORALLY DISINTEGRATING ORAL at 10:11

## 2023-11-29 RX ADMIN — PROPOFOL 100 MG: 10 INJECTION, EMULSION INTRAVENOUS at 03:11

## 2023-11-29 RX ADMIN — ONDANSETRON 4 MG: 2 INJECTION INTRAMUSCULAR; INTRAVENOUS at 03:11

## 2023-11-29 RX ADMIN — MUPIROCIN: 20 OINTMENT TOPICAL at 01:11

## 2023-11-29 RX ADMIN — EPHEDRINE SULFATE 10 MG: 50 INJECTION, SOLUTION INTRAMUSCULAR; INTRAVENOUS; SUBCUTANEOUS at 04:11

## 2023-11-29 RX ADMIN — ROCURONIUM BROMIDE 10 MG: 10 INJECTION, SOLUTION INTRAVENOUS at 03:11

## 2023-11-29 RX ADMIN — FENTANYL CITRATE 50 MCG: 50 INJECTION, SOLUTION INTRAMUSCULAR; INTRAVENOUS at 03:11

## 2023-11-29 RX ADMIN — EPHEDRINE SULFATE 10 MG: 50 INJECTION, SOLUTION INTRAMUSCULAR; INTRAVENOUS; SUBCUTANEOUS at 03:11

## 2023-11-29 RX ADMIN — DEXAMETHASONE SODIUM PHOSPHATE 8 MG: 4 INJECTION, SOLUTION INTRA-ARTICULAR; INTRALESIONAL; INTRAMUSCULAR; INTRAVENOUS; SOFT TISSUE at 03:11

## 2023-11-29 RX ADMIN — ONDANSETRON 4 MG: 2 INJECTION INTRAMUSCULAR; INTRAVENOUS at 05:11

## 2023-11-29 RX ADMIN — METHOCARBAMOL 1000 MG: 100 INJECTION INTRAMUSCULAR; INTRAVENOUS at 06:11

## 2023-11-29 RX ADMIN — MIDAZOLAM HYDROCHLORIDE 1 MG: 1 INJECTION INTRAMUSCULAR; INTRAVENOUS at 03:11

## 2023-11-29 RX ADMIN — OXYCODONE 5 MG: 5 TABLET ORAL at 10:11

## 2023-11-29 RX ADMIN — OXYCODONE HYDROCHLORIDE 5 MG: 5 TABLET ORAL at 06:11

## 2023-11-29 RX ADMIN — VANCOMYCIN HYDROCHLORIDE 1000 MG: 1 INJECTION, POWDER, LYOPHILIZED, FOR SOLUTION INTRAVENOUS at 01:11

## 2023-11-29 RX ADMIN — SUCCINYLCHOLINE CHLORIDE 120 MG: 20 INJECTION, SOLUTION INTRAMUSCULAR; INTRAVENOUS at 03:11

## 2023-11-29 RX ADMIN — PRAZOSIN HYDROCHLORIDE 2 MG: 1 CAPSULE ORAL at 09:11

## 2023-11-29 NOTE — ANESTHESIA PREPROCEDURE EVALUATION
11/29/2023  Taco Odonnell Jr is a 78 y.o., male.      Pre-op Assessment    I have reviewed the Patient Summary Reports.     I have reviewed the Nursing Notes. I have reviewed the NPO Status.   I have reviewed the Medications.     Review of Systems  Anesthesia Hx:             Denies Family Hx of Anesthesia complications.    Denies Personal Hx of Anesthesia complications.                    Cardiovascular:     Hypertension  Past MI CAD   CABG/stent           ECG has been reviewed.                              Physical Exam  General: Well nourished, Cooperative, Alert and Oriented    Airway:  Mallampati: III / II  Mouth Opening: Normal  TM Distance: Normal  Tongue: Normal  Neck ROM: Normal ROM    Chest/Lungs:  Normal Respiratory Rate    Heart:  Rate: Normal  Rhythm: Regular Rhythm        Anesthesia Plan  Type of Anesthesia, risks & benefits discussed:    Anesthesia Type: Gen ETT  Intra-op Monitoring Plan: Standard ASA Monitors  Post Op Pain Control Plan: multimodal analgesia  Induction:  IV  Airway Plan: Video, Post-Induction  ASA Score: 3    Ready For Surgery From Anesthesia Perspective.     .

## 2023-11-29 NOTE — PLAN OF CARE
Patient prepared for surgery. Surgical and anesthesia consents signed. Patient belongings in preop. Text message notifications set up with sister. Call light within reach, bed in lowest position.

## 2023-11-30 ENCOUNTER — PATIENT MESSAGE (OUTPATIENT)
Dept: NEUROSURGERY | Facility: CLINIC | Age: 78
End: 2023-11-30
Payer: MEDICARE

## 2023-11-30 PROBLEM — Z98.890 STATUS POST LAMINECTOMY: Status: ACTIVE | Noted: 2023-11-30

## 2023-11-30 LAB
ANION GAP SERPL CALC-SCNC: 10 MMOL/L (ref 8–16)
BASOPHILS # BLD AUTO: 0.02 K/UL (ref 0–0.2)
BASOPHILS NFR BLD: 0.2 % (ref 0–1.9)
BUN SERPL-MCNC: 8 MG/DL (ref 8–23)
CALCIUM SERPL-MCNC: 8.6 MG/DL (ref 8.7–10.5)
CHLORIDE SERPL-SCNC: 106 MMOL/L (ref 95–110)
CO2 SERPL-SCNC: 23 MMOL/L (ref 23–29)
CREAT SERPL-MCNC: 0.7 MG/DL (ref 0.5–1.4)
DIFFERENTIAL METHOD: ABNORMAL
EOSINOPHIL # BLD AUTO: 0 K/UL (ref 0–0.5)
EOSINOPHIL NFR BLD: 0 % (ref 0–8)
ERYTHROCYTE [DISTWIDTH] IN BLOOD BY AUTOMATED COUNT: 12 % (ref 11.5–14.5)
EST. GFR  (NO RACE VARIABLE): >60 ML/MIN/1.73 M^2
GLUCOSE SERPL-MCNC: 132 MG/DL (ref 70–110)
HCT VFR BLD AUTO: 36.3 % (ref 40–54)
HGB BLD-MCNC: 12.6 G/DL (ref 14–18)
IMM GRANULOCYTES # BLD AUTO: 0.03 K/UL (ref 0–0.04)
IMM GRANULOCYTES NFR BLD AUTO: 0.3 % (ref 0–0.5)
LYMPHOCYTES # BLD AUTO: 0.8 K/UL (ref 1–4.8)
LYMPHOCYTES NFR BLD: 7.9 % (ref 18–48)
MCH RBC QN AUTO: 32.8 PG (ref 27–31)
MCHC RBC AUTO-ENTMCNC: 34.7 G/DL (ref 32–36)
MCV RBC AUTO: 95 FL (ref 82–98)
MONOCYTES # BLD AUTO: 0.4 K/UL (ref 0.3–1)
MONOCYTES NFR BLD: 3.8 % (ref 4–15)
NEUTROPHILS # BLD AUTO: 8.8 K/UL (ref 1.8–7.7)
NEUTROPHILS NFR BLD: 87.8 % (ref 38–73)
NRBC BLD-RTO: 0 /100 WBC
PLATELET # BLD AUTO: 208 K/UL (ref 150–450)
PMV BLD AUTO: 10.2 FL (ref 9.2–12.9)
POTASSIUM SERPL-SCNC: 3.8 MMOL/L (ref 3.5–5.1)
RBC # BLD AUTO: 3.84 M/UL (ref 4.6–6.2)
SODIUM SERPL-SCNC: 139 MMOL/L (ref 136–145)
WBC # BLD AUTO: 10 K/UL (ref 3.9–12.7)

## 2023-11-30 PROCEDURE — 25000003 PHARM REV CODE 250: Performed by: NURSE PRACTITIONER

## 2023-11-30 PROCEDURE — 80048 BASIC METABOLIC PNL TOTAL CA: CPT | Performed by: HEALTH CARE PROVIDER

## 2023-11-30 PROCEDURE — 97165 OT EVAL LOW COMPLEX 30 MIN: CPT

## 2023-11-30 PROCEDURE — 97161 PT EVAL LOW COMPLEX 20 MIN: CPT

## 2023-11-30 PROCEDURE — 97116 GAIT TRAINING THERAPY: CPT

## 2023-11-30 PROCEDURE — 97535 SELF CARE MNGMENT TRAINING: CPT

## 2023-11-30 PROCEDURE — 94799 UNLISTED PULMONARY SVC/PX: CPT | Mod: XB

## 2023-11-30 PROCEDURE — 25000003 PHARM REV CODE 250: Performed by: HEALTH CARE PROVIDER

## 2023-11-30 PROCEDURE — 63600175 PHARM REV CODE 636 W HCPCS: Performed by: HEALTH CARE PROVIDER

## 2023-11-30 PROCEDURE — 36415 COLL VENOUS BLD VENIPUNCTURE: CPT | Performed by: HEALTH CARE PROVIDER

## 2023-11-30 PROCEDURE — 85025 COMPLETE CBC W/AUTO DIFF WBC: CPT | Performed by: HEALTH CARE PROVIDER

## 2023-11-30 PROCEDURE — 94761 N-INVAS EAR/PLS OXIMETRY MLT: CPT

## 2023-11-30 RX ORDER — METHOCARBAMOL 750 MG/1
750 TABLET, FILM COATED ORAL 4 TIMES DAILY
Qty: 84 TABLET | Refills: 0 | Status: SHIPPED | OUTPATIENT
Start: 2023-11-30 | End: 2023-12-21

## 2023-11-30 RX ORDER — SULFAMETHOXAZOLE AND TRIMETHOPRIM 800; 160 MG/1; MG/1
1 TABLET ORAL 2 TIMES DAILY
Qty: 14 TABLET | Refills: 0 | Status: SHIPPED | OUTPATIENT
Start: 2023-11-30 | End: 2023-11-30 | Stop reason: HOSPADM

## 2023-11-30 RX ORDER — SODIUM,POTASSIUM PHOSPHATES 280-250MG
2 POWDER IN PACKET (EA) ORAL
Status: DISCONTINUED | OUTPATIENT
Start: 2023-11-30 | End: 2023-11-30 | Stop reason: HOSPADM

## 2023-11-30 RX ORDER — LANOLIN ALCOHOL/MO/W.PET/CERES
800 CREAM (GRAM) TOPICAL
Status: DISCONTINUED | OUTPATIENT
Start: 2023-11-30 | End: 2023-11-30 | Stop reason: HOSPADM

## 2023-11-30 RX ORDER — OXYCODONE AND ACETAMINOPHEN 10; 325 MG/1; MG/1
1 TABLET ORAL EVERY 6 HOURS PRN
Qty: 28 TABLET | Refills: 0 | Status: SHIPPED | OUTPATIENT
Start: 2023-11-30 | End: 2023-11-30 | Stop reason: HOSPADM

## 2023-11-30 RX ORDER — METHOCARBAMOL 750 MG/1
750 TABLET, FILM COATED ORAL 3 TIMES DAILY
Qty: 63 TABLET | Refills: 0 | Status: SHIPPED | OUTPATIENT
Start: 2023-11-30 | End: 2023-11-30 | Stop reason: HOSPADM

## 2023-11-30 RX ORDER — ACETAMINOPHEN 325 MG/1
650 TABLET ORAL EVERY 6 HOURS PRN
Status: DISCONTINUED | OUTPATIENT
Start: 2023-11-30 | End: 2023-11-30 | Stop reason: HOSPADM

## 2023-11-30 RX ORDER — SULFAMETHOXAZOLE AND TRIMETHOPRIM 800; 160 MG/1; MG/1
1 TABLET ORAL 2 TIMES DAILY
Qty: 14 TABLET | Refills: 0 | Status: SHIPPED | OUTPATIENT
Start: 2023-11-30 | End: 2023-12-07

## 2023-11-30 RX ORDER — AMOXICILLIN 250 MG
2 CAPSULE ORAL NIGHTLY PRN
Qty: 20 TABLET | Refills: 0 | Status: SHIPPED | OUTPATIENT
Start: 2023-11-30 | End: 2023-12-10

## 2023-11-30 RX ORDER — OXYCODONE AND ACETAMINOPHEN 10; 325 MG/1; MG/1
1 TABLET ORAL EVERY 6 HOURS PRN
Qty: 28 TABLET | Refills: 0 | Status: SHIPPED | OUTPATIENT
Start: 2023-11-30 | End: 2023-12-07

## 2023-11-30 RX ORDER — SENNOSIDES 8.6 MG/1
1 TABLET ORAL DAILY
Qty: 14 TABLET | Refills: 0 | Status: SHIPPED | OUTPATIENT
Start: 2023-11-30 | End: 2023-11-30 | Stop reason: HOSPADM

## 2023-11-30 RX ADMIN — SOTALOL HYDROCHLORIDE 80 MG: 80 TABLET ORAL at 09:11

## 2023-11-30 RX ADMIN — MUPIROCIN: 20 OINTMENT TOPICAL at 09:11

## 2023-11-30 RX ADMIN — OXYCODONE AND ACETAMINOPHEN 1 TABLET: 10; 325 TABLET ORAL at 12:11

## 2023-11-30 RX ADMIN — PRAZOSIN HYDROCHLORIDE 2 MG: 1 CAPSULE ORAL at 09:11

## 2023-11-30 RX ADMIN — ACETAMINOPHEN 650 MG: 325 TABLET ORAL at 05:11

## 2023-11-30 RX ADMIN — METHOCARBAMOL 1000 MG: 100 INJECTION INTRAMUSCULAR; INTRAVENOUS at 12:11

## 2023-11-30 RX ADMIN — POTASSIUM BICARBONATE 50 MEQ: 977.5 TABLET, EFFERVESCENT ORAL at 06:11

## 2023-11-30 NOTE — ASSESSMENT & PLAN NOTE
Chronic, controlled. Latest blood pressure and vitals reviewed-     Temp:  [97.2 °F (36.2 °C)-98.1 °F (36.7 °C)]   Pulse:  [66-78]   Resp:  [12-20]   BP: ()/(53-78)   SpO2:  [94 %-100 %] .   Home meds for hypertension were reviewed and noted below.   Hypertension Medications               hydroCHLOROthiazide (HYDRODIURIL) 12.5 MG Tab Take 1 tablet (12.5 mg total) by mouth every morning.    sotaloL (BETAPACE) 80 MG tablet Take 1 tablet (80 mg total) by mouth 2 (two) times daily.    terazosin (HYTRIN) 5 MG capsule Take 1 capsule (5 mg total) by mouth every evening.            While in the hospital, will manage blood pressure as follows; Continue home antihypertensive regimen    Will utilize p.r.n. blood pressure medication only if patient's blood pressure greater than 180/110 and he develops symptoms such as worsening chest pain or shortness of breath.

## 2023-11-30 NOTE — SUBJECTIVE & OBJECTIVE
Past Medical History:   Diagnosis Date    Benign hypertension     Coronary artery disease     Glaucoma     Hyperlipidemia LDL goal < 70     Myocardial infarction 2001    Paroxysmal atrial fibrillation        Past Surgical History:   Procedure Laterality Date    CATARACT EXTRACTION      CORONARY ARTERY BYPASS GRAFT  01/01/2003    ROTATOR CUFF REPAIR  01/01/2010       Review of patient's allergies indicates:   Allergen Reactions    Penicillin g Other (See Comments)     Showed on allergy test        No current facility-administered medications on file prior to encounter.     Current Outpatient Medications on File Prior to Encounter   Medication Sig    hydroCHLOROthiazide (HYDRODIURIL) 12.5 MG Tab Take 1 tablet (12.5 mg total) by mouth every morning.    sotaloL (BETAPACE) 80 MG tablet Take 1 tablet (80 mg total) by mouth 2 (two) times daily.    terazosin (HYTRIN) 5 MG capsule Take 1 capsule (5 mg total) by mouth every evening.    [DISCONTINUED] evolocumab (REPATHA SURECLICK) 140 mg/mL PnIj Inject 1 mL (140 mg total) into the skin every 14 (fourteen) days.    [DISCONTINUED] aspirin (ECOTRIN) 81 MG EC tablet Take 81 mg by mouth.  PATIENT STATED HE TAKES 3 TIMES WEEKLY    [DISCONTINUED] coenzyme Q10 200 mg capsule Take 200 mg by mouth once daily.    [DISCONTINUED] FLAXSEED OIL ORAL Take 1,200 mg by mouth 2 (two) times daily.    [DISCONTINUED] multivitamin capsule Take 1 capsule by mouth once daily.    [DISCONTINUED] oxyCODONE-acetaminophen (PERCOCET) 5-325 mg per tablet TAKE 1 TABLET EVERY 4 HOURS AS NEEEDED FOR HIP PAIN (Patient not taking: Reported on 11/22/2023)    [DISCONTINUED] oxyCODONE-acetaminophen (PERCOCET) 5-325 mg per tablet Take 1 tablet by mouth every 4 (four) hours as needed for for lumbar spinal stenosis & sciatica.. (Patient not taking: Reported on 11/9/2023)    [DISCONTINUED] predniSONE (DELTASONE) 20 MG tablet Take 0.5 to 1 tablet (20 mg total) by mouth every 12 (twelve) hours for sciatica. (Patient not  taking: Reported on 2023)    [DISCONTINUED] predniSONE (DELTASONE) 5 MG tablet Take 1 tablet twice a day for 5 days (Patient not taking: Reported on 2023)    [DISCONTINUED] predniSONE (DELTASONE) 5 MG tablet Take 1 tablet (5 mg total) by mouth 2 (two) times daily as needed for lumbar spinal stenosis & sciatica. (Patient not taking: Reported on 2023)    [DISCONTINUED] rivaroxaban (XARELTO) 20 mg Tab Take 1 tablet (20 mg total) by mouth once daily.     Family History       Problem Relation (Age of Onset)    Cancer Father    Dementia Father    Heart attack Mother, Sister    Hyperlipidemia Mother, Sister          Tobacco Use    Smoking status: Former     Current packs/day: 0.00     Average packs/day: 1 pack/day for 40.0 years (40.0 ttl pk-yrs)     Types: Cigarettes     Start date: 1962     Quit date: 2002     Years since quittin.9    Smokeless tobacco: Never   Substance and Sexual Activity    Alcohol use: No    Drug use: No    Sexual activity: Not on file     Review of Systems   Musculoskeletal:  Positive for back pain and gait problem.     Objective:     Vital Signs (Most Recent):  Temp: 97.6 °F (36.4 °C) (23)  Pulse: 75 (23)  Resp: 18 (23)  BP: 116/77 (23)  SpO2: (!) 94 % (23) Vital Signs (24h Range):  Temp:  [97.2 °F (36.2 °C)-98.1 °F (36.7 °C)] 97.6 °F (36.4 °C)  Pulse:  [66-78] 75  Resp:  [12-20] 18  SpO2:  [94 %-100 %] 94 %  BP: ()/(53-78) 116/77     Weight: 83.5 kg (184 lb)  Body mass index is 26.4 kg/m².     Physical Exam  Constitutional:       General: He is not in acute distress.     Appearance: He is well-developed. He is not ill-appearing.   HENT:      Head: Normocephalic and atraumatic.   Eyes:      Conjunctiva/sclera: Conjunctivae normal.      Pupils: Pupils are equal, round, and reactive to light.   Cardiovascular:      Rate and Rhythm: Normal rate and regular rhythm.      Pulses: Normal pulses.      Heart sounds:  Normal heart sounds.   Pulmonary:      Effort: Pulmonary effort is normal.      Breath sounds: Normal breath sounds.   Abdominal:      General: Bowel sounds are normal.      Palpations: Abdomen is soft.      Tenderness: There is no abdominal tenderness.   Musculoskeletal:         General: Normal range of motion.      Cervical back: Normal range of motion and neck supple.   Skin:     General: Skin is warm and dry.      Comments: MILTON drain with sanguinous drainage.   Neurological:      General: No focal deficit present.      Mental Status: He is alert and oriented to person, place, and time.   Psychiatric:         Mood and Affect: Mood normal.         Behavior: Behavior normal.          Significant Labs: All pertinent labs within the past 24 hours have been reviewed.    Significant Imaging: I have reviewed all pertinent imaging results/findings within the past 24 hours.

## 2023-11-30 NOTE — PLAN OF CARE
Pt clear for DC from case management standpoint. Discharging to home with [facility] (SOC 12/1)       11/30/23 1215   Final Note   Assessment Type Final Discharge Note   Anticipated Discharge Disposition Home-Health   Post-Acute Status   Post-Acute Authorization Home Health   Home Health Status Set-up Complete/Auth obtained

## 2023-11-30 NOTE — ASSESSMENT & PLAN NOTE
Patient with known CAD s/p CABG, which is controlled Will continue ASA and monitor for S/Sx of angina/ACS. Continue to monitor on telemetry.

## 2023-11-30 NOTE — NURSING
Pt discharged to home with wife at side.  Pt rolled down with w/c to vehicle.  Pt verbalized understanding of discharge orders.  Instructions were provided for pt and pt's spouse on how to drain MILTON drain and record it.   no c/o pain/nausea/vomitting.  Will CTM.

## 2023-11-30 NOTE — PROGRESS NOTES
[facility]/Surg  Neurosurgery  Progress Note    Subjective:     History of Present Illness:   HPI:  This is a very pleasant 77-year-old gentleman, high functioning for his age, who endorses chronic low back pain which has waxed and waned in severity over the last several years.  He presents today, however, primarily due to recent onset of severe left lumbosacral pain with radiation into the left hip, lateral thigh, lateral calf, lateral ankle.  The pain began insidiously upon wakening and standing on the left leg approximately 4-5 weeks ago.  He denies known injury or trauma.  He describes constant aching stabbing pain.  The pain is worse with prolonged standing or walking and somewhat relieved with sitting or lying supine.  He denies extremity weakness, numbness, saddle anesthesia, sphincter dysfunction.  He denies right lower extremity symptoms.  He reports no worsening of his chronic low back pain.  Prior to the onset of the pain he was walking 4-5 miles daily.  He notes that he is now using a rolling walker to assist with ambulation.  He has been taking Percocet 5/325 for the past 4 weeks with little, if any, improvement.  He was also prescribed a short course of prednisone.  He has not undergone interventional pain procedures.     He has a history of CAD, status post CABG in 2003, on baby aspirin.  He has a history of atrial fibrillation, on Xarelto.  He has a history of well-controlled hypertension.  He is followed by Dr. Rosales Gallegos.      Patient is status post bilateral L4-5 laminectomy performed on 11/30/2023.    Post-Op Info:  Procedure(s) (LRB):  LAMINECTOMY, SPINE (N/A)   1 Day Post-Op   Interval History: 11/30:  No acute events overnight.  Afebrile.  Vital signs stable.  Hemoglobin 12.6.  Patient found sitting at the side of the bed, awake and alert.  He reports mild incisional pain.  He reports improvement in left leg pain and numbness since surgery.  He was able to stand without  assistance during encounter.  Denies new weakness, numbness or tingling.  Tolerating diet.  Voiding spontaneously.  Reports flatulence.  MILTON drain x1 in place with 235 mL of output overnight.    Medications:  Continuous Infusions:   electrolyte-S (pH 7.4) Stopped (11/29/23 1920)    lactated ringers       Scheduled Meds:   bisacodyL  10 mg Rectal Daily    methocarbamol (ROBAXIN) IVPB  1,000 mg Intravenous Q8H    mupirocin   Nasal BID    oxyCODONE-acetaminophen  1 tablet Oral Q6H    prazosin  2 mg Oral BID    sotaloL  80 mg Oral BID     PRN Meds:acetaminophen, aluminum-magnesium hydroxide-simethicone, HYDROmorphone, magnesium oxide, magnesium oxide, ondansetron, oxyCODONE, potassium bicarbonate, potassium bicarbonate, potassium bicarbonate, potassium, sodium phosphates, potassium, sodium phosphates, potassium, sodium phosphates, prochlorperazine, senna-docusate 8.6-50 mg       Objective:     Weight: 83.5 kg (184 lb)  Body mass index is 26.4 kg/m².  Vital Signs (Most Recent):  Temp: 98.1 °F (36.7 °C) (11/30/23 0759)  Pulse: 68 (11/30/23 0759)  Resp: 18 (11/30/23 0759)  BP: 139/75 (11/30/23 0914)  SpO2: 97 % (11/30/23 0759) Vital Signs (24h Range):  Temp:  [96.4 °F (35.8 °C)-98.1 °F (36.7 °C)] 98.1 °F (36.7 °C)  Pulse:  [54-78] 68  Resp:  [12-20] 18  SpO2:  [94 %-100 %] 97 %  BP: ()/(53-78) 139/75     Date 11/30/23 0700 - 12/01/23 0659   Shift 1646-2614 6924-8586 5643-2455 24 Hour Total   INTAKE   Shift Total(mL/kg)       OUTPUT   Drains 70   70   Shift Total(mL/kg) 70(0.8)   70(0.8)   Weight (kg) 83.5 83.5 83.5 83.5                            Closed/Suction Drain 11/29/23 Tube - 1 Back Bulb 10 Fr. (Active)   Site Description Unable to view 11/29/23 2000   Dressing Type Gauze 11/29/23 2000   Dressing Status Clean;Dry;Intact 11/30/23 0400   Dressing Intervention Integrity maintained 11/29/23 2000   Drainage Bloody 11/29/23 2000   Status To bulb suction 11/30/23 0400   Output (mL) 70 mL 11/30/23 0734  "        Neurosurgery Physical Exam  General: well developed, well nourished, no distress.   Head: normocephalic, atraumatic  Neck: No tracheal deviation.   Neurologic: Alert and oriented. Thought content appropriate.  GCS: E4 V5 M6; Total: 15  Pulmonary: normal respirations, no signs of respiratory distress  Skin: Skin is warm, dry and intact.    Sensory: intact to light touch throughout  Motor Strength:  Bilateral upper extremity strength 5/5 throughout.  Bilateral lower extremity strength 5/5 throughout.    Posterior lumbar incision:  Incision covered with dry bandage. No surrounding erythema or edema. No drainage from incision. No palpable hematoma or underlying fluid collection.  MILTON drain x1 in place.    Significant Labs:  Recent Labs   Lab 11/30/23 0437   *      K 3.8      CO2 23   BUN 8   CREATININE 0.7   CALCIUM 8.6*     Recent Labs   Lab 11/30/23  0437   WBC 10.00   HGB 12.6*   HCT 36.3*        No results for input(s): "LABPT", "INR", "APTT" in the last 48 hours.  Microbiology Results (last 7 days)       ** No results found for the last 168 hours. **            Assessment/Plan:     Status post laminectomy  Mr. Taco Odonnell is a 77-year-old gentleman, high functioning for his age, who endorses chronic low back pain which has waxed and waned in severity over the last several years.  He presents today, however, primarily due to recent onset of severe left lumbosacral pain with radiation into the left hip, lateral thigh, lateral calf, lateral ankle.Patient is now status post bilateral L4-5 laminectomy performed on 11/30/2023.    POD 1.      Neurologically intact.      --Patient admitted to Neurosurgery on telemetry      -q4h neurochecks on floor  --All labs and diagnostics reviewed  --Lumbar follow-up post-op XRs demonstrated no acute findings.  --MILTON Drains to remain at this time on suction  --PT/OT/OOB  --Pain control with multimodal pain regimen   --TEDs/SCDs  --Continue to monitor " clinically, notify NSGY immediately with any changes in neuro status    Dispo:  After physical therapy evaluation anticipate discharge this afternoon.  Drain will remain in place.  Postoperative orders will be placed at patient's desired pharmacy.    Discussed with Dr. Gallegos.            SONJA RANKIN PA-C  Neurosurgery  [facility]/Surg

## 2023-11-30 NOTE — PLAN OF CARE
Problem: Adult Inpatient Plan of Care  Goal: Plan of Care Review  Outcome: Met  Goal: Patient-Specific Goal (Individualized)  Outcome: Met  Goal: Absence of Hospital-Acquired Illness or Injury  Outcome: Met  Goal: Optimal Comfort and Wellbeing  Outcome: Met  Goal: Readiness for Transition of Care  Outcome: Met     Problem: Impaired Wound Healing  Goal: Optimal Wound Healing  Outcome: Met     Problem: Pain Acute  Goal: Acceptable Pain Control and Functional Ability  Outcome: Met     Problem: Fall Injury Risk  Goal: Absence of Fall and Fall-Related Injury  Outcome: Met   POC has been reviewed with pt and spouse.  Pain has been controlled with current pain regimen.  No falls during this shift.  No replacements needed this shift.  Pt is to go home with MILTON drain still intact and proper teaching and instruction was given to wife and pt on how to drain it and record it.  Pt received meds from [facility] pharmacy; wife had them on her person.  Pt voided on his own after bauer catheter was discontinued.  Pt adequate for discharge home today with [facility] to visit.

## 2023-11-30 NOTE — PLAN OF CARE
Discussed HH services with patient at bedside. Pt in agreement - does not have agency preference.     HH orders sent to [facility] via [employer]     11/30/23 0037   Post-Acute Status   Post-Acute Authorization Home Health   Home Health Status Referrals Sent   Patient choice form signed by patient/caregiver List from System Post-Acute Care

## 2023-11-30 NOTE — ANESTHESIA POSTPROCEDURE EVALUATION
Anesthesia Post Evaluation    Patient: Taco Odonnell Jr    Procedure(s) Performed: Procedure(s) (LRB):  LAMINECTOMY, SPINE (N/A)    Final Anesthesia Type: general      Patient location during evaluation: PACU  Patient participation: Yes- Able to Participate  Level of consciousness: awake and alert  Post-procedure vital signs: reviewed and stable  Pain management: adequate  Airway patency: patent    PONV status at discharge: No PONV  Anesthetic complications: no      Cardiovascular status: blood pressure returned to baseline  Respiratory status: unassisted  Hydration status: euvolemic  Follow-up not needed.              Vitals Value Taken Time   /59 11/29/23 1902   Temp 36.7 °C (98.1 °F) 11/29/23 1810   Pulse 72 11/29/23 1903   Resp 19 11/29/23 1903   SpO2 96 % 11/29/23 1903   Vitals shown include unvalidated device data.      No case tracking events are documented in the log.      Pain/Gold Score: Pain Rating Prior to Med Admin: 2 (11/29/2023  6:40 PM)  Gold Score: 10 (11/29/2023  6:30 PM)

## 2023-11-30 NOTE — PLAN OF CARE
Shy Pérez NP to see pt in pacu  vs stable pain controlled no co dsg intact instr on IS and encouraged use Gerardo drain emptied 50 cc bauer 135  joi ck wnl no co of numbness or tingle moderate hand grasps pain tolerable Released from anesthesia to room sats 96 on room air exparell bracelet on had robaxin as ordered

## 2023-11-30 NOTE — ASSESSMENT & PLAN NOTE
POD 0 s/p Bilateral L4-5 laminectomy medial facetectomy and Resection of densely adherent extradural mass with Dr. Gallegos  Continue to follow recommendations of neurosurgeon.  Needs aggressive incentive spirometry.  Follow hemoglobin and hematocrit closely.  Pain control   PT/OT

## 2023-11-30 NOTE — CONSULTS
[facility] Medicine  Consult Note    Patient Name: Taco Odonnell Jr  MRN: [id number]  Admission Date: 11/29/2023  Hospital Length of Stay: 0 days  Attending Physician: Jhony Gallegos DO   Primary Care Provider: Rosales Gallegos MD           Patient information was obtained from patient and past medical records.     Consults  Subjective:     Principal Problem: Lumbar stenosis    Chief Complaint:   Chief Complaint   Patient presents with    Procedure        HPI: Taco Odonnell Jr is a 79 y/o M with past medical history significant for HTN, PAF, CAD s/p CABG in 2003, and HLD who underwent a lumbar laminectomy earlier today with Dr. Gallegos.  Preoperatively, he was evaluated by Dr. Gallegos in the outpatient setting for c/o recent onset of severe left lumbosacral pain with radiation into the left hip, lateral thigh, lateral calf, lateral ankle which was an acute change from his chronic back pain,eventually leading to the need for an assistive device to ambulate.   MRI obtained and reviewed with surgical intervention recommended.  He is seen this evening in the PACU.  He is awake and alert, hemodynamically stable.  Hospital medicine has been asked to consult for assistance with medical management.    Past Medical History:   Diagnosis Date    Benign hypertension     Coronary artery disease     Glaucoma     Hyperlipidemia LDL goal < 70     Myocardial infarction 2001    Paroxysmal atrial fibrillation        Past Surgical History:   Procedure Laterality Date    CATARACT EXTRACTION      CORONARY ARTERY BYPASS GRAFT  01/01/2003    ROTATOR CUFF REPAIR  01/01/2010       Review of patient's allergies indicates:   Allergen Reactions    Penicillin g Other (See Comments)     Showed on allergy test        No current facility-administered medications on file prior to encounter.     Current Outpatient Medications on File Prior to Encounter   Medication Sig    hydroCHLOROthiazide (HYDRODIURIL) 12.5 MG Tab  Take 1 tablet (12.5 mg total) by mouth every morning.    sotaloL (BETAPACE) 80 MG tablet Take 1 tablet (80 mg total) by mouth 2 (two) times daily.    terazosin (HYTRIN) 5 MG capsule Take 1 capsule (5 mg total) by mouth every evening.    [DISCONTINUED] evolocumab (REPATHA SURECLICK) 140 mg/mL PnIj Inject 1 mL (140 mg total) into the skin every 14 (fourteen) days.    [DISCONTINUED] aspirin (ECOTRIN) 81 MG EC tablet Take 81 mg by mouth.  PATIENT STATED HE TAKES 3 TIMES WEEKLY    [DISCONTINUED] coenzyme Q10 200 mg capsule Take 200 mg by mouth once daily.    [DISCONTINUED] FLAXSEED OIL ORAL Take 1,200 mg by mouth 2 (two) times daily.    [DISCONTINUED] multivitamin capsule Take 1 capsule by mouth once daily.    [DISCONTINUED] oxyCODONE-acetaminophen (PERCOCET) 5-325 mg per tablet TAKE 1 TABLET EVERY 4 HOURS AS NEEEDED FOR HIP PAIN (Patient not taking: Reported on 11/22/2023)    [DISCONTINUED] oxyCODONE-acetaminophen (PERCOCET) 5-325 mg per tablet Take 1 tablet by mouth every 4 (four) hours as needed for for lumbar spinal stenosis & sciatica.. (Patient not taking: Reported on 11/9/2023)    [DISCONTINUED] predniSONE (DELTASONE) 20 MG tablet Take 0.5 to 1 tablet (20 mg total) by mouth every 12 (twelve) hours for sciatica. (Patient not taking: Reported on 11/7/2023)    [DISCONTINUED] predniSONE (DELTASONE) 5 MG tablet Take 1 tablet twice a day for 5 days (Patient not taking: Reported on 11/7/2023)    [DISCONTINUED] predniSONE (DELTASONE) 5 MG tablet Take 1 tablet (5 mg total) by mouth 2 (two) times daily as needed for lumbar spinal stenosis & sciatica. (Patient not taking: Reported on 11/17/2023)    [DISCONTINUED] rivaroxaban (XARELTO) 20 mg Tab Take 1 tablet (20 mg total) by mouth once daily.     Family History       Problem Relation (Age of Onset)    Cancer Father    Dementia Father    Heart attack Mother, Sister    Hyperlipidemia Mother, Sister          Tobacco Use    Smoking status: Former     Current packs/day: 0.00      Average packs/day: 1 pack/day for 40.0 years (40.0 ttl pk-yrs)     Types: Cigarettes     Start date: 1962     Quit date: 2002     Years since quittin.9    Smokeless tobacco: Never   Substance and Sexual Activity    Alcohol use: No    Drug use: No    Sexual activity: Not on file     Review of Systems   Musculoskeletal:  Positive for back pain and gait problem.     Objective:     Vital Signs (Most Recent):  Temp: 97.6 °F (36.4 °C) (23)  Pulse: 75 (23)  Resp: 18 (23)  BP: 116/77 (23)  SpO2: (!) 94 % (23) Vital Signs (24h Range):  Temp:  [97.2 °F (36.2 °C)-98.1 °F (36.7 °C)] 97.6 °F (36.4 °C)  Pulse:  [66-78] 75  Resp:  [12-20] 18  SpO2:  [94 %-100 %] 94 %  BP: ()/(53-78) 116/77     Weight: 83.5 kg (184 lb)  Body mass index is 26.4 kg/m².     Physical Exam  Constitutional:       General: He is not in acute distress.     Appearance: He is well-developed. He is not ill-appearing.   HENT:      Head: Normocephalic and atraumatic.   Eyes:      Conjunctiva/sclera: Conjunctivae normal.      Pupils: Pupils are equal, round, and reactive to light.   Cardiovascular:      Rate and Rhythm: Normal rate and regular rhythm.      Pulses: Normal pulses.      Heart sounds: Normal heart sounds.   Pulmonary:      Effort: Pulmonary effort is normal.      Breath sounds: Normal breath sounds.   Abdominal:      General: Bowel sounds are normal.      Palpations: Abdomen is soft.      Tenderness: There is no abdominal tenderness.   Musculoskeletal:         General: Normal range of motion.      Cervical back: Normal range of motion and neck supple.   Skin:     General: Skin is warm and dry.      Comments: MILTON drain with sanguinous drainage.   Neurological:      General: No focal deficit present.      Mental Status: He is alert and oriented to person, place, and time.   Psychiatric:         Mood and Affect: Mood normal.         Behavior: Behavior normal.          Significant  Labs: All pertinent labs within the past 24 hours have been reviewed.    Significant Imaging: I have reviewed all pertinent imaging results/findings within the past 24 hours.  Assessment/Plan:     * Lumbar stenosis  POD 0 s/p Bilateral L4-5 laminectomy medial facetectomy and Resection of densely adherent extradural mass with Dr. Gallegos  Continue to follow recommendations of neurosurgeon.  Needs aggressive incentive spirometry.  Follow hemoglobin and hematocrit closely.  Pain control   PT/OT      Paroxysmal atrial fibrillation  Patient with Paroxysmal (<7 days) atrial fibrillation which is controlled currently with Beta Blocker. Patient is currently in sinus rhythm.[id number] Score: 3. Anticoagulation indicated. Anticoagulation done with xarelto, on hold currently due to surgical procedure.  Resume when OK with surgeon .    Benign hypertension  Chronic, controlled. Latest blood pressure and vitals reviewed-     Temp:  [97.2 °F (36.2 °C)-98.1 °F (36.7 °C)]   Pulse:  [66-78]   Resp:  [12-20]   BP: ()/(53-78)   SpO2:  [94 %-100 %] .   Home meds for hypertension were reviewed and noted below.   Hypertension Medications               hydroCHLOROthiazide (HYDRODIURIL) 12.5 MG Tab Take 1 tablet (12.5 mg total) by mouth every morning.    sotaloL (BETAPACE) 80 MG tablet Take 1 tablet (80 mg total) by mouth 2 (two) times daily.    terazosin (HYTRIN) 5 MG capsule Take 1 capsule (5 mg total) by mouth every evening.            While in the hospital, will manage blood pressure as follows; Continue home antihypertensive regimen    Will utilize p.r.n. blood pressure medication only if patient's blood pressure greater than 180/110 and he develops symptoms such as worsening chest pain or shortness of breath.    Coronary artery disease  Patient with known CAD s/p CABG, which is controlled Will continue ASA and monitor for S/Sx of angina/ACS. Continue to monitor on telemetry.       VTE Risk Mitigation (From admission, onward)            Ordered     Place CLIFF hose  Until discontinued         11/29/23 6200                        Thank you for your consult. I will follow-up with patient. Please contact us if you have any additional questions.    JUANCARLOS Polk  Department of Hospital Medicine   [facility]/Surg

## 2023-11-30 NOTE — TRANSFER OF CARE
"Anesthesia Transfer of Care Note    Patient: Taco Odonnell Jr    Procedure(s) Performed: Procedure(s) (LRB):  LAMINECTOMY, SPINE (N/A)    Patient location: PACU    Anesthesia Type: general    Transport from OR: Transported from OR on 2-3 L/min O2 by NC with adequate spontaneous ventilation    Post pain: adequate analgesia    Post assessment: no apparent anesthetic complications and tolerated procedure well    Post vital signs: stable    Level of consciousness: awake    Nausea/Vomiting: no nausea/vomiting    Complications: none    Transfer of care protocol was followed      Last vitals: Visit Vitals  /78 (BP Location: Left arm, Patient Position: Lying)   Pulse 78   Temp 36.5 °C (97.7 °F) (Temporal)   Resp 16   Ht 5' 10" (1.778 m)   Wt 83.5 kg (184 lb)   SpO2 97%   BMI 26.40 kg/m²     "

## 2023-11-30 NOTE — HOSPITAL COURSE
11/30:  No acute events overnight.  Afebrile.  Vital signs stable.  Hemoglobin 12.6.  Patient found sitting at the side of the bed, awake and alert.  He reports mild incisional pain.  He reports improvement in left leg pain and numbness since surgery.  He was able to stand without assistance during encounter.  Denies new weakness, numbness or tingling.  Tolerating diet.  Voiding spontaneously.  Reports flatulence.  MILTON drain x1 in place with 235 mL of output overnight.

## 2023-11-30 NOTE — HOSPITAL COURSE
Taco Odonnell Jr admitted status post lumbar laminectomy by.  Dr. Gallegos.  Patient was evaluated by Hospital Medicine for assistance with medical management during hospital stay.  Patient work with physical therapy and occupational therapy during hospital stay.  Patient required no p.r.n. pain medications. Patient tolerating treatment plan. Patient denies fever, chills, shortness of breath, nausea, vomiting, and diarrhea.  Patient advised to continue aggressive incentive spirometer use.  Dressing is clean dry and intact at time of exam. He was deemed medically cleared for discharge pending orders from Dr. Gallegos.

## 2023-11-30 NOTE — PT/OT/SLP EVAL
Occupational Therapy   Evaluation and Discharge Note    Name: Taco Odonnell Jr  MRN: [id number]  Admitting Diagnosis: Lumbar stenosis  Recent Surgery: Procedure(s) (LRB):  LAMINECTOMY, SPINE (N/A) 1 Day Post-Op    Recommendations:     Discharge Recommendations: Low Intensity Therapy  Discharge Equipment Recommendations: none  Barriers to discharge:  None    Assessment:     Taco Odonnell Jr is a 78 y.o. male with a medical diagnosis of Lumbar stenosis. At this time, patient is modified independent with ADLs. Patient reported minimal back pain with mobility. Patient does not require further acute OT services.     Plan:     During this hospitalization, patient does not require further acute OT services.  Please re-consult if situation changes.    Plan of Care Reviewed with: patient    Subjective     Chief Complaint: minimal back pain  Patient/Family Comments/goals: none    Occupational Profile:  Living Environment: Patient lives alone. Patient has two siblings who live next door to patient.  Previous level of function: Patient was independent with ADLs and was ambulatory using RW for the past 6 months prior to sx.   Equipment Used at home: walker, rolling  Assistance upon Discharge: Patient will receive assistance from family.     Pain/Comfort:  Pain Rating 1: 3/10  Location - Orientation 1: lower  Location 1: back  Pain Addressed 1: Reposition, Distraction  Pain Rating Post-Intervention 1: 3/10    Patients cultural, spiritual, Sikh conflicts given the current situation: no    Objective:     Communicated with: nurse Saunders prior to session.  Patient found HOB elevated with bauer catheter (avasys in room) upon OT entry to room.    General Precautions: Standard, fall  Orthopedic Precautions: spinal precautions  Braces: N/A  Respiratory Status: Room air     Occupational Performance:    Bed Mobility:    Patient completed Scooting/Bridging with modified independence  Patient completed Supine to Sit with modified  independence  Patient completed Sit to Supine with modified independence    Functional Mobility/Transfers:  Patient completed Sit <> Stand Transfer with modified independence  with  rolling walker   Patient completed Toilet Transfer Stand Pivot technique with modified independence with  rolling walker    Activities of Daily Living:  Grooming: modified independence with all grooming tasks while standing at sink  Lower Body Dressing: modified independence to don/doff socks while seated EOB  Toileting: modified independence     Cognitive/Visual Perceptual:  Cognitive/Psychosocial Skills:     -       Oriented to: x4   -       Follows Commands/attention:Follows multistep  commands  -       Communication: clear/fluent  -       Safety awareness/insight to disability: intact   -       Mood/Affect/Coping skills/emotional control: Appropriate to situation and Cooperative  Visual/Perceptual:      -Intact     Physical Exam:  Postural examination/scapula alignment:    -       Rounded shoulders  -       Forward head  Upper Extremity Range of Motion:     -       Right Upper Extremity: WFL  -       Left Upper Extremity: WFL  Upper Extremity Strength:    -       Right Upper Extremity: WFL  -       Left Upper Extremity: WFL   Strength:    -       Right Upper Extremity: WFL  -       Left Upper Extremity: WFL  Fine Motor Coordination:    -       Intact  Gross motor coordination:   WFL    AMPAC 6 Click ADL:  AMPAC Total Score: 24    Treatment & Education:  OT ed pt on OT role & POC as well as discharge recommendations.  OT ed patient on safety with walker use for functional mobility with cues for hand placement & sequencing.   OT ed pt on spinal precautions with demonstration provide and pt verbalized understanding.      Patient left HOB elevated with all lines intact and call button in reach    GOALS:   Multidisciplinary Problems       Occupational Therapy Goals       Not on file                    History:     Past Medical  History:   Diagnosis Date    Benign hypertension     Coronary artery disease     Glaucoma     Hyperlipidemia LDL goal < 70     Myocardial infarction 2001    Paroxysmal atrial fibrillation          Past Surgical History:   Procedure Laterality Date    CATARACT EXTRACTION      CORONARY ARTERY BYPASS GRAFT  01/01/2003    ROTATOR CUFF REPAIR  01/01/2010       Time Tracking:     OT Date of Treatment: 11/30/23  OT Start Time: 0858  OT Stop Time: 0922  OT Total Time (min): 24 min    Billable Minutes:Evaluation 10  Self Care/Home Management 14    11/30/2023

## 2023-11-30 NOTE — SUBJECTIVE & OBJECTIVE
Interval History: 11/30:  No acute events overnight.  Afebrile.  Vital signs stable.  Hemoglobin 12.6.  Patient found sitting at the side of the bed, awake and alert.  He reports mild incisional pain.  He reports improvement in left leg pain and numbness since surgery.  He was able to stand without assistance during encounter.  Denies new weakness, numbness or tingling.  Tolerating diet.  Voiding spontaneously.  Reports flatulence.  MILTON drain x1 in place with 235 mL of output overnight.    Medications:  Continuous Infusions:   electrolyte-S (pH 7.4) Stopped (11/29/23 1920)    lactated ringers       Scheduled Meds:   bisacodyL  10 mg Rectal Daily    methocarbamol (ROBAXIN) IVPB  1,000 mg Intravenous Q8H    mupirocin   Nasal BID    oxyCODONE-acetaminophen  1 tablet Oral Q6H    prazosin  2 mg Oral BID    sotaloL  80 mg Oral BID     PRN Meds:acetaminophen, aluminum-magnesium hydroxide-simethicone, HYDROmorphone, magnesium oxide, magnesium oxide, ondansetron, oxyCODONE, potassium bicarbonate, potassium bicarbonate, potassium bicarbonate, potassium, sodium phosphates, potassium, sodium phosphates, potassium, sodium phosphates, prochlorperazine, senna-docusate 8.6-50 mg       Objective:     Weight: 83.5 kg (184 lb)  Body mass index is 26.4 kg/m².  Vital Signs (Most Recent):  Temp: 98.1 °F (36.7 °C) (11/30/23 0759)  Pulse: 68 (11/30/23 0759)  Resp: 18 (11/30/23 0759)  BP: 139/75 (11/30/23 0914)  SpO2: 97 % (11/30/23 0759) Vital Signs (24h Range):  Temp:  [96.4 °F (35.8 °C)-98.1 °F (36.7 °C)] 98.1 °F (36.7 °C)  Pulse:  [54-78] 68  Resp:  [12-20] 18  SpO2:  [94 %-100 %] 97 %  BP: ()/(53-78) 139/75     Date 11/30/23 0700 - 12/01/23 0659   Shift 7658-0078 9066-7610 6790-2831 24 Hour Total   INTAKE   Shift Total(mL/kg)       OUTPUT   Drains 70   70   Shift Total(mL/kg) 70(0.8)   70(0.8)   Weight (kg) 83.5 83.5 83.5 83.5                            Closed/Suction Drain 11/29/23 Tube - 1 Back Bulb 10 Fr. (Active)   Site  "Description Unable to view 11/29/23 2000   Dressing Type Gauze 11/29/23 2000   Dressing Status Clean;Dry;Intact 11/30/23 0400   Dressing Intervention Integrity maintained 11/29/23 2000   Drainage Bloody 11/29/23 2000   Status To bulb suction 11/30/23 0400   Output (mL) 70 mL 11/30/23 0734         Neurosurgery Physical Exam  General: well developed, well nourished, no distress.   Head: normocephalic, atraumatic  Neck: No tracheal deviation.   Neurologic: Alert and oriented. Thought content appropriate.  GCS: E4 V5 M6; Total: 15  Pulmonary: normal respirations, no signs of respiratory distress  Skin: Skin is warm, dry and intact.    Sensory: intact to light touch throughout  Motor Strength:  Bilateral upper extremity strength 5/5 throughout.  Bilateral lower extremity strength 5/5 throughout.    Posterior lumbar incision:  Incision covered with dry bandage. No surrounding erythema or edema. No drainage from incision. No palpable hematoma or underlying fluid collection.  MILTON drain x1 in place.    Significant Labs:  Recent Labs   Lab 11/30/23  0437   *      K 3.8      CO2 23   BUN 8   CREATININE 0.7   CALCIUM 8.6*     Recent Labs   Lab 11/30/23  0437   WBC 10.00   HGB 12.6*   HCT 36.3*        No results for input(s): "LABPT", "INR", "APTT" in the last 48 hours.  Microbiology Results (last 7 days)       ** No results found for the last 168 hours. **            "

## 2023-11-30 NOTE — PROGRESS NOTES
[facility]  Progress Note    Patient Name: Taco Odonnell Jr  MRN: [id number]  Patient Class: OP- Outpatient Recovery   Admission Date: 11/29/2023  Length of Stay: 0 days  Attending Physician: Jhony Gallegos DO  Primary Care Provider: Rosales Gallegos MD        Subjective:     Principal Problem:Lumbar stenosis        HPI:  Taco Odonnell Jr is a 79 y/o M with past medical history significant for HTN, PAF, CAD s/p CABG in 2003, and HLD who underwent a lumbar laminectomy earlier today with Dr. Gallegos.  Preoperatively, he was evaluated by Dr. Gallegos in the outpatient setting for c/o recent onset of severe left lumbosacral pain with radiation into the left hip, lateral thigh, lateral calf, lateral ankle which was an acute change from his chronic back pain,eventually leading to the need for an assistive device to ambulate.   MRI obtained and reviewed with surgical intervention recommended.  He is seen this evening in the PACU.  He is awake and alert, hemodynamically stable.  Hospital medicine has been asked to consult for assistance with medical management.    Overview/Hospital Course:  Taco Odonnell Jr admitted status post lumbar laminectomy by.  Dr. Gallegos.  Patient was evaluated by Hospital Medicine for assistance with medical management during hospital stay.  Patient work with physical therapy and occupational therapy during hospital stay.  Patient required no p.r.n. pain medications. Patient tolerating treatment plan. Patient denies fever, chills, shortness of breath, nausea, vomiting, and diarrhea.  Patient advised to continue aggressive incentive spirometer use.  Dressing is clean dry and intact at time of exam. He was deemed medically cleared for discharge pending orders from Dr. Gallegos.     Interval History:    Patient seen and examined.  Patient is sitting up in chair in no acute distress.  Patient states that he worked with therapy this morning, which was well tolerated.   Patient not requiring any p.r.n. pain  medications.  He was re-educated on aggressive incentive spirometer use, for prevention of postoperative pneumonia.  Patient verbalized understanding of treatment plan.     Review of Systems   Constitutional:  Negative for activity change, appetite change, chills, fatigue and fever.   HENT:  Negative for congestion, facial swelling, hearing loss, rhinorrhea, sinus pressure and sinus pain.    Eyes:  Negative for photophobia and visual disturbance.   Respiratory:  Negative for cough, chest tightness, shortness of breath and wheezing.    Cardiovascular:  Negative for chest pain, palpitations and leg swelling.   Gastrointestinal:  Negative for abdominal distention, abdominal pain, constipation, diarrhea, nausea and vomiting.   Endocrine: Negative.    Genitourinary:  Negative for decreased urine volume, difficulty urinating, dysuria and urgency.   Musculoskeletal:  Positive for back pain (Expected postop pain). Negative for arthralgias, gait problem, joint swelling, myalgias, neck pain and neck stiffness.   Skin: Negative.    Allergic/Immunologic: Negative.    Neurological:  Negative for tremors, facial asymmetry, speech difficulty, weakness, light-headedness, numbness and headaches.   Hematological: Negative.    Psychiatric/Behavioral: Negative.       Objective:     Vital Signs (Most Recent):  Temp: 98.1 °F (36.7 °C) (11/30/23 0759)  Pulse: 68 (11/30/23 0759)  Resp: 18 (11/30/23 0759)  BP: 139/75 (11/30/23 0914)  SpO2: 97 % (11/30/23 0900) Vital Signs (24h Range):  Temp:  [96.4 °F (35.8 °C)-98.1 °F (36.7 °C)] 98.1 °F (36.7 °C)  Pulse:  [54-78] 68  Resp:  [12-20] 18  SpO2:  [94 %-100 %] 97 %  BP: ()/(53-78) 139/75     Weight: 83.5 kg (184 lb)  Body mass index is 26.4 kg/m².    Intake/Output Summary (Last 24 hours) at 11/30/2023 1110  Last data filed at 11/30/2023 0734  Gross per 24 hour   Intake 2365 ml   Output 2120 ml   Net 245 ml         Physical Exam  Vitals and nursing  note reviewed.   Constitutional:       Appearance: Normal appearance. He is well-developed.   HENT:      Head: Normocephalic and atraumatic.      Nose: Nose normal. No septal deviation.   Eyes:      Conjunctiva/sclera: Conjunctivae normal.   Neck:      Thyroid: No thyroid mass.      Vascular: No JVD.      Trachea: No tracheal tenderness or tracheal deviation.   Cardiovascular:      Rate and Rhythm: Normal rate and regular rhythm.      Heart sounds: S1 normal and S2 normal. No murmur heard.     No friction rub. No gallop.   Pulmonary:      Effort: Pulmonary effort is normal.      Breath sounds: Normal breath sounds. No decreased breath sounds, wheezing, rhonchi or rales.   Abdominal:      General: Bowel sounds are normal. There is no distension.      Palpations: Abdomen is soft. There is no hepatomegaly, splenomegaly or mass.      Tenderness: There is no abdominal tenderness.   Musculoskeletal:         General: Normal range of motion.      Cervical back: Normal range of motion.      Right lower leg: No edema.      Left lower leg: No edema.   Skin:     General: Skin is warm.      Findings: No rash.             Comments: Surgical incision to back.  Dressing clean dry and intact.  No warmth, tenderness, redness at site   Neurological:      General: No focal deficit present.      Mental Status: He is alert and oriented to person, place, and time.      Cranial Nerves: No cranial nerve deficit.      Sensory: No sensory deficit.   Psychiatric:         Mood and Affect: Mood normal.         Behavior: Behavior normal.             Significant Labs: All pertinent labs within the past 24 hours have been reviewed.  BMP:   Recent Labs   Lab 11/30/23 0437   *      K 3.8      CO2 23   BUN 8   CREATININE 0.7   CALCIUM 8.6*     CBC:   Recent Labs   Lab 11/30/23 0437   WBC 10.00   HGB 12.6*   HCT 36.3*          Significant Imaging:     XR Lumbar Spine:  There is a gentle broad levocurvature of the lumbar  spine.  There is marked disc space narrowing towards the right at the L2-3 and L3-4 levels.  There is mild left lateral subluxation of L3 with respect to L4.  There is moderate to marked disc space narrowing towards the left at the L4-5 level.  There is endplate osteophyte formation and sclerosis at these levels.  Vertebral body height is preserved.  There is no fracture.  There is stable mild anterolisthesis of L4 on L5, L2 on L3.  There is multilevel facet joint arthropathy.  Posterior skin staples are in place.  There has been interval L4-5 laminectom         Assessment/Plan:      * Lumbar stenosis  POD 0 s/p Bilateral L4-5 laminectomy medial facetectomy and Resection of densely adherent extradural mass with Dr. Gallegos  Continue to follow recommendations of neurosurgeon.  Needs aggressive incentive spirometry.  Follow hemoglobin and hematocrit closely.  Pain control   PT/OT      Paroxysmal atrial fibrillation  Patient with Paroxysmal (<7 days) atrial fibrillation which is controlled currently with Beta Blocker. Patient is currently in sinus rhythm.[id number] Score: 3. Anticoagulation indicated. Anticoagulation done with xarelto, on hold currently due to surgical procedure.  Resume when OK with surgeon .    Benign hypertension  Chronic, controlled. Latest blood pressure and vitals reviewed-     Temp:  [97.2 °F (36.2 °C)-98.1 °F (36.7 °C)]   Pulse:  [66-78]   Resp:  [12-20]   BP: ()/(53-78)   SpO2:  [94 %-100 %] .   Home meds for hypertension were reviewed and noted below.   Hypertension Medications               hydroCHLOROthiazide (HYDRODIURIL) 12.5 MG Tab Take 1 tablet (12.5 mg total) by mouth every morning.    sotaloL (BETAPACE) 80 MG tablet Take 1 tablet (80 mg total) by mouth 2 (two) times daily.    terazosin (HYTRIN) 5 MG capsule Take 1 capsule (5 mg total) by mouth every evening.            While in the hospital, will manage blood pressure as follows; Continue home antihypertensive regimen    Will  utilize p.r.n. blood pressure medication only if patient's blood pressure greater than 180/110 and he develops symptoms such as worsening chest pain or shortness of breath.    Coronary artery disease  Patient with known CAD s/p CABG, which is controlled Will continue ASA and monitor for S/Sx of angina/ACS. Continue to monitor on telemetry.       VTE Risk Mitigation (From admission, onward)           Ordered     Place CLIFF hose  Until discontinued         11/29/23 1202                    Discharge Planning   YUAN: 11/30/2023     Code Status: Not on file   Is the patient medically ready for discharge?:     Reason for patient still in hospital (select all that apply): Consult recommendations                     Blake Zavala NP  Department of Hospital Medicine   [facility]/Surg

## 2023-11-30 NOTE — ASSESSMENT & PLAN NOTE
Mr. Taco Odonnell is a 77-year-old gentleman, high functioning for his age, who endorses chronic low back pain which has waxed and waned in severity over the last several years.  He presents today, however, primarily due to recent onset of severe left lumbosacral pain with radiation into the left hip, lateral thigh, lateral calf, lateral ankle.Patient is now status post bilateral L4-5 laminectomy performed on 11/30/2023.    POD 1.      Neurologically intact.      --Patient admitted to Neurosurgery on telemetry      -q4h neurochecks on floor  --All labs and diagnostics reviewed  --Lumbar follow-up post-op XRs demonstrated no acute findings.  --MILTON Drains to remain at this time on suction  --PT/OT/OOB  --Pain control with multimodal pain regimen   --TEDs/SCDs  --Continue to monitor clinically, notify NSGY immediately with any changes in neuro status    Dispo:  After physical therapy evaluation anticipate discharge this afternoon.  Drain will remain in place.  Postoperative orders will be placed at patient's desired pharmacy.    Discussed with Dr. Gallegos.

## 2023-11-30 NOTE — PT/OT/SLP EVAL
Physical Therapy Evaluation and Discharge Note    Patient Name:  Taco Odonnell Jr   MRN:  [id number]    Recommendations:     Discharge Recommendations: Low Intensity Therapy  Discharge Equipment Recommendations: none   Barriers to discharge: None    Assessment:     Taco Odonnell Jr is a 78 y.o. male admitted with a medical diagnosis of Lumbar stenosis. .  At this time, patient is functioning at their prior level of function and does not require further acute PT services.     Recent Surgery: Procedure(s) (LRB):  LAMINECTOMY, SPINE (N/A) 1 Day Post-Op    Plan:     During this hospitalization, patient does not require further acute PT services.  Please re-consult if situation changes.      Subjective     Chief Complaint: pain  Patient/Family Comments/goals: go home  Pain/Comfort:  Pain Rating 1: 3/10  Location - Side 1: Bilateral  Location - Orientation 1: posterior  Location 1: lumbar spine  Pain Addressed 1: Reposition, Cessation of Activity  Pain Rating Post-Intervention 1: 4/10    Patients cultural, spiritual, Mosque conflicts given the current situation:      Living Environment:  Currently lives alone in a 1 story home with 3 step entry and left hand rail but has family living next door.  Prior to admission, patients level of function was modified independent.  Equipment used at home: walker, rolling.  DME owned (not currently used): none.  Upon discharge, patient will have assistance from family.    Objective:     Communicated with nurse prior to session.  Patient found supine with bed alarm upon PT entry to room.    General Precautions: Standard, fall    Orthopedic Precautions:spinal precautions   Braces:    Respiratory Status: Room air    Exams:  RLE ROM: WFL  RLE Strength: Deficits: 4/5 overall  LLE ROM: WFL  LLE Strength: Deficits: 4/5 overall    Functional Mobility:  Bed Mobility:     Supine to Sit: independence  Sit to Supine: independence  Transfers:     Sit to Stand:  independence with rolling  walker  Gait: x 250 feet RW SBA. X 125 feet no aD CGA  Stairs:  Pt ascended/descended 1 flight(s) with No Assistive Device with left handrail with Stand-by Assistance.     AM-PAC 6 CLICK MOBILITY  Total Score:22       Treatment and Education:  Gait training x 250 feet rw SBA, x 125 feet no aD CGA, up/down 1 flight of stairs left hand rail CGA/SBA    AM-PAC 6 CLICK MOBILITY  Total Score:22     Patient left up in chair with call button in reach, chair alarm on, and nurse notified.    GOALS:   Multidisciplinary Problems       Physical Therapy Goals       Not on file                    History:     Past Medical History:   Diagnosis Date    Benign hypertension     Coronary artery disease     Glaucoma     Hyperlipidemia LDL goal < 70     Myocardial infarction 2001    Paroxysmal atrial fibrillation        Past Surgical History:   Procedure Laterality Date    CATARACT EXTRACTION      CORONARY ARTERY BYPASS GRAFT  01/01/2003    ROTATOR CUFF REPAIR  01/01/2010       Time Tracking:     PT Received On: 11/30/23  PT Start Time: 0925     PT Stop Time: 0949  PT Total Time (min): 24 min     Billable Minutes: Evaluation 15 and Gait Training 9      11/30/2023

## 2023-11-30 NOTE — ASSESSMENT & PLAN NOTE
Patient with Paroxysmal (<7 days) atrial fibrillation which is controlled currently with Beta Blocker. Patient is currently in sinus rhythm.[id number] Score: 3. Anticoagulation indicated. Anticoagulation done with xarelto, on hold currently due to surgical procedure.  Resume when OK with surgeon .

## 2023-11-30 NOTE — PLAN OF CARE
[facility]  accepted. SOC 12/1 11/30/23 1215   Post-Acute Status   Post-Acute Authorization Home Health   Home Health Status Set-up Complete/Auth obtained

## 2023-11-30 NOTE — SUBJECTIVE & OBJECTIVE
Interval History:    Patient seen and examined.  Patient is sitting up in chair in no acute distress.  Patient states that he worked with therapy this morning, which was well tolerated.  Patient not requiring any p.r.n. pain  medications.  He was re-educated on aggressive incentive spirometer use, for prevention of postoperative pneumonia.  Patient verbalized understanding of treatment plan.     Review of Systems   Constitutional:  Negative for activity change, appetite change, chills, fatigue and fever.   HENT:  Negative for congestion, facial swelling, hearing loss, rhinorrhea, sinus pressure and sinus pain.    Eyes:  Negative for photophobia and visual disturbance.   Respiratory:  Negative for cough, chest tightness, shortness of breath and wheezing.    Cardiovascular:  Negative for chest pain, palpitations and leg swelling.   Gastrointestinal:  Negative for abdominal distention, abdominal pain, constipation, diarrhea, nausea and vomiting.   Endocrine: Negative.    Genitourinary:  Negative for decreased urine volume, difficulty urinating, dysuria and urgency.   Musculoskeletal:  Positive for back pain (Expected postop pain). Negative for arthralgias, gait problem, joint swelling, myalgias, neck pain and neck stiffness.   Skin: Negative.    Allergic/Immunologic: Negative.    Neurological:  Negative for tremors, facial asymmetry, speech difficulty, weakness, light-headedness, numbness and headaches.   Hematological: Negative.    Psychiatric/Behavioral: Negative.       Objective:     Vital Signs (Most Recent):  Temp: 98.1 °F (36.7 °C) (11/30/23 0759)  Pulse: 68 (11/30/23 0759)  Resp: 18 (11/30/23 0759)  BP: 139/75 (11/30/23 0914)  SpO2: 97 % (11/30/23 0900) Vital Signs (24h Range):  Temp:  [96.4 °F (35.8 °C)-98.1 °F (36.7 °C)] 98.1 °F (36.7 °C)  Pulse:  [54-78] 68  Resp:  [12-20] 18  SpO2:  [94 %-100 %] 97 %  BP: ()/(53-78) 139/75     Weight: 83.5 kg (184 lb)  Body mass index is 26.4 kg/m².    Intake/Output  Summary (Last 24 hours) at 11/30/2023 1110  Last data filed at 11/30/2023 0734  Gross per 24 hour   Intake 2365 ml   Output 2120 ml   Net 245 ml         Physical Exam  Vitals and nursing note reviewed.   Constitutional:       Appearance: Normal appearance. He is well-developed.   HENT:      Head: Normocephalic and atraumatic.      Nose: Nose normal. No septal deviation.   Eyes:      Conjunctiva/sclera: Conjunctivae normal.   Neck:      Thyroid: No thyroid mass.      Vascular: No JVD.      Trachea: No tracheal tenderness or tracheal deviation.   Cardiovascular:      Rate and Rhythm: Normal rate and regular rhythm.      Heart sounds: S1 normal and S2 normal. No murmur heard.     No friction rub. No gallop.   Pulmonary:      Effort: Pulmonary effort is normal.      Breath sounds: Normal breath sounds. No decreased breath sounds, wheezing, rhonchi or rales.   Abdominal:      General: Bowel sounds are normal. There is no distension.      Palpations: Abdomen is soft. There is no hepatomegaly, splenomegaly or mass.      Tenderness: There is no abdominal tenderness.   Musculoskeletal:         General: Normal range of motion.      Cervical back: Normal range of motion.      Right lower leg: No edema.      Left lower leg: No edema.   Skin:     General: Skin is warm.      Findings: No rash.             Comments: Surgical incision to back.  Dressing clean dry and intact.  No warmth, tenderness, redness at site   Neurological:      General: No focal deficit present.      Mental Status: He is alert and oriented to person, place, and time.      Cranial Nerves: No cranial nerve deficit.      Sensory: No sensory deficit.   Psychiatric:         Mood and Affect: Mood normal.         Behavior: Behavior normal.             Significant Labs: All pertinent labs within the past 24 hours have been reviewed.  BMP:   Recent Labs   Lab 11/30/23  0437   *      K 3.8      CO2 23   BUN 8   CREATININE 0.7   CALCIUM 8.6*     CBC:    Recent Labs   Lab 11/30/23  0437   WBC 10.00   HGB 12.6*   HCT 36.3*          Significant Imaging:     XR Lumbar Spine:  There is a gentle broad levocurvature of the lumbar spine.  There is marked disc space narrowing towards the right at the L2-3 and L3-4 levels.  There is mild left lateral subluxation of L3 with respect to L4.  There is moderate to marked disc space narrowing towards the left at the L4-5 level.  There is endplate osteophyte formation and sclerosis at these levels.  Vertebral body height is preserved.  There is no fracture.  There is stable mild anterolisthesis of L4 on L5, L2 on L3.  There is multilevel facet joint arthropathy.  Posterior skin staples are in place.  There has been interval L4-5 laminectom

## 2023-11-30 NOTE — HPI
HPI:  This is a very pleasant 77-year-old gentleman, high functioning for his age, who endorses chronic low back pain which has waxed and waned in severity over the last several years.  He presents today, however, primarily due to recent onset of severe left lumbosacral pain with radiation into the left hip, lateral thigh, lateral calf, lateral ankle.  The pain began insidiously upon wakening and standing on the left leg approximately 4-5 weeks ago.  He denies known injury or trauma.  He describes constant aching stabbing pain.  The pain is worse with prolonged standing or walking and somewhat relieved with sitting or lying supine.  He denies extremity weakness, numbness, saddle anesthesia, sphincter dysfunction.  He denies right lower extremity symptoms.  He reports no worsening of his chronic low back pain.  Prior to the onset of the pain he was walking 4-5 miles daily.  He notes that he is now using a rolling walker to assist with ambulation.  He has been taking Percocet 5/325 for the past 4 weeks with little, if any, improvement.  He was also prescribed a short course of prednisone.  He has not undergone interventional pain procedures.     He has a history of CAD, status post CABG in 2003, on baby aspirin.  He has a history of atrial fibrillation, on Xarelto.  He has a history of well-controlled hypertension.  He is followed by Dr. Rosales Gallegos.      Patient is status post bilateral L4-5 laminectomy performed on 11/30/2023.

## 2023-11-30 NOTE — HPI
Taco Odonnell Jr is a 79 y/o M with past medical history significant for HTN, PAF, CAD s/p CABG in 2003, and HLD who underwent a lumbar laminectomy earlier today with Dr. Gallegos.  Preoperatively, he was evaluated by Dr. Gallegos in the outpatient setting for c/o recent onset of severe left lumbosacral pain with radiation into the left hip, lateral thigh, lateral calf, lateral ankle which was an acute change from his chronic back pain,eventually leading to the need for an assistive device to ambulate.   MRI obtained and reviewed with surgical intervention recommended.  He is seen this evening in the PACU.  He is awake and alert, hemodynamically stable.  Hospital medicine has been asked to consult for assistance with medical management.

## 2023-11-30 NOTE — BRIEF OP NOTE
Date of procedure:  November 29, 2023     Preoperative diagnosis:    1. Lumbar spinal stenosis, severe    2. Lumbar extradural mass    3. Lumbar radiculopathy     4. Lumbar degenerative disc disease     5. Paroxysmal atrial fibrillation    6. Long-term use of oral anticoagulants     Postoperative diagnosis:    1. Same    Procedures:     1. Bilateral L4-5 laminectomy medial facetectomy    2. Resection of densely adherent extradural mass    Surgeon:  Jhony Gallegos D.O., MBA    Assistant: See medical record     Anesthesia:  General and Exparel     Specimens: L4-5 extradural mass     Complications:  None     The patient tolerated the above procedure well.  He was extubated in the operating room transferred to the recovery room in hemodynamically stable condition.

## 2023-11-30 NOTE — OP NOTE
Date of procedure:  November 29, 2023      Preoperative diagnosis:     1. Lumbar spinal stenosis, severe     2. Lumbar extradural mass     3. Lumbar radiculopathy      4. Lumbar degenerative disc disease      5. Paroxysmal atrial fibrillation     6. Long-term use of oral anticoagulants      Postoperative diagnosis:     1. Same     Procedures:      1. Bilateral L4-5 laminectomy medial facetectomy     2. Resection of densely adherent extradural mass     Surgeon:  Jhony Gallegos D.O., MBA     Assistant: See medical record      Anesthesia:  General and Exparel      Specimens: L4-5 extradural mass      Complications:  None      Operative procedure:  After obtaining informed written consent the patient was brought to the operating room.  Adequate IV access was obtained.  Upon anesthesia induction, he was gently endotracheally intubated without difficulty.  Hartman catheter was placed without difficulty.  The patient was then positioned prone on a Jared table.  The head and face were supported in a prone view head chen.  The eyes were inspected.  The arms were abducted at 90°.  All bony prominences were padded.  Additional padding was placed under the axilla bilaterally.  The lumbar region was prepped and draped in sterile fashion.  Intraoperative fluoroscopy confirmed the L4-5 spinal segment.  IV antibiotics were infused within 1 hour of incision time.  A surgical time-out was performed.  Marcaine with epinephrine was injected at the incision site.  A midline incision was made extending from spinous process of L4-L5.  Subperiosteal dissection was carried out.  A self-retaining retractor was placed.  The microscope was draped in sterile fashion brought to the operative field.  The L4 spinous process was resected.  Using high-speed bur a bilateral L4 laminectomy medial facetectomy was performed.  The ligamentum flavum was  from the dura and resected.  There was severe compression of the thecal sac bilaterally  due to ligamentum flavum.  Along the dorsal surface of the thecal sac a densely adherent fibrous, cystic mass was identified causing compression.  Using microsurgical technique with a combination of micro instruments including dissectors and scissors, the mass was meticulously  from the dura and resected in piecemeal fashion. Meticulous epidural hemostasis was obtained. The spinal canal was inspected for other areas compression and none was identified.  Intraoperative fluoroscopy confirmed decompression extending from the pedicle of L4 to pedicle of L5.  Valsalva was performed and no CSF egression was identified under microscope.  Specimen was sent for pathology.    The wound was copiously irrigated.  A subfascial drain was placed in the epidural space and tunneled remote to the incision site and secured.  Hemostasis was confirmed prior to wound closure.  The muscle was reapproximated with absorbable suture.  The fascia was reapproximated with absorbable suture.  The dermis was reapproximated with surgical staples.  A sterile surgical dressing was applied.  Drain was connected to full MILTON suction.  The patient was carefully positioned supine on a stretcher.  He was extubated in the operating room transferred to the recovery room in hemodynamically stable condition.  Sponge and needle counts were correct.

## 2023-11-30 NOTE — PLAN OF CARE
anastacia: Impaired Wound Healing  Goal: Optimal Wound Healing  Outcome: Ongoing, Progressing     Problem: Pain Acute  Goal: Acceptable Pain Control and Functional Ability  Outcome: Ongoing, Progressing     Problem: Fall Injury Risk  Goal: Absence of Fall and Fall-Related Injury  Outcome: Ongoing, Progressing  Hartman catheter was D/C'd, pt got up and ambulated to bathroom and back to bed, tolerated well.

## 2023-12-01 ENCOUNTER — OFFICE VISIT (OUTPATIENT)
Dept: NEUROSURGERY | Facility: CLINIC | Age: 78
End: 2023-12-01
Payer: MEDICARE

## 2023-12-01 ENCOUNTER — TELEPHONE (OUTPATIENT)
Dept: NEUROSURGERY | Facility: CLINIC | Age: 78
End: 2023-12-01

## 2023-12-01 VITALS
OXYGEN SATURATION: 96 % | BODY MASS INDEX: 26.34 KG/M2 | TEMPERATURE: 98 F | SYSTOLIC BLOOD PRESSURE: 155 MMHG | WEIGHT: 184 LBS | RESPIRATION RATE: 18 BRPM | HEART RATE: 72 BPM | HEIGHT: 70 IN | DIASTOLIC BLOOD PRESSURE: 63 MMHG

## 2023-12-01 DIAGNOSIS — Z98.890 STATUS POST LAMINECTOMY: Primary | ICD-10-CM

## 2023-12-01 PROCEDURE — 99024 POSTOP FOLLOW-UP VISIT: CPT | Mod: S$PBB,ICN,, | Performed by: HEALTH CARE PROVIDER

## 2023-12-01 PROCEDURE — 99024 PR POST-OP FOLLOW-UP VISIT: ICD-10-PCS | Mod: S$PBB,ICN,, | Performed by: HEALTH CARE PROVIDER

## 2023-12-01 RX ORDER — ONDANSETRON 4 MG/1
4 TABLET, ORALLY DISINTEGRATING ORAL EVERY 8 HOURS PRN
Qty: 5 TABLET | Refills: 0 | Status: SHIPPED | OUTPATIENT
Start: 2023-12-01

## 2023-12-01 NOTE — TELEPHONE ENCOUNTER
----- Message from Tamra Sofia sent at 12/1/2023  8:08 AM CST -----  Contact: self  Type: Sooner Appointment Request        Caller is requesting a sooner appointment. Caller declined first available appointment listed below. Caller will not accept being placed on the waitlist and is requesting a message be sent to doctor.        Name of Caller: Patient  Best Call Back Number: [phone]  Additional Information: Pt states he was told to come in to have  a drainage system removed didn't get a  time or date just had a surgery on Wednesday plz reach out to this pt today before the weekend. Thanks

## 2023-12-01 NOTE — DISCHARGE SUMMARY
[facility]/Surg  Neurosurgery  Discharge Summary      Patient Name: Taco Odonnell Jr  MRN: [id number]  Admission Date: 11/29/2023  Hospital Length of Stay: 0 days  Discharge Date and Time: 11/30/2023  4:27 PM  Attending Physician: Samanta att. providers found   Discharging Provider: SONJA RANKIN PA-C  Primary Care Provider: Rosales Gallegos MD    HPI:   HPI:  This is a very pleasant 77-year-old gentleman, high functioning for his age, who endorses chronic low back pain which has waxed and waned in severity over the last several years.  He presents today, however, primarily due to recent onset of severe left lumbosacral pain with radiation into the left hip, lateral thigh, lateral calf, lateral ankle.  The pain began insidiously upon wakening and standing on the left leg approximately 4-5 weeks ago.  He denies known injury or trauma.  He describes constant aching stabbing pain.  The pain is worse with prolonged standing or walking and somewhat relieved with sitting or lying supine.  He denies extremity weakness, numbness, saddle anesthesia, sphincter dysfunction.  He denies right lower extremity symptoms.  He reports no worsening of his chronic low back pain.  Prior to the onset of the pain he was walking 4-5 miles daily.  He notes that he is now using a rolling walker to assist with ambulation.  He has been taking Percocet 5/325 for the past 4 weeks with little, if any, improvement.  He was also prescribed a short course of prednisone.  He has not undergone interventional pain procedures.     He has a history of CAD, status post CABG in 2003, on baby aspirin.  He has a history of atrial fibrillation, on Xarelto.  He has a history of well-controlled hypertension.  He is followed by Dr. Rosales Gallegos.      Patient is status post bilateral L4-5 laminectomy performed on 11/30/2023.    Procedure(s) (LRB):  LAMINECTOMY, SPINE (N/A)     Hospital Course: 11/30:  No acute events overnight.  Afebrile.  Vital signs  stable.  Hemoglobin 12.6.  Patient found sitting at the side of the bed, awake and alert.  He reports mild incisional pain.  He reports improvement in left leg pain and numbness since surgery.  He was able to stand without assistance during encounter.  Denies new weakness, numbness or tingling.  Tolerating diet.  Voiding spontaneously.  Reports flatulence.  MILTON drain x1 in place with 235 mL of output overnight.    Neurosurgery Physical Exam  General: well developed, well nourished, no distress.   Head: normocephalic, atraumatic  Neck: No tracheal deviation.   Neurologic: Alert and oriented. Thought content appropriate.  GCS: E4 V5 M6; Total: 15  Pulmonary: normal respirations, no signs of respiratory distress  Skin: Skin is warm, dry and intact.     Sensory: intact to light touch throughout  Motor Strength:  Bilateral upper extremity strength 5/5 throughout.  Bilateral lower extremity strength 5/5 throughout.     Posterior lumbar incision:  Incision covered with dry bandage. No surrounding erythema or edema. No drainage from incision. No palpable hematoma or underlying fluid collection.  MILTON drain x1 in place.    Goals of Care Treatment Preferences:         Consults:     Significant Diagnostic Studies: Labs: BMP:   Recent Labs   Lab 11/30/23  0437   *      K 3.8      CO2 23   BUN 8   CREATININE 0.7   CALCIUM 8.6*    and CMP   Recent Labs   Lab 11/30/23  0437      K 3.8      CO2 23   *   BUN 8   CREATININE 0.7   CALCIUM 8.6*   ANIONGAP 10         Pending Diagnostic Studies:       Procedure Component Value Units Date/Time    Specimen to Pathology - Surgery [[id number]] Collected: 11/29/23 0984    Order Status: Sent Lab Status: No result     Specimen: Tissue           Final Active Diagnoses:    Diagnosis Date Noted POA    PRINCIPAL PROBLEM:  Lumbar stenosis [M48.061] 11/29/2023 Yes    Status post laminectomy [Z98.890] 11/30/2023 Not Applicable    Paroxysmal atrial fibrillation  [I48.0] 03/11/2021 Yes    Coronary artery disease [I25.10]  Yes    Benign hypertension [I10]  Yes      Problems Resolved During this Admission:      Discharged Condition: good     Disposition: Home or Self Care    Follow Up:   Follow-up Information       Jhony Gallegos DO. Go on 12/14/2023.    Specialty: Neurosurgery  Why: at 9 AM for post op visit  Contact information:  [address] [place] [address] [address]  [facility] [address]  [phone]               [facility] Follow up.    Specialties: Home Health Services, Home Therapy Services, Home Living Aide Services  Why: home health will see you tomorrow at home address  Contact information:  [address] [place] [id number]  [phone]                         Patient Instructions:      Ambulatory referral/consult to Home Health   Standing Status: Future   Referral Priority: Routine Referral Type: Home Health   Referral Reason: Specialty Services Required   Requested Specialty: Home Health Services   Number of Visits Requested: 1     Activity as tolerated     Medications:  Reconciled Home Medications:      Medication List        START taking these medications      methocarbamoL 750 MG Tab  Commonly known as: ROBAXIN  Take 1 tablet (750 mg total) by mouth 4 (four) times daily. for 21 days     senna-docusate 8.6-50 mg 8.6-50 mg per tablet  Commonly known as: SENNA PLUS  Take 2 tablets by mouth nightly as needed for Constipation.     sulfamethoxazole-trimethoprim 800-160mg 800-160 mg Tab  Commonly known as: BACTRIM DS  Take 1 tablet by mouth 2 (two) times daily. for 7 days            CHANGE how you take these medications      oxyCODONE-acetaminophen  mg per tablet  Commonly known as: PERCOCET  Take 1 tablet by mouth every 6 (six) hours as needed for Pain.  What changed:   how much to take  how to take this  when to take this  reasons to take this  Another medication with the same name was removed. Continue taking this medication,  and follow the directions you see here.            CONTINUE taking these medications      hydroCHLOROthiazide 12.5 MG Tab  Commonly known as: HYDRODIURIL  Take 1 tablet (12.5 mg total) by mouth every morning.     sotaloL 80 MG tablet  Commonly known as: BETAPACE  Take 1 tablet (80 mg total) by mouth 2 (two) times daily.     terazosin 5 MG capsule  Commonly known as: HYTRIN  Take 1 capsule (5 mg total) by mouth every evening.            STOP taking these medications      aspirin 81 MG EC tablet  Commonly known as: ECOTRIN     coenzyme Q10 200 mg capsule     FLAXSEED OIL ORAL     multivitamin capsule     mupirocin 2 % ointment  Commonly known as: BACTROBAN     predniSONE 2.5 MG tablet  Commonly known as: DELTASONE     predniSONE 20 MG tablet  Commonly known as: DELTASONE     predniSONE 5 MG tablet  Commonly known as: DELTASONE     REPATHA SURECLICK 140 mg/mL Pnij  Generic drug: evolocumab     rivaroxaban 20 mg Tab  Commonly known as: XARELLURDES RANKIN PA-C  Neurosurgery  [facility]/Surg

## 2023-12-01 NOTE — PROGRESS NOTES
Mr. Odonnell presented to clinic today for drain removal.  Patient reported 25 mL of drain output on 11/30/2023 at 6:30 p.m. after discharge.  During encounter, patient had approximately 5ml serosanguinous fluid present.  He reports he did not empty drain since 630 p.m. on last night.  Drain was removed and a clean and dry dressing applied. Pt tolerated well and ambulated from clinic with walker assistance.

## 2023-12-01 NOTE — PROGRESS NOTES
12/1/23 Very pleased with care given. Stated all staff were supportive and kind. Has read and understand all discharge instructions. [facility] has called but he did not know why they want to call and would not schedule them until after MD office appt. Today.  Will discuss with Dr. Gallegos or nurse Bull.

## 2023-12-04 PROCEDURE — G0180 MD CERTIFICATION HHA PATIENT: HCPCS | Mod: ,,, | Performed by: NEUROLOGICAL SURGERY

## 2023-12-07 ENCOUNTER — TELEPHONE (OUTPATIENT)
Dept: NEUROSURGERY | Facility: CLINIC | Age: 78
End: 2023-12-07
Payer: MEDICARE

## 2023-12-07 DIAGNOSIS — Z98.890 STATUS POST LUMBAR SURGERY: Primary | ICD-10-CM

## 2023-12-07 NOTE — TELEPHONE ENCOUNTER
Returned call to Chiara with Home health. She reports pt was sent with staple removal kit. She asked if she should remove staples. Informed that staples should remain in until he is evaluated in clinic by Dr. Gallegos at upcoming appt.

## 2023-12-07 NOTE — TELEPHONE ENCOUNTER
----- Message from Stacey M Lefort sent at 12/7/2023 11:15 AM CST -----  Chiara from [facility] needs a callback at [phone]. Thank you.

## 2023-12-14 ENCOUNTER — OFFICE VISIT (OUTPATIENT)
Dept: NEUROSURGERY | Facility: CLINIC | Age: 78
End: 2023-12-14
Payer: MEDICARE

## 2023-12-14 VITALS
WEIGHT: 184 LBS | HEART RATE: 66 BPM | SYSTOLIC BLOOD PRESSURE: 140 MMHG | BODY MASS INDEX: 26.34 KG/M2 | HEIGHT: 70 IN | DIASTOLIC BLOOD PRESSURE: 80 MMHG

## 2023-12-14 DIAGNOSIS — Z98.890 STATUS POST LUMBAR SURGERY: Primary | ICD-10-CM

## 2023-12-14 PROCEDURE — 99024 POSTOP FOLLOW-UP VISIT: CPT | Mod: POP,,, | Performed by: NEUROLOGICAL SURGERY

## 2023-12-14 PROCEDURE — 99024 PR POST-OP FOLLOW-UP VISIT: ICD-10-PCS | Mod: POP,,, | Performed by: NEUROLOGICAL SURGERY

## 2023-12-21 ENCOUNTER — TELEPHONE (OUTPATIENT)
Dept: NEUROSURGERY | Facility: CLINIC | Age: 78
End: 2023-12-21
Payer: MEDICARE

## 2023-12-21 NOTE — TELEPHONE ENCOUNTER
----- Message from Stacey M Lefort sent at 12/21/2023 12:51 PM CST -----  Mohini from [facility] is requesting a callback at [phone]. He has decided to use [employer] and their number is [phone]. Thank you.

## 2023-12-26 DIAGNOSIS — I10 BENIGN HYPERTENSION: ICD-10-CM

## 2023-12-26 DIAGNOSIS — I48.0 PAROXYSMAL ATRIAL FIBRILLATION: ICD-10-CM

## 2023-12-26 DIAGNOSIS — I25.10 CORONARY ARTERY DISEASE INVOLVING NATIVE CORONARY ARTERY OF NATIVE HEART WITHOUT ANGINA PECTORIS: ICD-10-CM

## 2023-12-26 DIAGNOSIS — Z78.9 STATIN INTOLERANCE: ICD-10-CM

## 2023-12-27 ENCOUNTER — EXTERNAL HOME HEALTH (OUTPATIENT)
Dept: HOME HEALTH SERVICES | Facility: HOSPITAL | Age: 78
End: 2023-12-27
Payer: MEDICARE

## 2023-12-27 RX ORDER — RIVAROXABAN 20 MG/1
20 TABLET, FILM COATED ORAL
Qty: 90 TABLET | Refills: 0 | OUTPATIENT
Start: 2023-12-27

## 2023-12-27 NOTE — TELEPHONE ENCOUNTER
----- Message from Juan Thompson sent at 12/27/2023  2:19 PM CST -----  Contact: Self  Type: Needs Medical Advice  Who Called:  Patient    Pharmacy name and phone #:    [facility] - [place], [place] - [address]  [address]  [place] [address]  Phone: [phone] Fax: [phone]    Best Call Back Number: [phone]   Additional Information:  Called to speak with office regarding rivaroxaban 20 mg denial. Please call pt.

## 2024-01-04 ENCOUNTER — LAB VISIT (OUTPATIENT)
Dept: LAB | Facility: HOSPITAL | Age: 79
End: 2024-01-04
Attending: INTERNAL MEDICINE
Payer: MEDICARE

## 2024-01-04 DIAGNOSIS — I10 ESSENTIAL HYPERTENSION, MALIGNANT: Primary | ICD-10-CM

## 2024-01-04 DIAGNOSIS — D50.0 IRON DEFICIENCY ANEMIA SECONDARY TO BLOOD LOSS (CHRONIC): ICD-10-CM

## 2024-01-04 DIAGNOSIS — E78.2 MIXED HYPERLIPIDEMIA: ICD-10-CM

## 2024-01-04 DIAGNOSIS — E83.42 HYPOMAGNESEMIA: ICD-10-CM

## 2024-01-04 DIAGNOSIS — E11.9 DIABETES MELLITUS WITHOUT COMPLICATION: ICD-10-CM

## 2024-01-04 DIAGNOSIS — Z79.899 ENCOUNTER FOR LONG-TERM (CURRENT) USE OF OTHER MEDICATIONS: ICD-10-CM

## 2024-01-04 DIAGNOSIS — Z12.5 SPECIAL SCREENING FOR MALIGNANT NEOPLASM OF PROSTATE: ICD-10-CM

## 2024-01-04 LAB
ALBUMIN SERPL BCP-MCNC: 4 G/DL (ref 3.5–5.2)
ALBUMIN/CREAT UR: NORMAL UG/MG (ref 0–30)
ALP SERPL-CCNC: 66 U/L (ref 55–135)
ALT SERPL W/O P-5'-P-CCNC: 13 U/L (ref 10–44)
ANION GAP SERPL CALC-SCNC: 6 MMOL/L (ref 8–16)
AST SERPL-CCNC: 14 U/L (ref 10–40)
BASOPHILS # BLD AUTO: 0.07 K/UL (ref 0–0.2)
BASOPHILS NFR BLD: 1 % (ref 0–1.9)
BILIRUB DIRECT SERPL-MCNC: 0 MG/DL (ref 0.1–0.3)
BILIRUB SERPL-MCNC: 0.8 MG/DL (ref 0.1–1)
BUN SERPL-MCNC: 10 MG/DL (ref 8–23)
CALCIUM SERPL-MCNC: 9.4 MG/DL (ref 8.7–10.5)
CHLORIDE SERPL-SCNC: 105 MMOL/L (ref 95–110)
CHOLEST SERPL-MCNC: 121 MG/DL (ref 120–199)
CHOLEST/HDLC SERPL: 2.5 {RATIO} (ref 2–5)
CO2 SERPL-SCNC: 29 MMOL/L (ref 23–29)
COMPLEXED PSA SERPL-MCNC: 1.52 NG/ML (ref 0–4)
CREAT SERPL-MCNC: 0.7 MG/DL (ref 0.5–1.4)
CREAT UR-MCNC: 125 MG/DL (ref 23–375)
DIFFERENTIAL METHOD BLD: ABNORMAL
EOSINOPHIL # BLD AUTO: 0.2 K/UL (ref 0–0.5)
EOSINOPHIL NFR BLD: 2.8 % (ref 0–8)
ERYTHROCYTE [DISTWIDTH] IN BLOOD BY AUTOMATED COUNT: 12.8 % (ref 11.5–14.5)
EST. GFR  (NO RACE VARIABLE): >60 ML/MIN/1.73 M^2
ESTIMATED AVG GLUCOSE: 105 MG/DL (ref 68–131)
FERRITIN SERPL-MCNC: 321.7 NG/ML (ref 20–300)
GLUCOSE SERPL-MCNC: 101 MG/DL (ref 70–110)
HBA1C MFR BLD: 5.3 % (ref 4.5–6.2)
HCT VFR BLD AUTO: 39.4 % (ref 40–54)
HDLC SERPL-MCNC: 49 MG/DL (ref 40–75)
HDLC SERPL: 40.5 % (ref 20–50)
HGB BLD-MCNC: 13.3 G/DL (ref 14–18)
IMM GRANULOCYTES # BLD AUTO: 0.02 K/UL (ref 0–0.04)
IMM GRANULOCYTES NFR BLD AUTO: 0.3 % (ref 0–0.5)
IRON SERPL-MCNC: 111 UG/DL (ref 45–160)
LDLC SERPL CALC-MCNC: 30.2 MG/DL (ref 63–159)
LYMPHOCYTES # BLD AUTO: 1.4 K/UL (ref 1–4.8)
LYMPHOCYTES NFR BLD: 20.1 % (ref 18–48)
MAGNESIUM SERPL-MCNC: 1.9 MG/DL (ref 1.6–2.6)
MCH RBC QN AUTO: 32.4 PG (ref 27–31)
MCHC RBC AUTO-ENTMCNC: 33.8 G/DL (ref 32–36)
MCV RBC AUTO: 96 FL (ref 82–98)
MICROALBUMIN UR DL<=1MG/L-MCNC: <7 UG/ML
MONOCYTES # BLD AUTO: 0.5 K/UL (ref 0.3–1)
MONOCYTES NFR BLD: 6.9 % (ref 4–15)
NEUTROPHILS # BLD AUTO: 4.9 K/UL (ref 1.8–7.7)
NEUTROPHILS NFR BLD: 68.9 % (ref 38–73)
NONHDLC SERPL-MCNC: 72 MG/DL
NRBC BLD-RTO: 0 /100 WBC
PLATELET # BLD AUTO: 261 K/UL (ref 150–450)
PMV BLD AUTO: 10.3 FL (ref 9.2–12.9)
POTASSIUM SERPL-SCNC: 4 MMOL/L (ref 3.5–5.1)
PROT SERPL-MCNC: 6.8 G/DL (ref 6–8.4)
RBC # BLD AUTO: 4.11 M/UL (ref 4.6–6.2)
SATURATED IRON: 42 % (ref 20–50)
SODIUM SERPL-SCNC: 140 MMOL/L (ref 136–145)
TOTAL IRON BINDING CAPACITY: 265 UG/DL (ref 250–450)
TRANSFERRIN SERPL-MCNC: 189 MG/DL (ref 200–375)
TRIGL SERPL-MCNC: 209 MG/DL (ref 30–150)
WBC # BLD AUTO: 7.13 K/UL (ref 3.9–12.7)

## 2024-01-04 PROCEDURE — 80076 HEPATIC FUNCTION PANEL: CPT | Performed by: INTERNAL MEDICINE

## 2024-01-04 PROCEDURE — 83735 ASSAY OF MAGNESIUM: CPT | Performed by: INTERNAL MEDICINE

## 2024-01-04 PROCEDURE — 36415 COLL VENOUS BLD VENIPUNCTURE: CPT | Performed by: INTERNAL MEDICINE

## 2024-01-04 PROCEDURE — 82043 UR ALBUMIN QUANTITATIVE: CPT | Performed by: INTERNAL MEDICINE

## 2024-01-04 PROCEDURE — 80048 BASIC METABOLIC PNL TOTAL CA: CPT | Performed by: INTERNAL MEDICINE

## 2024-01-04 PROCEDURE — 84153 ASSAY OF PSA TOTAL: CPT | Performed by: INTERNAL MEDICINE

## 2024-01-04 PROCEDURE — 80061 LIPID PANEL: CPT | Performed by: INTERNAL MEDICINE

## 2024-01-04 PROCEDURE — 85025 COMPLETE CBC W/AUTO DIFF WBC: CPT | Performed by: INTERNAL MEDICINE

## 2024-01-04 PROCEDURE — 82728 ASSAY OF FERRITIN: CPT | Performed by: INTERNAL MEDICINE

## 2024-01-04 PROCEDURE — 83036 HEMOGLOBIN GLYCOSYLATED A1C: CPT | Performed by: INTERNAL MEDICINE

## 2024-01-04 PROCEDURE — 83540 ASSAY OF IRON: CPT | Performed by: INTERNAL MEDICINE

## 2024-01-11 ENCOUNTER — DOCUMENT SCAN (OUTPATIENT)
Dept: HOME HEALTH SERVICES | Facility: HOSPITAL | Age: 79
End: 2024-01-11
Payer: MEDICARE

## 2024-01-25 ENCOUNTER — OFFICE VISIT (OUTPATIENT)
Dept: NEUROSURGERY | Facility: CLINIC | Age: 79
End: 2024-01-25
Payer: MEDICARE

## 2024-01-25 VITALS
HEART RATE: 58 BPM | WEIGHT: 184 LBS | SYSTOLIC BLOOD PRESSURE: 142 MMHG | BODY MASS INDEX: 26.34 KG/M2 | DIASTOLIC BLOOD PRESSURE: 77 MMHG | HEIGHT: 70 IN

## 2024-01-25 DIAGNOSIS — Z98.890 STATUS POST LAMINECTOMY: Primary | ICD-10-CM

## 2024-01-25 PROCEDURE — 99024 POSTOP FOLLOW-UP VISIT: CPT | Mod: POP,,, | Performed by: NEUROLOGICAL SURGERY

## 2024-01-25 NOTE — PROGRESS NOTES
Taco is status post bilateral L4-5 laminectomy resection of extradural mass performed uneventfully on November 29, 2023.  He had intractable left leg pain preoperatively.  He reports that his left leg pain has resolved.  He follows up today for routine postoperative evaluation.  He denies weakness or numbness.  He is ambulating without any assistive device.  He is undergoing outpatient physical therapy which he believes is been beneficial.  He denies incision pain.  Rates pain 0/10.  He endorses occasional axial lumbosacral discomfort after standing for several hours.    Exam:  Pleasant, well-groomed  Awake, alert, oriented to person place and time.    Cranial nerves 2-12 grossly intact.    Strength is graded 5/5 in all muscle groups.    Sensation is grossly intact throughout.    Gait and stance are normal  Lumbar incision well healed     Analysis:  He has done very well.  I advised continued outpatient physical therapy until maximal medical benefit is achieved.  We will be glad to see him back as needed.    Taco was seen today for follow-up.    Diagnoses and all orders for this visit:    Status post laminectomy

## 2024-02-21 DIAGNOSIS — I10 BENIGN HYPERTENSION: ICD-10-CM

## 2024-02-21 DIAGNOSIS — I48.0 PAROXYSMAL ATRIAL FIBRILLATION: ICD-10-CM

## 2024-02-21 DIAGNOSIS — I25.10 CORONARY ARTERY DISEASE INVOLVING NATIVE CORONARY ARTERY OF NATIVE HEART WITHOUT ANGINA PECTORIS: ICD-10-CM

## 2024-02-21 DIAGNOSIS — Z78.9 STATIN INTOLERANCE: ICD-10-CM

## 2024-02-21 RX ORDER — EVOLOCUMAB 140 MG/ML
INJECTION, SOLUTION SUBCUTANEOUS
Qty: 6 ML | Refills: 3 | Status: SHIPPED | OUTPATIENT
Start: 2024-02-21

## 2024-04-03 RX ORDER — DOXYCYCLINE 100 MG/1
100 CAPSULE ORAL EVERY 12 HOURS
Qty: 14 CAPSULE | Refills: 0 | Status: SHIPPED | OUTPATIENT
Start: 2024-04-03 | End: 2024-04-10

## 2024-04-03 RX ORDER — PROMETHAZINE HYDROCHLORIDE AND CODEINE PHOSPHATE 6.25; 1 MG/5ML; MG/5ML
5 SOLUTION ORAL EVERY 4 HOURS PRN
Qty: 240 ML | Refills: 0 | Status: SHIPPED | OUTPATIENT
Start: 2024-04-03 | End: 2024-05-03

## 2024-04-03 RX ORDER — PREDNISONE 5 MG/1
5 TABLET ORAL 2 TIMES DAILY
Qty: 10 TABLET | Refills: 0 | Status: SHIPPED | OUTPATIENT
Start: 2024-04-03 | End: 2024-04-08

## 2024-06-07 ENCOUNTER — LAB VISIT (OUTPATIENT)
Dept: LAB | Facility: HOSPITAL | Age: 79
End: 2024-06-07
Attending: INTERNAL MEDICINE
Payer: MEDICARE

## 2024-06-07 DIAGNOSIS — I10 ESSENTIAL HYPERTENSION, MALIGNANT: Primary | ICD-10-CM

## 2024-06-07 DIAGNOSIS — Z79.899 ENCOUNTER FOR LONG-TERM (CURRENT) USE OF OTHER MEDICATIONS: ICD-10-CM

## 2024-06-07 DIAGNOSIS — D50.0 IRON DEFICIENCY ANEMIA SECONDARY TO BLOOD LOSS (CHRONIC): ICD-10-CM

## 2024-06-07 DIAGNOSIS — E78.2 MIXED HYPERLIPIDEMIA: ICD-10-CM

## 2024-06-07 DIAGNOSIS — E11.9 DIABETES MELLITUS WITHOUT COMPLICATION: ICD-10-CM

## 2024-06-07 LAB
ALBUMIN SERPL BCP-MCNC: 4.1 G/DL (ref 3.5–5.2)
ALBUMIN/CREAT UR: NORMAL UG/MG (ref 0–30)
ALP SERPL-CCNC: 77 U/L (ref 55–135)
ALT SERPL W/O P-5'-P-CCNC: 13 U/L (ref 10–44)
ANION GAP SERPL CALC-SCNC: 5 MMOL/L (ref 8–16)
AST SERPL-CCNC: 14 U/L (ref 10–40)
BASOPHILS # BLD AUTO: 0.08 K/UL (ref 0–0.2)
BASOPHILS NFR BLD: 1.1 % (ref 0–1.9)
BILIRUB DIRECT SERPL-MCNC: 0.2 MG/DL (ref 0.1–0.3)
BILIRUB SERPL-MCNC: 1 MG/DL (ref 0.1–1)
BUN SERPL-MCNC: 14 MG/DL (ref 8–23)
CALCIUM SERPL-MCNC: 9.5 MG/DL (ref 8.7–10.5)
CHLORIDE SERPL-SCNC: 104 MMOL/L (ref 95–110)
CHOLEST SERPL-MCNC: 106 MG/DL (ref 120–199)
CHOLEST/HDLC SERPL: 2.1 {RATIO} (ref 2–5)
CO2 SERPL-SCNC: 32 MMOL/L (ref 23–29)
CREAT SERPL-MCNC: 0.8 MG/DL (ref 0.5–1.4)
CREAT UR-MCNC: 106.7 MG/DL (ref 23–375)
DIFFERENTIAL METHOD BLD: ABNORMAL
EOSINOPHIL # BLD AUTO: 0.1 K/UL (ref 0–0.5)
EOSINOPHIL NFR BLD: 1.8 % (ref 0–8)
ERYTHROCYTE [DISTWIDTH] IN BLOOD BY AUTOMATED COUNT: 12.6 % (ref 11.5–14.5)
EST. GFR  (NO RACE VARIABLE): >60 ML/MIN/1.73 M^2
ESTIMATED AVG GLUCOSE: 120 MG/DL (ref 68–131)
FERRITIN SERPL-MCNC: 291.2 NG/ML (ref 20–300)
GLUCOSE SERPL-MCNC: 104 MG/DL (ref 70–110)
HBA1C MFR BLD: 5.8 % (ref 4.5–6.2)
HCT VFR BLD AUTO: 40.7 % (ref 40–54)
HDLC SERPL-MCNC: 51 MG/DL (ref 40–75)
HDLC SERPL: 48.1 % (ref 20–50)
HGB BLD-MCNC: 13.7 G/DL (ref 14–18)
IMM GRANULOCYTES # BLD AUTO: 0.01 K/UL (ref 0–0.04)
IMM GRANULOCYTES NFR BLD AUTO: 0.1 % (ref 0–0.5)
IRON SERPL-MCNC: 145 UG/DL (ref 45–160)
LDLC SERPL CALC-MCNC: 29.6 MG/DL (ref 63–159)
LYMPHOCYTES # BLD AUTO: 1.6 K/UL (ref 1–4.8)
LYMPHOCYTES NFR BLD: 22 % (ref 18–48)
MAGNESIUM SERPL-MCNC: 2.2 MG/DL (ref 1.6–2.6)
MCH RBC QN AUTO: 31.8 PG (ref 27–31)
MCHC RBC AUTO-ENTMCNC: 33.7 G/DL (ref 32–36)
MCV RBC AUTO: 94 FL (ref 82–98)
MICROALBUMIN UR DL<=1MG/L-MCNC: <7 UG/ML
MONOCYTES # BLD AUTO: 0.7 K/UL (ref 0.3–1)
MONOCYTES NFR BLD: 9 % (ref 4–15)
NEUTROPHILS # BLD AUTO: 4.7 K/UL (ref 1.8–7.7)
NEUTROPHILS NFR BLD: 66 % (ref 38–73)
NONHDLC SERPL-MCNC: 55 MG/DL
NRBC BLD-RTO: 0 /100 WBC
PLATELET # BLD AUTO: 258 K/UL (ref 150–450)
PMV BLD AUTO: 10.4 FL (ref 9.2–12.9)
POTASSIUM SERPL-SCNC: 4.6 MMOL/L (ref 3.5–5.1)
PROT SERPL-MCNC: 7.1 G/DL (ref 6–8.4)
RBC # BLD AUTO: 4.31 M/UL (ref 4.6–6.2)
SATURATED IRON: 58 % (ref 20–50)
SODIUM SERPL-SCNC: 141 MMOL/L (ref 136–145)
TOTAL IRON BINDING CAPACITY: 249 UG/DL (ref 250–450)
TRANSFERRIN SERPL-MCNC: 178 MG/DL (ref 200–375)
TRIGL SERPL-MCNC: 127 MG/DL (ref 30–150)
WBC # BLD AUTO: 7.19 K/UL (ref 3.9–12.7)

## 2024-06-07 PROCEDURE — 83735 ASSAY OF MAGNESIUM: CPT | Performed by: INTERNAL MEDICINE

## 2024-06-07 PROCEDURE — 83036 HEMOGLOBIN GLYCOSYLATED A1C: CPT | Performed by: INTERNAL MEDICINE

## 2024-06-07 PROCEDURE — 82570 ASSAY OF URINE CREATININE: CPT | Performed by: INTERNAL MEDICINE

## 2024-06-07 PROCEDURE — 36415 COLL VENOUS BLD VENIPUNCTURE: CPT | Performed by: INTERNAL MEDICINE

## 2024-06-07 PROCEDURE — 85025 COMPLETE CBC W/AUTO DIFF WBC: CPT | Performed by: INTERNAL MEDICINE

## 2024-06-07 PROCEDURE — 80061 LIPID PANEL: CPT | Performed by: INTERNAL MEDICINE

## 2024-06-07 PROCEDURE — 83540 ASSAY OF IRON: CPT | Performed by: INTERNAL MEDICINE

## 2024-06-07 PROCEDURE — 80048 BASIC METABOLIC PNL TOTAL CA: CPT | Performed by: INTERNAL MEDICINE

## 2024-06-07 PROCEDURE — 82043 UR ALBUMIN QUANTITATIVE: CPT | Performed by: INTERNAL MEDICINE

## 2024-06-07 PROCEDURE — 82728 ASSAY OF FERRITIN: CPT | Performed by: INTERNAL MEDICINE

## 2024-06-07 PROCEDURE — 80076 HEPATIC FUNCTION PANEL: CPT | Performed by: INTERNAL MEDICINE

## 2024-07-01 ENCOUNTER — OFFICE VISIT (OUTPATIENT)
Dept: CARDIOLOGY | Facility: CLINIC | Age: 79
End: 2024-07-01
Payer: MEDICARE

## 2024-07-01 VITALS
DIASTOLIC BLOOD PRESSURE: 78 MMHG | BODY MASS INDEX: 27.84 KG/M2 | WEIGHT: 194 LBS | SYSTOLIC BLOOD PRESSURE: 138 MMHG | HEART RATE: 52 BPM | OXYGEN SATURATION: 96 %

## 2024-07-01 DIAGNOSIS — I10 BENIGN HYPERTENSION: ICD-10-CM

## 2024-07-01 DIAGNOSIS — Z98.890 STATUS POST LAMINECTOMY: Primary | ICD-10-CM

## 2024-07-01 DIAGNOSIS — I48.0 PAROXYSMAL ATRIAL FIBRILLATION: ICD-10-CM

## 2024-07-01 DIAGNOSIS — I25.10 CORONARY ARTERY DISEASE INVOLVING NATIVE CORONARY ARTERY OF NATIVE HEART WITHOUT ANGINA PECTORIS: ICD-10-CM

## 2024-07-01 DIAGNOSIS — Z78.9 STATIN INTOLERANCE: ICD-10-CM

## 2024-07-01 PROCEDURE — 99214 OFFICE O/P EST MOD 30 MIN: CPT | Mod: S$PBB,,, | Performed by: INTERNAL MEDICINE

## 2024-07-01 PROCEDURE — 99999 PR PBB SHADOW E&M-EST. PATIENT-LVL III: CPT | Mod: PBBFAC,,, | Performed by: INTERNAL MEDICINE

## 2024-07-01 PROCEDURE — 99213 OFFICE O/P EST LOW 20 MIN: CPT | Mod: PBBFAC,PN | Performed by: INTERNAL MEDICINE

## 2024-07-01 RX ORDER — HYDROCHLOROTHIAZIDE 12.5 MG/1
12.5 TABLET ORAL EVERY MORNING
Qty: 90 TABLET | Refills: 3 | Status: SHIPPED | OUTPATIENT
Start: 2024-07-01 | End: 2025-07-01

## 2024-07-01 RX ORDER — SOTALOL HYDROCHLORIDE 80 MG/1
80 TABLET ORAL 2 TIMES DAILY
Qty: 180 TABLET | Refills: 3 | Status: SHIPPED | OUTPATIENT
Start: 2024-07-01 | End: 2025-07-01

## 2024-07-01 RX ORDER — EVOLOCUMAB 140 MG/ML
140 INJECTION, SOLUTION SUBCUTANEOUS
Qty: 6 ML | Refills: 3 | Status: SHIPPED | OUTPATIENT
Start: 2024-07-01

## 2024-07-01 RX ORDER — TERAZOSIN 5 MG/1
5 CAPSULE ORAL NIGHTLY
Qty: 90 CAPSULE | Refills: 3 | Status: SHIPPED | OUTPATIENT
Start: 2024-07-01 | End: 2025-07-01

## 2024-07-01 NOTE — ASSESSMENT & PLAN NOTE
Lumbar laminectomy apparently is painful post surgery but he has recovered fairly well mobility his increase not much pain with activity noted

## 2024-07-01 NOTE — PROGRESS NOTES
Subjective:    Patient ID:  Taco Odonnell Jr is a 78 y.o. male patient here for evaluation Coronary Artery Disease and Hypertension      History of Present Illness:     Patient is a 78-year-old gentleman with history of known coronary artery disease arterial hypertension, paroxysmal atrial fibrillation had spinal surgery in November.  He was receiving physical therapy and decreased mobility since then up to March of this year.  He was started exercising back again is not full back to his normal level of activity he is able do 3 miles but he is gained weight in the process.  No occurrence of any angina no arm neck or jaw pain no edema PND orthopnea noted.        Review of patient's allergies indicates:   Allergen Reactions    Penicillin g Other (See Comments)     Showed on allergy test        Past Medical History:   Diagnosis Date    Benign hypertension     Coronary artery disease     Glaucoma     Hyperlipidemia LDL goal < 70     Myocardial infarction     Paroxysmal atrial fibrillation      Past Surgical History:   Procedure Laterality Date    CATARACT EXTRACTION      CORONARY ARTERY BYPASS GRAFT  2003    LAMINECTOMY N/A 2023    Procedure: LAMINECTOMY, SPINE;  Surgeon: Jhony Gallegos DO;  Location: [facility];  Service: Neurosurgery;  Laterality: N/A;  Bilateral L4-5 Laminectomy with Resection Extradural Mass    ROTATOR CUFF REPAIR  2010     Social History     Tobacco Use    Smoking status: Former     Current packs/day: 0.00     Average packs/day: 1 pack/day for 40.0 years (40.0 ttl pk-yrs)     Types: Cigarettes     Start date: 1962     Quit date: 2002     Years since quittin.5    Smokeless tobacco: Never   Substance Use Topics    Alcohol use: No    Drug use: No        Review of Systems:    As noted in HPI in addition      REVIEW OF SYSTEMS  CARDIOVASCULAR: No recent chest pain, palpitations, arm, neck, or jaw pain  RESPIRATORY: No recent fever, cough chills, SOB or  congestion  : No blood in the urine  GI: No Nausea, vomiting, constipation, diarrhea, blood, or reflux.  MUSCULOSKELETAL: No myalgias  NEURO: No lightheadedness or dizziness  EYES: No Double vision, blurry, vision or headache              Objective        Vitals:    07/01/24 1348   BP: 138/78   Pulse: (!) 52       LIPIDS - LAST 2   Lab Results   Component Value Date    CHOL 106 (L) 06/07/2024    CHOL 121 01/04/2024    HDL 51 06/07/2024    HDL 49 01/04/2024    LDLCALC 29.6 (L) 06/07/2024    LDLCALC 30.2 (L) 01/04/2024    TRIG 127 06/07/2024    TRIG 209 (H) 01/04/2024    CHOLHDL 48.1 06/07/2024    CHOLHDL 40.5 01/04/2024       CBC - LAST 2  Lab Results   Component Value Date    WBC 7.19 06/07/2024    WBC 7.13 01/04/2024    RBC 4.31 (L) 06/07/2024    RBC 4.11 (L) 01/04/2024    HGB 13.7 (L) 06/07/2024    HGB 13.3 (L) 01/04/2024    HCT 40.7 06/07/2024    HCT 39.4 (L) 01/04/2024    MCV 94 06/07/2024    MCV 96 01/04/2024    MCH 31.8 (H) 06/07/2024    MCH 32.4 (H) 01/04/2024    MCHC 33.7 06/07/2024    MCHC 33.8 01/04/2024    RDW 12.6 06/07/2024    RDW 12.8 01/04/2024     06/07/2024     01/04/2024    MPV 10.4 06/07/2024    MPV 10.3 01/04/2024    GRAN 4.7 06/07/2024    GRAN 66.0 06/07/2024    LYMPH 1.6 06/07/2024    LYMPH 22.0 06/07/2024    MONO 0.7 06/07/2024    MONO 9.0 06/07/2024    BASO 0.08 06/07/2024    BASO 0.07 01/04/2024    NRBC 0 06/07/2024    NRBC 0 01/04/2024       CHEMISTRY & LIVER FUNCTION - LAST 2  Lab Results   Component Value Date     06/07/2024     01/04/2024    K 4.6 06/07/2024    K 4.0 01/04/2024     06/07/2024     01/04/2024    CO2 32 (H) 06/07/2024    CO2 29 01/04/2024    ANIONGAP 5 (L) 06/07/2024    ANIONGAP 6 (L) 01/04/2024    BUN 14 06/07/2024    BUN 10 01/04/2024    CREATININE 0.8 06/07/2024    CREATININE 0.7 01/04/2024     06/07/2024     01/04/2024    CALCIUM 9.5 06/07/2024    CALCIUM 9.4 01/04/2024    MG 2.2 06/07/2024    MG 1.9 01/04/2024     ALBUMIN 4.1 06/07/2024    ALBUMIN 4.0 01/04/2024    PROT 7.1 06/07/2024    PROT 6.8 01/04/2024    ALKPHOS 77 06/07/2024    ALKPHOS 66 01/04/2024    ALT 13 06/07/2024    ALT 13 01/04/2024    AST 14 06/07/2024    AST 14 01/04/2024    BILITOT 1.0 06/07/2024    BILITOT 0.8 01/04/2024        CARDIAC PROFILE - LAST 2  Lab Results   Component Value Date     (H) 04/26/2021        COAGULATION - LAST 2  Lab Results   Component Value Date    INR 1.0 11/17/2023    APTT 26.5 11/17/2023       ENDOCRINE & PSA - LAST 2  Lab Results   Component Value Date    HGBA1C 5.8 06/07/2024    HGBA1C 5.3 01/04/2024    TSH 1.000 12/07/2022    PSA 1.52 01/04/2024    PSA 1.19 10/11/2023        ECHOCARDIOGRAM RESULTS  Results for orders placed during the hospital encounter of 11/13/23    Echo    Interpretation Summary    Left Ventricle: The left ventricle is normal in size. Normal wall thickness. Normal wall motion. There is normal systolic function with a visually estimated ejection fraction of 65 - 70%. There is normal diastolic function.    Right Ventricle: Normal right ventricular cavity size. Wall thickness is normal. Right ventricle wall motion  is normal. Systolic function is normal.    Left Atrium: Left atrium is mildly dilated.    Right Atrium: Right atrium is mildly dilated.    Aortic Valve: The aortic valve is a trileaflet valve. There is mild aortic valve sclerosis. There is mild aortic regurgitation with a centrally directed jet.    Mitral Valve: There is mild regurgitation with a centrally directed jet.    IVC/SVC: Normal venous pressure at 3 mmHg.      CURRENT/PREVIOUS VISIT EKG  Results for orders placed or performed in visit on 11/07/23   IN OFFICE EKG 12-LEAD (to [place])    Collection Time: 11/07/23  4:05 PM    Narrative    Test Reason : R94.31,I48.0,I10,E78.5,Z78.9,    Vent. Rate : 070 BPM     Atrial Rate : 070 BPM     P-R Int : 142 ms          QRS Dur : 078 ms      QT Int : 380 ms       P-R-T Axes : 074 059 099 degrees      QTc Int : 410 ms    Normal sinus rhythm  Possible Left atrial enlargement  ST and T wave abnormality, consider anterolateral ischemia  Abnormal ECG  When compared with ECG of 06-NOV-2023 11:47,  Criteria for Inferior infarct are no longer Present  Confirmed by Roldan Garza MD ([id number]) on 11/9/2023 8:27:37 PM    Referred By:             Confirmed By:Roldan Garza MD     No valid procedures specified.   Results for orders placed during the hospital encounter of 11/13/23    Nuclear Stress - Cardiology Interpreted    Interpretation Summary    Abnormal myocardial perfusion scan.  There is no evidence of reversible ischemia seen.    There is a moderate to severe intensity, moderate sized, equivocal perfusion abnormality that is fixed in the basal to apical inferior and inferolateral wall(s). This finding is equivocal due to diaphragm shadow.    There are no other significant perfusion abnormalities.    There is a  moderate intensity fixed perfusion abnormality in the inferolateral and inferior wall of the left ventricle secondary to diaphragm attenuation.    The gated perfusion images showed an ejection fraction of 57% at rest. The gated perfusion images showed an ejection fraction of 60% post stress. Normal ejection fraction is greater than 53%.    There is normal wall motion at rest and post stress.    LV cavity size is normal at rest and normal at stress.    The ECG portion of the study is abnormal but not diagnostic due to resting ST-T abnormalities.    The patient reported no chest pain during the stress test.    No valid procedures specified.    PHYSICAL EXAM  CONSTITUTIONAL: Well built, well nourished in no apparent distress  NECK: no carotid bruit, no JVD  LUNGS: CTA  CHEST WALL: no tenderness  HEART: regular rate and rhythm, S1, S2 normal, no murmur, click, rub or gallop   ABDOMEN: soft, non-tender; bowel sounds normal; no masses,  no organomegaly  EXTREMITIES: Extremities normal, no edema, no calf tenderness  noted  NEURO: AAO X 3    I HAVE REVIEWED :    The vital signs, nurses notes, and all the pertinent radiology and labs.        Current Outpatient Medications   Medication Instructions    hydroCHLOROthiazide (HYDRODIURIL) 12.5 mg, Oral, Every morning    ondansetron (ZOFRAN-ODT) 4 mg, Oral, Every 8 hours PRN    REPATHA SURECLICK 140 mg, abdominal subcutaneous, Every 14 days    rivaroxaban (XARELTO) 20 mg, Oral, Daily    sotaloL (BETAPACE) 80 mg, Oral, 2 times daily    terazosin (HYTRIN) 5 mg, Oral, Nightly          Assessment & Plan     1. Status post laminectomy  Assessment & Plan:    Lumbar laminectomy apparently is painful post surgery but he has recovered fairly well mobility his increase not much pain with activity noted      2. Paroxysmal atrial fibrillation  Assessment & Plan:   He is stable maintaining regular rhythm continue on sotalol 80 mg p.o. b.I.d., Xarelto 20 mg a day    Orders:  -     terazosin (HYTRIN) 5 MG capsule; Take 1 capsule (5 mg total) by mouth every evening.  Dispense: 90 capsule; Refill: 3  -     sotaloL (BETAPACE) 80 MG tablet; Take 1 tablet (80 mg total) by mouth 2 (two) times daily.  Dispense: 180 tablet; Refill: 3  -     hydroCHLOROthiazide (HYDRODIURIL) 12.5 MG Tab; Take 1 tablet (12.5 mg total) by mouth every morning.  Dispense: 90 tablet; Refill: 3  -     evolocumab (REPATHA SURECLICK) 140 mg/mL PnIj; 1 mL (140 mg total) by abdominal subcutaneous route every 14 (fourteen) days.  Dispense: 6 mL; Refill: 3    3. Coronary artery disease involving native coronary artery of native heart without angina pectoris  Assessment & Plan:  Patient is doing remarkably well from cardiac standpoint continue on present therapy incl.Risk factor modification      Orders:  -     terazosin (HYTRIN) 5 MG capsule; Take 1 capsule (5 mg total) by mouth every evening.  Dispense: 90 capsule; Refill: 3  -     sotaloL (BETAPACE) 80 MG tablet; Take 1 tablet (80 mg total) by mouth 2 (two) times daily.  Dispense: 180  tablet; Refill: 3  -     hydroCHLOROthiazide (HYDRODIURIL) 12.5 MG Tab; Take 1 tablet (12.5 mg total) by mouth every morning.  Dispense: 90 tablet; Refill: 3  -     evolocumab (REPATHA SURECLICK) 140 mg/mL PnIj; 1 mL (140 mg total) by abdominal subcutaneous route every 14 (fourteen) days.  Dispense: 6 mL; Refill: 3    4. Benign hypertension  Assessment & Plan:   Blood pressure is reasonably well controlled at this time on 08/30/2078.  Continue on terazosin 5 mg at nighttime, hydrochlorothiazide 12.5 mg a day, maintain on low-salt diet    Orders:  -     terazosin (HYTRIN) 5 MG capsule; Take 1 capsule (5 mg total) by mouth every evening.  Dispense: 90 capsule; Refill: 3  -     sotaloL (BETAPACE) 80 MG tablet; Take 1 tablet (80 mg total) by mouth 2 (two) times daily.  Dispense: 180 tablet; Refill: 3  -     hydroCHLOROthiazide (HYDRODIURIL) 12.5 MG Tab; Take 1 tablet (12.5 mg total) by mouth every morning.  Dispense: 90 tablet; Refill: 3  -     evolocumab (REPATHA SURECLICK) 140 mg/mL PnIj; 1 mL (140 mg total) by abdominal subcutaneous route every 14 (fourteen) days.  Dispense: 6 mL; Refill: 3    5. Statin intolerance  Assessment & Plan:   Patient has been severe statin intolerance he is currently controlled Repatha.    Orders:  -     terazosin (HYTRIN) 5 MG capsule; Take 1 capsule (5 mg total) by mouth every evening.  Dispense: 90 capsule; Refill: 3  -     sotaloL (BETAPACE) 80 MG tablet; Take 1 tablet (80 mg total) by mouth 2 (two) times daily.  Dispense: 180 tablet; Refill: 3  -     hydroCHLOROthiazide (HYDRODIURIL) 12.5 MG Tab; Take 1 tablet (12.5 mg total) by mouth every morning.  Dispense: 90 tablet; Refill: 3  -     evolocumab (REPATHA SURECLICK) 140 mg/mL PnIj; 1 mL (140 mg total) by abdominal subcutaneous route every 14 (fourteen) days.  Dispense: 6 mL; Refill: 3    Other orders  -     rivaroxaban (XARELTO) 20 mg Tab; Take 1 tablet (20 mg total) by mouth once daily.  Dispense: 90 tablet; Refill:  3          Follow up in about 6 months (around 1/1/2025).

## 2024-07-01 NOTE — ASSESSMENT & PLAN NOTE
Patient is doing remarkably well from cardiac standpoint continue on present therapy incl.Risk factor modification

## 2024-07-01 NOTE — ASSESSMENT & PLAN NOTE
He is stable maintaining regular rhythm continue on sotalol 80 mg p.o. b.I.d., Xarelto 20 mg a day

## 2024-07-01 NOTE — ASSESSMENT & PLAN NOTE
Blood pressure is reasonably well controlled at this time on 08/30/2078.  Continue on terazosin 5 mg at nighttime, hydrochlorothiazide 12.5 mg a day, maintain on low-salt diet

## 2024-07-10 ENCOUNTER — HOSPITAL ENCOUNTER (EMERGENCY)
Facility: HOSPITAL | Age: 79
Discharge: HOME | End: 2024-07-10
Attending: EMERGENCY MEDICINE
Payer: MEDICARE

## 2024-07-10 VITALS
OXYGEN SATURATION: 96 % | DIASTOLIC BLOOD PRESSURE: 81 MMHG | BODY MASS INDEX: 27.26 KG/M2 | WEIGHT: 190 LBS | TEMPERATURE: 98 F | SYSTOLIC BLOOD PRESSURE: 104 MMHG | RESPIRATION RATE: 18 BRPM | HEART RATE: 87 BPM

## 2024-07-10 DIAGNOSIS — L02.31 ABSCESS OF BUTTOCK, LEFT: Primary | ICD-10-CM

## 2024-07-10 PROCEDURE — 10060 I&D ABSCESS SIMPLE/SINGLE: CPT

## 2024-07-10 PROCEDURE — 99282 EMERGENCY DEPT VISIT SF MDM: CPT

## 2024-07-10 RX ORDER — DOXYCYCLINE 100 MG/1
100 CAPSULE ORAL 2 TIMES DAILY
Qty: 14 CAPSULE | Refills: 0 | Status: SHIPPED | OUTPATIENT
Start: 2024-07-10 | End: 2024-07-17

## 2024-07-10 RX ORDER — LIDOCAINE HYDROCHLORIDE 10 MG/ML
5 INJECTION, SOLUTION EPIDURAL; INFILTRATION; INTRACAUDAL; PERINEURAL
Status: DISCONTINUED | OUTPATIENT
Start: 2024-07-10 | End: 2024-07-10 | Stop reason: HOSPADM

## 2024-07-10 NOTE — ED PROVIDER NOTES
Encounter Date: 7/10/2024       History     Chief Complaint   Patient presents with    Abscess     Abscess on left buttock - sent by MD to be drained     78-year-old male past medical history of hypertension, coronary artery disease, previous myocardial infarction presents to the emergency department for an abscess to his left buttock. Patient states he 1st noticed a small bump approximately 2-3 weeks ago and that it progressively got larger. Patient did not realize until 2 days ago how large it actually was. He was seen by his PCP here at [facility] today and was told to come down to the emergency department to have it drained. Patient reports that it is painful to the touch, with walking, and with sitting. He denies fevers or drainage. States that this has happened to him 1 time previously in the exact same spot but it was not as large and it drained on its own.  He is allergic to penicillin.        Review of patient's allergies indicates:   Allergen Reactions    Penicillin g Other (See Comments)     Showed on allergy test      Past Medical History:   Diagnosis Date    Benign hypertension     Coronary artery disease     Glaucoma     Hyperlipidemia LDL goal < 70     Myocardial infarction 2001    Paroxysmal atrial fibrillation      Past Surgical History:   Procedure Laterality Date    CATARACT EXTRACTION      CORONARY ARTERY BYPASS GRAFT  01/01/2003    LAMINECTOMY N/A 11/29/2023    Procedure: LAMINECTOMY, SPINE;  Surgeon: Jhony Gallegos DO;  Location: [facility] OR;  Service: Neurosurgery;  Laterality: N/A;  Bilateral L4-5 Laminectomy with Resection Extradural Mass    ROTATOR CUFF REPAIR  01/01/2010     Family History   Problem Relation Name Age of Onset    Heart attack Mother      Hyperlipidemia Mother      Dementia Father      Cancer Father          skin    Heart attack Sister      Hyperlipidemia Sister       Social History     Tobacco Use    Smoking status: Former     Current packs/day: 0.00     Average packs/day: 1  pack/day for 40.0 years (40.0 ttl pk-yrs)     Types: Cigarettes     Start date: 1962     Quit date: 2002     Years since quittin.5    Smokeless tobacco: Never   Substance Use Topics    Alcohol use: No    Drug use: No     Review of Systems   Constitutional: Negative.    Respiratory: Negative.     Cardiovascular: Negative.    Musculoskeletal: Negative.    Skin:         Abscess on his left buttocks       Physical Exam     Initial Vitals [07/10/24 1522]   BP Pulse Resp Temp SpO2   104/81 87 18 98 °F (36.7 °C) 96 %      MAP       --         Physical Exam    Vitals reviewed.  Constitutional: He appears well-developed and well-nourished. He is not diaphoretic. No distress.   HENT:   Mouth/Throat: Oropharynx is clear and moist.   Eyes: Conjunctivae and EOM are normal.   Neck:   Normal range of motion.  Cardiovascular:  Normal rate, regular rhythm, normal heart sounds and intact distal pulses.           Pulmonary/Chest: Breath sounds normal.   Musculoskeletal:      Cervical back: Normal range of motion.     Skin: Skin is warm. Capillary refill takes less than 2 seconds.        Approximately 3.5 cm round fluctuant mass on the left buttocks inside the intergluteal cleft with significant induration and tenderness to the touch         ED Course   I & D - Incision and Drainage    Date/Time: 7/10/2024 6:27 PM  Location procedure was performed: [facility] EMERGENCY DEPARTMENT    Performed by: Harper Smith PA-C  Authorized by: Shawn Mota MD  Consent Done: Yes  Consent: Verbal consent obtained.  Consent given by: patient  Patient understanding: patient states understanding of the procedure being performed  Type: abscess  Body area: anogenital  Anesthesia: local infiltration  Description of findings: Pus and a thickened chunky white discharge   Complications: No  Specimens: No  Implants: No        Labs Reviewed - No data to display       Imaging Results    None          Medications   LIDOcaine (PF) 10 mg/ml (1%)  injection 50 mg (has no administration in time range)     Medical Decision Making  78-year-old male past medical history of coronary artery disease, AFib, previous MI presents to the emergency department for abscess on the left buttocks.  Considerations include but not limited to pilonidal cyst, abscess, anorectal abscess, anal fistula. Upon arrival patient was seen in the triage room and lesion was visualized. Lesion is located in the intergluteal cleft on the left buttocks. Location does not correlate with a pilonidal cyst or an anorectal abscess.  Imaging is not medically necessary.  Surrounding area is erythematous, indurated, and very tender to the touch.  Approximally 3.5 cm round lesion that is very fluctuant. The area was prepped with Betadine and local anesthetic was injected into the surrounding area and the wound. The mass was drained and contained pus and a thickened chunky white discharge. This is the 2nd occurrence in the past 2 years although the 1st resolved on its own.  Patient experienced much relief after the drainage.  Wound was able to be completely drained.  Wound was then packed and patient was informed to return in 2 days to his primary care provider or to the emergency department to have it removed.  Patient was given antibiotics given this is the 2nd recurrence and the area is inflamed and infected. Patient verbalized understanding of the plan.  Plan was also discussed with my attending and all questions were answered at the bedside.    Risk  Prescription drug management.              Attending Attestation:     Physician Attestation Statement for NP/PA:   I personally made/approved the management plan and take responsibility for the patient management.    Other NP/PA Attestation Additions:    History of Present Illness: 78-year-old male presents for an abscess to the buttock.    Medical Decision Making: Initial differential diagnosis included but not limited to cyst, cellulitis, and  abscess.  I am in agreement with the physician assistant's  assessment, treatment, and plan of care.                                    Clinical Impression:  Final diagnoses:  [L02.31] Abscess of buttock, left (Primary)          ED Disposition Condition    Discharge Stable          ED Prescriptions       Medication Sig Dispense Start Date End Date Auth. Provider    doxycycline (VIBRAMYCIN) 100 MG Cap Take 1 capsule (100 mg total) by mouth 2 (two) times daily. for 7 days 14 capsule 7/10/2024 7/17/2024 Harper Smith PA-C          Follow-up Information       Follow up With Specialties Details Why Contact Info    Rosales Gallegos MD Internal Medicine Call   [address]  [address]  [place] [address]  [phone]               Harper Smith PA-C  07/10/24 1821       Harper Smith PA-C  07/10/24 1830       Shawn Mota MD  07/10/24 1840

## 2024-07-10 NOTE — DISCHARGE INSTRUCTIONS
Follow up with PCP, emergency room, or urgent care in 2 days to have the packing removed. Please return to the emergency department if you develop a fever, worsening pain, increased swelling. You may experience continuous draining while the packing is in place.

## 2024-07-12 ENCOUNTER — HOSPITAL ENCOUNTER (EMERGENCY)
Facility: HOSPITAL | Age: 79
Discharge: HOME | End: 2024-07-12
Attending: EMERGENCY MEDICINE
Payer: MEDICARE

## 2024-07-12 VITALS
DIASTOLIC BLOOD PRESSURE: 85 MMHG | SYSTOLIC BLOOD PRESSURE: 181 MMHG | HEART RATE: 57 BPM | WEIGHT: 190 LBS | TEMPERATURE: 98 F | BODY MASS INDEX: 27.2 KG/M2 | OXYGEN SATURATION: 96 % | RESPIRATION RATE: 18 BRPM | HEIGHT: 70 IN

## 2024-07-12 DIAGNOSIS — Z51.89 WOUND CHECK, ABSCESS: Primary | ICD-10-CM

## 2024-07-12 PROCEDURE — 99281 EMR DPT VST MAYX REQ PHY/QHP: CPT

## 2024-07-12 NOTE — FIRST PROVIDER EVALUATION
Emergency Department TeleTriage Encounter Note      CHIEF COMPLAINT    Chief Complaint   Patient presents with    Wound Check     ABSCESS TO L BUTTOCK , TOLD TO COME BACK FOR CHECK AND PACKING REMOVAL       VITAL SIGNS   Initial Vitals [07/12/24 1144]   BP Pulse Resp Temp SpO2   (!) 181/85 (!) 57 18 98.4 °F (36.9 °C) 96 %      MAP       --            ALLERGIES    Review of patient's allergies indicates:   Allergen Reactions    Penicillin g Other (See Comments)     Showed on allergy test        PROVIDER TRIAGE NOTE  Patient presents with complaint of wound check and packing removal.      Phy:   Constitutional: well nourished, well developed, appearing stated age, NAD        Initial orders will be placed and care will be transferred to an alternate provider when patient is roomed for a full evaluation. Any additional orders and the final disposition will be determined by that provider.        ORDERS  Labs Reviewed - No data to display    ED Orders (720h ago, onward)      None              Virtual Visit Note: The provider triage portion of this emergency department evaluation and documentation was performed via [employer], a HIPAA-compliant telemedicine application, in concert with a tele-presenter in the room. A face to face patient evaluation with one of my colleagues will occur once the patient is placed in an emergency department room.      DISCLAIMER: This note was prepared with [employer] voice recognition transcription software. Garbled syntax, mangled pronouns, and other bizarre constructions may be attributed to that software system.

## 2024-07-13 NOTE — ED PROVIDER NOTES
Encounter Date: 2024       History     Chief Complaint   Patient presents with    Wound Check     ABSCESS TO L BUTTOCK , TOLD TO COME BACK FOR CHECK AND PACKING REMOVAL     HPI    Seen and evaluated.  Presented with a chief complaint of wound check.  Patient recently had incision and drainage of the left buttock.  He presented for wound check, and packing removal.  He reports interval improvement in the wound.  It is less painful.  He denies drainage or bleeding.  He notes he has been taking his antibiotics, and has them to 2 days left.  Denies any additional complaints.  No associated fever.                        Review of patient's allergies indicates:   Allergen Reactions    Penicillin g Other (See Comments)     Showed on allergy test      Past Medical History:   Diagnosis Date    Benign hypertension     Coronary artery disease     Glaucoma     Hyperlipidemia LDL goal < 70     Myocardial infarction     Paroxysmal atrial fibrillation      Past Surgical History:   Procedure Laterality Date    CATARACT EXTRACTION      CORONARY ARTERY BYPASS GRAFT  2003    LAMINECTOMY N/A 2023    Procedure: LAMINECTOMY, SPINE;  Surgeon: Jhony Gallegos DO;  Location: [facility];  Service: Neurosurgery;  Laterality: N/A;  Bilateral L4-5 Laminectomy with Resection Extradural Mass    ROTATOR CUFF REPAIR  2010     Family History   Problem Relation Name Age of Onset    Heart attack Mother      Hyperlipidemia Mother      Dementia Father      Cancer Father          skin    Heart attack Sister      Hyperlipidemia Sister       Social History     Tobacco Use    Smoking status: Former     Current packs/day: 0.00     Average packs/day: 1 pack/day for 40.0 years (40.0 ttl pk-yrs)     Types: Cigarettes     Start date: 1962     Quit date: 2002     Years since quittin.5    Smokeless tobacco: Never   Substance Use Topics    Alcohol use: No    Drug use: No     Review of Systems   Constitutional:  Negative for  fever.   Skin:  Positive for wound.       Physical Exam     Initial Vitals [07/12/24 1144]   BP Pulse Resp Temp SpO2   (!) 181/85 (!) 57 18 98.4 °F (36.9 °C) 96 %      MAP       --         Physical Exam    Constitutional: He appears well-developed and well-nourished.   HENT:   Head: Normocephalic and atraumatic.   Eyes: Conjunctivae are normal.   Cardiovascular:  Normal rate and regular rhythm.             Neurological: He is oriented to person, place, and time.   Skin: Skin is warm and dry.   Psychiatric: His speech is normal.     See media tab for photo    ED Course   Procedures  Labs Reviewed - No data to display       Imaging Results    None          Medications - No data to display  Medical Decision Making  Seen and evaluated presented for wound check.  Clinically improving.  Patient denies any drainage.  When packing was removed, no significant drainage from wound.  He notes it is smaller and less painful.  Will continue antibiotics.  Follow up with primary care.                                      Clinical Impression:  Final diagnoses:  [Z51.89] Wound check, abscess (Primary)          ED Disposition Condition    Discharge Stable          ED Prescriptions    None       Follow-up Information       Follow up With Specialties Details Why Contact Info Additional Information    Rosales Gallegos MD Internal Medicine   [address]  [address]  [place] [address]  [phone]       [facility] - Emergency Dept Emergency Medicine   [address] [id number] [phone] 1st floor             Bob Flower Jr., MD  07/13/24 7535

## 2024-08-26 ENCOUNTER — TELEPHONE (OUTPATIENT)
Dept: CARDIOLOGY | Facility: CLINIC | Age: 79
End: 2024-08-26
Payer: MEDICARE

## 2024-08-26 NOTE — TELEPHONE ENCOUNTER
----- Message from Julia Roselilialeticia sent at 2024 12:37 PM CDT -----  Regardin month f/u  Contact: patient  Type:  Sooner Appointment Request    Caller is requesting a sooner appointment.  Caller declined first available appointment listed below.  Caller will not accept being placed on the waitlist and is requesting a message be sent to doctor.    Name of Caller:  patient  When is the first available appointment?    Symptoms:  6 month f/u  Would the patient rather a call back or a response via [facility]?   [facility] Call Back Number:  [phone]    Additional Information:  pt needs a follow up apt around nov or dec

## 2024-09-09 ENCOUNTER — TELEPHONE (OUTPATIENT)
Dept: FAMILY MEDICINE | Facility: CLINIC | Age: 79
End: 2024-09-09
Payer: MEDICARE

## 2024-09-09 NOTE — TELEPHONE ENCOUNTER
Spoke to pt informed COVID vaccine is not currently available at [facility]. Advised pt to contact local pharmacies

## 2024-09-09 NOTE — TELEPHONE ENCOUNTER
----- Message from Jacinda German sent at 9/9/2024  8:45 AM CDT -----  Regarding: SELF  Type: Patient Call Back     Who called:SELF     What is the request in detail:pt called because he is requesting to have COVID-19 shot done at the office but he would be considered a NP, hasn't been seen at clinic     Can the clinic reply by [facility]?-No     Would the patient rather a call back or a response via [facility]?-call back     Best call back number:[phone]

## 2024-12-10 ENCOUNTER — OFFICE VISIT (OUTPATIENT)
Dept: CARDIOLOGY | Facility: CLINIC | Age: 79
End: 2024-12-10
Payer: MEDICARE

## 2024-12-10 VITALS
WEIGHT: 194 LBS | BODY MASS INDEX: 27.77 KG/M2 | RESPIRATION RATE: 16 BRPM | SYSTOLIC BLOOD PRESSURE: 118 MMHG | OXYGEN SATURATION: 96 % | HEIGHT: 70 IN | HEART RATE: 60 BPM | DIASTOLIC BLOOD PRESSURE: 82 MMHG

## 2024-12-10 DIAGNOSIS — I25.10 CORONARY ARTERY DISEASE INVOLVING NATIVE CORONARY ARTERY OF NATIVE HEART WITHOUT ANGINA PECTORIS: ICD-10-CM

## 2024-12-10 DIAGNOSIS — I10 BENIGN HYPERTENSION: ICD-10-CM

## 2024-12-10 DIAGNOSIS — E78.5 HYPERLIPIDEMIA WITH TARGET LOW DENSITY LIPOPROTEIN (LDL) CHOLESTEROL LESS THAN 70 MG/DL: ICD-10-CM

## 2024-12-10 DIAGNOSIS — Z78.9 STATIN INTOLERANCE: ICD-10-CM

## 2024-12-10 DIAGNOSIS — I48.0 PAROXYSMAL ATRIAL FIBRILLATION: Primary | ICD-10-CM

## 2024-12-10 PROCEDURE — 99214 OFFICE O/P EST MOD 30 MIN: CPT | Mod: S$PBB,,, | Performed by: INTERNAL MEDICINE

## 2024-12-10 PROCEDURE — 99999 PR PBB SHADOW E&M-EST. PATIENT-LVL III: CPT | Mod: PBBFAC,,, | Performed by: INTERNAL MEDICINE

## 2024-12-10 PROCEDURE — 99213 OFFICE O/P EST LOW 20 MIN: CPT | Mod: PBBFAC,PN | Performed by: INTERNAL MEDICINE

## 2024-12-10 RX ORDER — TERAZOSIN 5 MG/1
5 CAPSULE ORAL NIGHTLY
Qty: 90 CAPSULE | Refills: 3 | Status: SHIPPED | OUTPATIENT
Start: 2024-12-10 | End: 2025-12-10

## 2024-12-10 RX ORDER — HYDROCHLOROTHIAZIDE 12.5 MG/1
12.5 TABLET ORAL EVERY MORNING
Qty: 90 TABLET | Refills: 3 | Status: SHIPPED | OUTPATIENT
Start: 2024-12-10 | End: 2025-12-10

## 2024-12-10 RX ORDER — EVOLOCUMAB 140 MG/ML
140 INJECTION, SOLUTION SUBCUTANEOUS
Qty: 6 ML | Refills: 3 | Status: SHIPPED | OUTPATIENT
Start: 2024-12-10 | End: 2025-12-10

## 2024-12-10 RX ORDER — SOTALOL HYDROCHLORIDE 80 MG/1
80 TABLET ORAL 2 TIMES DAILY
Qty: 180 TABLET | Refills: 3 | Status: SHIPPED | OUTPATIENT
Start: 2024-12-10 | End: 2025-12-10

## 2024-12-10 NOTE — ASSESSMENT & PLAN NOTE
Currently maintaining regular rhythm continue on Xarelto 20 mg daily Betapace 80 mg p.o. b.i.d..  Stable on this

## 2024-12-10 NOTE — PROGRESS NOTES
Subjective:    Patient ID:  Taco Odonnell Jr is a 79 y.o. male patient here for evaluation Follow-up      History of Present Illness:     Patient is a 79-year-old gentleman with history of known coronary artery disease dyslipidemia seeking follow-up evaluation.  He has been doing reasonably well he has gained some weight because of his limitations of his effort capacity  After he had laminectomy in the lumbar spine.  Effort capacity has been fairly good is now getting back into exercise program denies having any angina symptoms no arm neck or jaw pain and no significant shortness of breath.        Review of patient's allergies indicates:   Allergen Reactions    Penicillin g Other (See Comments)     Showed on allergy test        Past Medical History:   Diagnosis Date    Benign hypertension     Coronary artery disease     Glaucoma     Hyperlipidemia LDL goal < 70     Myocardial infarction     Paroxysmal atrial fibrillation      Past Surgical History:   Procedure Laterality Date    CATARACT EXTRACTION      CORONARY ARTERY BYPASS GRAFT  2003    LAMINECTOMY N/A 2023    Procedure: LAMINECTOMY, SPINE;  Surgeon: Jhony Gallegos DO;  Location[facility];  Service: Neurosurgery;  Laterality: N/A;  Bilateral L4-5 Laminectomy with Resection Extradural Mass    ROTATOR CUFF REPAIR  2010     Social History     Tobacco Use    Smoking status: Former     Current packs/day: 0.00     Average packs/day: 1 pack/day for 40.0 years (40.0 ttl pk-yrs)     Types: Cigarettes     Start date: 1962     Quit date: 2002     Years since quittin.9    Smokeless tobacco: Never   Substance Use Topics    Alcohol use: No    Drug use: No        Review of Systems:    As noted in HPI in addition      REVIEW OF SYSTEMS  CARDIOVASCULAR: No recent chest pain, palpitations, arm, neck, or jaw pain  RESPIRATORY: No recent fever, cough chills, SOB or congestion  : No blood in the urine  GI: No Nausea, vomiting, constipation,  diarrhea, blood, or reflux.  MUSCULOSKELETAL: No myalgias  NEURO: No lightheadedness or dizziness  EYES: No Double vision, blurry, vision or headache              Objective        Vitals:    12/10/24 1328   BP: 118/82   Pulse: 60   Resp: 16       LIPIDS - LAST 2   Lab Results   Component Value Date    CHOL 106 (L) 06/07/2024    CHOL 121 01/04/2024    HDL 51 06/07/2024    HDL 49 01/04/2024    LDLCALC 29.6 (L) 06/07/2024    LDLCALC 30.2 (L) 01/04/2024    TRIG 127 06/07/2024    TRIG 209 (H) 01/04/2024    CHOLHDL 48.1 06/07/2024    CHOLHDL 40.5 01/04/2024       CBC - LAST 2  Lab Results   Component Value Date    WBC 7.19 06/07/2024    WBC 7.13 01/04/2024    RBC 4.31 (L) 06/07/2024    RBC 4.11 (L) 01/04/2024    HGB 13.7 (L) 06/07/2024    HGB 13.3 (L) 01/04/2024    HCT 40.7 06/07/2024    HCT 39.4 (L) 01/04/2024    MCV 94 06/07/2024    MCV 96 01/04/2024    MCH 31.8 (H) 06/07/2024    MCH 32.4 (H) 01/04/2024    MCHC 33.7 06/07/2024    MCHC 33.8 01/04/2024    RDW 12.6 06/07/2024    RDW 12.8 01/04/2024     06/07/2024     01/04/2024    MPV 10.4 06/07/2024    MPV 10.3 01/04/2024    GRAN 4.7 06/07/2024    GRAN 66.0 06/07/2024    LYMPH 1.6 06/07/2024    LYMPH 22.0 06/07/2024    MONO 0.7 06/07/2024    MONO 9.0 06/07/2024    BASO 0.08 06/07/2024    BASO 0.07 01/04/2024    NRBC 0 06/07/2024    NRBC 0 01/04/2024       CHEMISTRY & LIVER FUNCTION - LAST 2  Lab Results   Component Value Date     06/07/2024     01/04/2024    K 4.6 06/07/2024    K 4.0 01/04/2024     06/07/2024     01/04/2024    CO2 32 (H) 06/07/2024    CO2 29 01/04/2024    ANIONGAP 5 (L) 06/07/2024    ANIONGAP 6 (L) 01/04/2024    BUN 14 06/07/2024    BUN 10 01/04/2024    CREATININE 0.8 06/07/2024    CREATININE 0.7 01/04/2024     06/07/2024     01/04/2024    CALCIUM 9.5 06/07/2024    CALCIUM 9.4 01/04/2024    MG 2.2 06/07/2024    MG 1.9 01/04/2024    ALBUMIN 4.1 06/07/2024    ALBUMIN 4.0 01/04/2024    PROT 7.1 06/07/2024     PROT 6.8 01/04/2024    ALKPHOS 77 06/07/2024    ALKPHOS 66 01/04/2024    ALT 13 06/07/2024    ALT 13 01/04/2024    AST 14 06/07/2024    AST 14 01/04/2024    BILITOT 1.0 06/07/2024    BILITOT 0.8 01/04/2024        CARDIAC PROFILE - LAST 2  Lab Results   Component Value Date     (H) 04/26/2021        COAGULATION - LAST 2  Lab Results   Component Value Date    INR 1.0 11/17/2023    APTT 26.5 11/17/2023       ENDOCRINE & PSA - LAST 2  Lab Results   Component Value Date    HGBA1C 5.8 06/07/2024    HGBA1C 5.3 01/04/2024    TSH 1.000 12/07/2022    PSA 1.52 01/04/2024    PSA 1.19 10/11/2023        ECHOCARDIOGRAM RESULTS  Results for orders placed during the hospital encounter of 11/13/23    Echo    Interpretation Summary    Left Ventricle: The left ventricle is normal in size. Normal wall thickness. Normal wall motion. There is normal systolic function with a visually estimated ejection fraction of 65 - 70%. There is normal diastolic function.    Right Ventricle: Normal right ventricular cavity size. Wall thickness is normal. Right ventricle wall motion  is normal. Systolic function is normal.    Left Atrium: Left atrium is mildly dilated.    Right Atrium: Right atrium is mildly dilated.    Aortic Valve: The aortic valve is a trileaflet valve. There is mild aortic valve sclerosis. There is mild aortic regurgitation with a centrally directed jet.    Mitral Valve: There is mild regurgitation with a centrally directed jet.    IVC/SVC: Normal venous pressure at 3 mmHg.      CURRENT/PREVIOUS VISIT EKG  Results for orders placed or performed in visit on 11/07/23   IN OFFICE EKG 12-LEAD (to [employer])    Collection Time: 11/07/23  4:05 PM    Narrative    Test Reason : R94.31,I48.0,I10,E78.5,Z78.9,    Vent. Rate : 070 BPM     Atrial Rate : 070 BPM     P-R Int : 142 ms          QRS Dur : 078 ms      QT Int : 380 ms       P-R-T Axes : 074 059 099 degrees     QTc Int : 410 ms    Normal sinus rhythm  Possible Left atrial  enlargement  ST and T wave abnormality, consider anterolateral ischemia  Abnormal ECG  When compared with ECG of 06-NOV-2023 11:47,  Criteria for Inferior infarct are no longer Present  Confirmed by Roldan Garza MD ([id number]) on 11/9/2023 8:27:37 PM    Referred By:             Confirmed By:Roldan Garza MD     No valid procedures specified.   Results for orders placed during the hospital encounter of 11/13/23    Nuclear Stress - Cardiology Interpreted    Interpretation Summary    Abnormal myocardial perfusion scan.  There is no evidence of reversible ischemia seen.    There is a moderate to severe intensity, moderate sized, equivocal perfusion abnormality that is fixed in the basal to apical inferior and inferolateral wall(s). This finding is equivocal due to diaphragm shadow.    There are no other significant perfusion abnormalities.    There is a  moderate intensity fixed perfusion abnormality in the inferolateral and inferior wall of the left ventricle secondary to diaphragm attenuation.    The gated perfusion images showed an ejection fraction of 57% at rest. The gated perfusion images showed an ejection fraction of 60% post stress. Normal ejection fraction is greater than 53%.    There is normal wall motion at rest and post stress.    LV cavity size is normal at rest and normal at stress.    The ECG portion of the study is abnormal but not diagnostic due to resting ST-T abnormalities.    The patient reported no chest pain during the stress test.    No valid procedures specified.    PHYSICAL EXAM  CONSTITUTIONAL: Well built, well nourished in no apparent distress  NECK: no carotid bruit, no JVD  LUNGS: CTA  CHEST WALL: no tenderness  HEART: regular rate and rhythm, S1, S2 normal, no murmur, click, rub or gallop   ABDOMEN: soft, non-tender; bowel sounds normal; no masses,  no organomegaly  EXTREMITIES: Extremities normal, no edema, no calf tenderness noted  NEURO: AAO X 3    I HAVE REVIEWED :    The vital  signs, nurses notes, and all the pertinent radiology and labs.        Current Outpatient Medications   Medication Instructions    hydroCHLOROthiazide (HYDRODIURIL) 12.5 mg, Oral, Every morning    ondansetron (ZOFRAN-ODT) 4 mg, Oral, Every 8 hours PRN    REPATHA SURECLICK 140 mg, abdominal subcutaneous, Every 14 days    rivaroxaban (XARELTO) 20 mg, Oral, Daily    sotaloL (BETAPACE) 80 mg, Oral, 2 times daily    terazosin (HYTRIN) 5 mg, Oral, Nightly          Assessment & Plan     1. Paroxysmal atrial fibrillation  Assessment & Plan:  Currently maintaining regular rhythm continue on Xarelto 20 mg daily Betapace 80 mg p.o. b.i.d..  Stable on this    Orders:  -     hydroCHLOROthiazide (HYDRODIURIL) 12.5 MG Tab; Take 1 tablet (12.5 mg total) by mouth every morning.  Dispense: 90 tablet; Refill: 3  -     sotaloL (BETAPACE) 80 MG tablet; Take 1 tablet (80 mg total) by mouth 2 (two) times daily.  Dispense: 180 tablet; Refill: 3  -     terazosin (HYTRIN) 5 MG capsule; Take 1 capsule (5 mg total) by mouth every evening.  Dispense: 90 capsule; Refill: 3  -     evolocumab (REPATHA SURECLICK) 140 mg/mL PnIj; 1 mL (140 mg total) by abdominal subcutaneous route every 14 (fourteen) days.  Dispense: 6 mL; Refill: 3    2. Hyperlipidemia LDL goal < 70  Overview:  Dx updated per 2019 IMO Load    Assessment & Plan:  Patient has severe statin intolerance tolerating Repatha very well lipid levels are well controlled maintain the same along with low-fat low-cholesterol diet      3. Coronary artery disease involving native coronary artery of native heart without angina pectoris  Assessment & Plan:  Coronary artery disease relatively stable no new symptoms noted    Orders:  -     hydroCHLOROthiazide (HYDRODIURIL) 12.5 MG Tab; Take 1 tablet (12.5 mg total) by mouth every morning.  Dispense: 90 tablet; Refill: 3  -     sotaloL (BETAPACE) 80 MG tablet; Take 1 tablet (80 mg total) by mouth 2 (two) times daily.  Dispense: 180 tablet; Refill: 3  -      terazosin (HYTRIN) 5 MG capsule; Take 1 capsule (5 mg total) by mouth every evening.  Dispense: 90 capsule; Refill: 3  -     evolocumab (REPATHA SURECLICK) 140 mg/mL PnIj; 1 mL (140 mg total) by abdominal subcutaneous route every 14 (fourteen) days.  Dispense: 6 mL; Refill: 3    4. Benign hypertension  Assessment & Plan:  Blood pressure is also stable continue on terazosin 5 mg nightly along with hydrochlorothiazide.  Maintain low-salt diet    Orders:  -     hydroCHLOROthiazide (HYDRODIURIL) 12.5 MG Tab; Take 1 tablet (12.5 mg total) by mouth every morning.  Dispense: 90 tablet; Refill: 3  -     sotaloL (BETAPACE) 80 MG tablet; Take 1 tablet (80 mg total) by mouth 2 (two) times daily.  Dispense: 180 tablet; Refill: 3  -     terazosin (HYTRIN) 5 MG capsule; Take 1 capsule (5 mg total) by mouth every evening.  Dispense: 90 capsule; Refill: 3  -     evolocumab (REPATHA SURECLICK) 140 mg/mL PnIj; 1 mL (140 mg total) by abdominal subcutaneous route every 14 (fourteen) days.  Dispense: 6 mL; Refill: 3    5. Statin intolerance  Assessment & Plan:  Severe statin intolerance and relatively stable on Repatha    Orders:  -     hydroCHLOROthiazide (HYDRODIURIL) 12.5 MG Tab; Take 1 tablet (12.5 mg total) by mouth every morning.  Dispense: 90 tablet; Refill: 3  -     sotaloL (BETAPACE) 80 MG tablet; Take 1 tablet (80 mg total) by mouth 2 (two) times daily.  Dispense: 180 tablet; Refill: 3  -     terazosin (HYTRIN) 5 MG capsule; Take 1 capsule (5 mg total) by mouth every evening.  Dispense: 90 capsule; Refill: 3  -     evolocumab (REPATHA SURECLICK) 140 mg/mL PnIj; 1 mL (140 mg total) by abdominal subcutaneous route every 14 (fourteen) days.  Dispense: 6 mL; Refill: 3    Other orders  -     rivaroxaban (XARELTO) 20 mg Tab; Take 1 tablet (20 mg total) by mouth once daily.  Dispense: 90 tablet; Refill: 3          No follow-ups on file.

## 2024-12-10 NOTE — ASSESSMENT & PLAN NOTE
Patient has severe statin intolerance tolerating Repatha very well lipid levels are well controlled maintain the same along with low-fat low-cholesterol diet

## 2024-12-10 NOTE — ASSESSMENT & PLAN NOTE
Blood pressure is also stable continue on terazosin 5 mg nightly along with hydrochlorothiazide.  Maintain low-salt diet

## 2024-12-13 ENCOUNTER — LAB VISIT (OUTPATIENT)
Dept: LAB | Facility: HOSPITAL | Age: 79
End: 2024-12-13
Attending: INTERNAL MEDICINE
Payer: MEDICARE

## 2024-12-13 DIAGNOSIS — E11.9 DIABETES MELLITUS WITHOUT COMPLICATION: ICD-10-CM

## 2024-12-13 DIAGNOSIS — E78.2 MIXED HYPERLIPIDEMIA: ICD-10-CM

## 2024-12-13 DIAGNOSIS — Z79.899 NEED FOR PROPHYLACTIC CHEMOTHERAPY: ICD-10-CM

## 2024-12-13 DIAGNOSIS — E83.42 HYPOMAGNESEMIA: ICD-10-CM

## 2024-12-13 DIAGNOSIS — D50.0 IRON DEFICIENCY ANEMIA SECONDARY TO BLOOD LOSS (CHRONIC): ICD-10-CM

## 2024-12-13 DIAGNOSIS — I10 ESSENTIAL HYPERTENSION, MALIGNANT: Primary | ICD-10-CM

## 2024-12-13 LAB
ALBUMIN SERPL BCP-MCNC: 4.2 G/DL (ref 3.5–5.2)
ALBUMIN/CREAT UR: NORMAL UG/MG (ref 0–30)
ALP SERPL-CCNC: 75 U/L (ref 55–135)
ALT SERPL W/O P-5'-P-CCNC: 16 U/L (ref 10–44)
ANION GAP SERPL CALC-SCNC: 3 MMOL/L (ref 8–16)
AST SERPL-CCNC: 17 U/L (ref 10–40)
BASOPHILS # BLD AUTO: 0.06 K/UL (ref 0–0.2)
BASOPHILS NFR BLD: 0.8 % (ref 0–1.9)
BILIRUB DIRECT SERPL-MCNC: 0.1 MG/DL (ref 0.1–0.3)
BILIRUB SERPL-MCNC: 0.8 MG/DL (ref 0.1–1)
BUN SERPL-MCNC: 12 MG/DL (ref 8–23)
CALCIUM SERPL-MCNC: 9.9 MG/DL (ref 8.7–10.5)
CHLORIDE SERPL-SCNC: 106 MMOL/L (ref 95–110)
CHOLEST SERPL-MCNC: 109 MG/DL (ref 120–199)
CHOLEST/HDLC SERPL: 2.1 {RATIO} (ref 2–5)
CO2 SERPL-SCNC: 32 MMOL/L (ref 23–29)
CREAT SERPL-MCNC: 0.8 MG/DL (ref 0.5–1.4)
CREAT UR-MCNC: 88.2 MG/DL (ref 23–375)
DIFFERENTIAL METHOD BLD: ABNORMAL
EOSINOPHIL # BLD AUTO: 0.2 K/UL (ref 0–0.5)
EOSINOPHIL NFR BLD: 2.3 % (ref 0–8)
ERYTHROCYTE [DISTWIDTH] IN BLOOD BY AUTOMATED COUNT: 12.6 % (ref 11.5–14.5)
EST. GFR  (NO RACE VARIABLE): >60 ML/MIN/1.73 M^2
ESTIMATED AVG GLUCOSE: 123 MG/DL (ref 68–131)
FERRITIN SERPL-MCNC: 182.4 NG/ML (ref 20–300)
GLUCOSE SERPL-MCNC: 104 MG/DL (ref 70–110)
HBA1C MFR BLD: 5.9 % (ref 4.5–6.2)
HCT VFR BLD AUTO: 41.8 % (ref 40–54)
HDLC SERPL-MCNC: 52 MG/DL (ref 40–75)
HDLC SERPL: 47.7 % (ref 20–50)
HGB BLD-MCNC: 13.9 G/DL (ref 14–18)
IMM GRANULOCYTES # BLD AUTO: 0.02 K/UL (ref 0–0.04)
IMM GRANULOCYTES NFR BLD AUTO: 0.3 % (ref 0–0.5)
IRON SERPL-MCNC: 143 UG/DL (ref 45–160)
LDLC SERPL CALC-MCNC: 30.4 MG/DL (ref 63–159)
LYMPHOCYTES # BLD AUTO: 1.4 K/UL (ref 1–4.8)
LYMPHOCYTES NFR BLD: 18.9 % (ref 18–48)
MAGNESIUM SERPL-MCNC: 2.1 MG/DL (ref 1.6–2.6)
MCH RBC QN AUTO: 31.3 PG (ref 27–31)
MCHC RBC AUTO-ENTMCNC: 33.3 G/DL (ref 32–36)
MCV RBC AUTO: 94 FL (ref 82–98)
MICROALBUMIN UR DL<=1MG/L-MCNC: <7 UG/ML
MONOCYTES # BLD AUTO: 0.6 K/UL (ref 0.3–1)
MONOCYTES NFR BLD: 8 % (ref 4–15)
NEUTROPHILS # BLD AUTO: 5.2 K/UL (ref 1.8–7.7)
NEUTROPHILS NFR BLD: 69.7 % (ref 38–73)
NONHDLC SERPL-MCNC: 57 MG/DL
NRBC BLD-RTO: 0 /100 WBC
PLATELET # BLD AUTO: 251 K/UL (ref 150–450)
PMV BLD AUTO: 10.6 FL (ref 9.2–12.9)
POTASSIUM SERPL-SCNC: 5.1 MMOL/L (ref 3.5–5.1)
PROT SERPL-MCNC: 7.4 G/DL (ref 6–8.4)
RBC # BLD AUTO: 4.44 M/UL (ref 4.6–6.2)
SATURATED IRON: 58 % (ref 20–50)
SODIUM SERPL-SCNC: 141 MMOL/L (ref 136–145)
TOTAL IRON BINDING CAPACITY: 248 UG/DL (ref 250–450)
TRANSFERRIN SERPL-MCNC: 177 MG/DL (ref 200–375)
TRIGL SERPL-MCNC: 133 MG/DL (ref 30–150)
WBC # BLD AUTO: 7.47 K/UL (ref 3.9–12.7)

## 2024-12-13 PROCEDURE — 80076 HEPATIC FUNCTION PANEL: CPT | Performed by: INTERNAL MEDICINE

## 2024-12-13 PROCEDURE — 85025 COMPLETE CBC W/AUTO DIFF WBC: CPT | Performed by: INTERNAL MEDICINE

## 2024-12-13 PROCEDURE — 83540 ASSAY OF IRON: CPT | Performed by: INTERNAL MEDICINE

## 2024-12-13 PROCEDURE — 36415 COLL VENOUS BLD VENIPUNCTURE: CPT | Performed by: INTERNAL MEDICINE

## 2024-12-13 PROCEDURE — 82570 ASSAY OF URINE CREATININE: CPT | Performed by: INTERNAL MEDICINE

## 2024-12-13 PROCEDURE — 82728 ASSAY OF FERRITIN: CPT | Performed by: INTERNAL MEDICINE

## 2024-12-13 PROCEDURE — 83036 HEMOGLOBIN GLYCOSYLATED A1C: CPT | Performed by: INTERNAL MEDICINE

## 2024-12-13 PROCEDURE — 83735 ASSAY OF MAGNESIUM: CPT | Performed by: INTERNAL MEDICINE

## 2024-12-13 PROCEDURE — 80048 BASIC METABOLIC PNL TOTAL CA: CPT | Performed by: INTERNAL MEDICINE

## 2024-12-13 PROCEDURE — 80061 LIPID PANEL: CPT | Performed by: INTERNAL MEDICINE

## 2025-04-24 ENCOUNTER — HOSPITAL ENCOUNTER (OUTPATIENT)
Dept: RADIOLOGY | Facility: HOSPITAL | Age: 80
Discharge: HOME | End: 2025-04-24
Attending: INTERNAL MEDICINE
Payer: MEDICARE

## 2025-04-24 DIAGNOSIS — E04.2 NONTOXIC MULTINODULAR GOITER: ICD-10-CM

## 2025-04-24 DIAGNOSIS — R05.3 CHRONIC COUGH: Primary | ICD-10-CM

## 2025-04-24 DIAGNOSIS — R05.3 CHRONIC COUGH: ICD-10-CM

## 2025-04-24 PROCEDURE — 71046 X-RAY EXAM CHEST 2 VIEWS: CPT | Mod: TC

## 2025-04-24 PROCEDURE — 76536 US EXAM OF HEAD AND NECK: CPT | Mod: 26,,, | Performed by: RADIOLOGY

## 2025-04-24 PROCEDURE — 71046 X-RAY EXAM CHEST 2 VIEWS: CPT | Mod: 26,,, | Performed by: RADIOLOGY

## 2025-04-24 PROCEDURE — 76536 US EXAM OF HEAD AND NECK: CPT | Mod: TC

## 2025-04-25 ENCOUNTER — LAB VISIT (OUTPATIENT)
Dept: LAB | Facility: HOSPITAL | Age: 80
End: 2025-04-25
Attending: INTERNAL MEDICINE
Payer: MEDICARE

## 2025-04-25 DIAGNOSIS — E83.42 HYPOMAGNESEMIA: ICD-10-CM

## 2025-04-25 DIAGNOSIS — D50.0 IRON DEFICIENCY ANEMIA SECONDARY TO BLOOD LOSS (CHRONIC): ICD-10-CM

## 2025-04-25 DIAGNOSIS — Z79.899 POLYPHARMACY: ICD-10-CM

## 2025-04-25 DIAGNOSIS — E03.4 IDIOPATHIC ATROPHIC HYPOTHYROIDISM: ICD-10-CM

## 2025-04-25 DIAGNOSIS — E11.9 DIABETES MELLITUS WITHOUT COMPLICATION: ICD-10-CM

## 2025-04-25 DIAGNOSIS — I10 ESSENTIAL HYPERTENSION, MALIGNANT: Primary | ICD-10-CM

## 2025-04-25 DIAGNOSIS — E78.2 MIXED HYPERLIPIDEMIA: ICD-10-CM

## 2025-04-25 DIAGNOSIS — Z12.5 SPECIAL SCREENING, PROSTATE CANCER: ICD-10-CM

## 2025-04-25 LAB
ABSOLUTE EOSINOPHIL (SMH): 0.2 K/UL
ABSOLUTE MONOCYTE (SMH): 0.66 K/UL (ref 0.3–1)
ABSOLUTE NEUTROPHIL COUNT (SMH): 5.3 K/UL (ref 1.8–7.7)
ALBUMIN SERPL-MCNC: 4 G/DL (ref 3.5–5.2)
ALBUMIN/CREAT UR: NORMAL
ALP SERPL-CCNC: 86 UNIT/L (ref 55–135)
ALT SERPL-CCNC: 14 UNIT/L (ref 10–44)
ANION GAP (SMH): 9 MMOL/L (ref 8–16)
AST SERPL-CCNC: 19 UNIT/L (ref 10–40)
BASOPHILS # BLD AUTO: 0.05 K/UL
BASOPHILS NFR BLD AUTO: 0.7 %
BILIRUB DIRECT SERPL-MCNC: 0.2 MG/DL (ref 0.1–0.3)
BILIRUB SERPL-MCNC: 0.8 MG/DL (ref 0.1–1)
BUN SERPL-MCNC: 15 MG/DL (ref 8–23)
CALCIUM SERPL-MCNC: 9.4 MG/DL (ref 8.7–10.5)
CHLORIDE SERPL-SCNC: 103 MMOL/L (ref 95–110)
CHOLEST SERPL-MCNC: 96 MG/DL (ref 120–199)
CHOLEST/HDLC SERPL: 1.8 {RATIO} (ref 2–5)
CO2 SERPL-SCNC: 27 MMOL/L (ref 23–29)
CREAT SERPL-MCNC: 0.8 MG/DL (ref 0.5–1.4)
CREAT UR-MCNC: 83.7 MG/DL (ref 23–375)
ERYTHROCYTE [DISTWIDTH] IN BLOOD BY AUTOMATED COUNT: 12.4 % (ref 11.5–14.5)
FERRITIN SERPL-MCNC: 272.3 NG/ML (ref 20–300)
GFR SERPLBLD CREATININE-BSD FMLA CKD-EPI: >60 ML/MIN/1.73/M2
GLUCOSE SERPL-MCNC: 102 MG/DL (ref 70–110)
HCT VFR BLD AUTO: 40 % (ref 40–54)
HDLC SERPL-MCNC: 52 MG/DL (ref 40–75)
HDLC SERPL: 54.2 % (ref 20–50)
HGB BLD-MCNC: 13.4 GM/DL (ref 14–18)
IMM GRANULOCYTES # BLD AUTO: 0.03 K/UL (ref 0–0.04)
IMM GRANULOCYTES NFR BLD AUTO: 0.4 % (ref 0–0.5)
IRON SATN MFR SERPL: 36 % (ref 20–50)
IRON SERPL-MCNC: 81 UG/DL (ref 45–160)
LDLC SERPL CALC-MCNC: 22.6 MG/DL (ref 63–159)
LYMPHOCYTES # BLD AUTO: 1.22 K/UL (ref 1–4.8)
MAGNESIUM SERPL-MCNC: 2 MG/DL (ref 1.6–2.6)
MCH RBC QN AUTO: 31.2 PG (ref 27–31)
MCHC RBC AUTO-ENTMCNC: 33.5 G/DL (ref 32–36)
MCV RBC AUTO: 93 FL (ref 82–98)
MICROALBUMIN UR-MCNC: <7 UG/ML
NONHDLC SERPL-MCNC: 44 MG/DL
NUCLEATED RBC (/100WBC) (SMH): 0 /100 WBC
PLATELET # BLD AUTO: 242 K/UL (ref 150–450)
PMV BLD AUTO: 10.3 FL (ref 9.2–12.9)
POTASSIUM SERPL-SCNC: 4 MMOL/L (ref 3.5–5.1)
PROT SERPL-MCNC: 7.1 GM/DL (ref 6–8.4)
PSA SERPL-MCNC: 1.57 NG/ML (ref ?–4)
RBC # BLD AUTO: 4.3 M/UL (ref 4.6–6.2)
RELATIVE EOSINOPHIL (SMH): 2.7 % (ref 0–8)
RELATIVE LYMPHOCYTE (SMH): 16.4 % (ref 18–48)
RELATIVE MONOCYTE (SMH): 8.9 % (ref 4–15)
RELATIVE NEUTROPHIL (SMH): 70.9 % (ref 38–73)
SODIUM SERPL-SCNC: 139 MMOL/L (ref 136–145)
T4 FREE SERPL-MCNC: 0.75 NG/DL (ref 0.71–1.51)
TIBC SERPL-MCNC: 225 UG/DL (ref 250–450)
TRANSFERRIN SERPL-MCNC: 161 MG/DL (ref 200–375)
TRIGL SERPL-MCNC: 107 MG/DL (ref 30–150)
TSH SERPL-ACNC: 2.19 UIU/ML (ref 0.34–5.6)
WBC # BLD AUTO: 7.44 K/UL (ref 3.9–12.7)

## 2025-04-25 PROCEDURE — 80061 LIPID PANEL: CPT

## 2025-04-25 PROCEDURE — 85025 COMPLETE CBC W/AUTO DIFF WBC: CPT

## 2025-04-25 PROCEDURE — 83036 HEMOGLOBIN GLYCOSYLATED A1C: CPT

## 2025-04-25 PROCEDURE — 84466 ASSAY OF TRANSFERRIN: CPT

## 2025-04-25 PROCEDURE — 84439 ASSAY OF FREE THYROXINE: CPT

## 2025-04-25 PROCEDURE — 80053 COMPREHEN METABOLIC PANEL: CPT

## 2025-04-25 PROCEDURE — 36415 COLL VENOUS BLD VENIPUNCTURE: CPT

## 2025-04-25 PROCEDURE — 83735 ASSAY OF MAGNESIUM: CPT

## 2025-04-25 PROCEDURE — 82248 BILIRUBIN DIRECT: CPT

## 2025-04-25 PROCEDURE — 84153 ASSAY OF PSA TOTAL: CPT

## 2025-04-25 PROCEDURE — 82728 ASSAY OF FERRITIN: CPT

## 2025-04-25 PROCEDURE — 84443 ASSAY THYROID STIM HORMONE: CPT

## 2025-04-25 PROCEDURE — 82043 UR ALBUMIN QUANTITATIVE: CPT

## 2025-04-26 LAB
EAG (SMH): 114 MG/DL (ref 68–131)
HBA1C MFR BLD: 5.6 % (ref 4.5–6.2)

## 2025-04-28 DIAGNOSIS — R49.0 HOARSENESS: Primary | ICD-10-CM

## 2025-04-28 DIAGNOSIS — D35.1 PARATHYROID ADENOMA: ICD-10-CM

## 2025-04-28 DIAGNOSIS — R05.2 SUBACUTE COUGH: ICD-10-CM

## 2025-04-28 DIAGNOSIS — R59.0 LYMPHADENOPATHY OF RIGHT CERVICAL REGION: ICD-10-CM

## 2025-04-28 DIAGNOSIS — E01.0 THYROMEGALY: ICD-10-CM

## 2025-04-28 DIAGNOSIS — Z87.891 PERSONAL HISTORY OF NICOTINE DEPENDENCE: ICD-10-CM

## 2025-04-28 DIAGNOSIS — E04.2 NONTOXIC MULTINODULAR GOITER: ICD-10-CM

## 2025-04-30 DIAGNOSIS — R49.0 HOARSENESS: Primary | ICD-10-CM

## 2025-04-30 DIAGNOSIS — R59.0 LYMPHADENOPATHY OF HEAD AND NECK REGION: ICD-10-CM

## 2025-04-30 DIAGNOSIS — D35.1 PARATHYROID ADENOMA: ICD-10-CM

## 2025-04-30 DIAGNOSIS — E04.2 NONTOXIC MULTINODULAR GOITER: ICD-10-CM

## 2025-04-30 DIAGNOSIS — Z87.891 PERSONAL HISTORY OF TOBACCO USE: ICD-10-CM

## 2025-05-01 ENCOUNTER — HOSPITAL ENCOUNTER (OUTPATIENT)
Dept: RADIOLOGY | Facility: HOSPITAL | Age: 80
Discharge: HOME | End: 2025-05-01
Attending: INTERNAL MEDICINE
Payer: MEDICARE

## 2025-05-01 DIAGNOSIS — R05.2 SUBACUTE COUGH: ICD-10-CM

## 2025-05-01 DIAGNOSIS — D35.1 PARATHYROID ADENOMA: ICD-10-CM

## 2025-05-01 DIAGNOSIS — E04.2 NONTOXIC MULTINODULAR GOITER: ICD-10-CM

## 2025-05-01 DIAGNOSIS — R49.0 HOARSENESS: ICD-10-CM

## 2025-05-01 DIAGNOSIS — R59.0 LYMPHADENOPATHY OF RIGHT CERVICAL REGION: ICD-10-CM

## 2025-05-01 DIAGNOSIS — R05.3 CHRONIC COUGH: ICD-10-CM

## 2025-05-01 DIAGNOSIS — E01.0 THYROMEGALY: ICD-10-CM

## 2025-05-01 DIAGNOSIS — Z87.891 PERSONAL HISTORY OF NICOTINE DEPENDENCE: ICD-10-CM

## 2025-05-01 PROCEDURE — 70492 CT SFT TSUE NCK W/O & W/DYE: CPT | Mod: TC

## 2025-05-01 PROCEDURE — 70492 CT SFT TSUE NCK W/O & W/DYE: CPT | Mod: 26,,, | Performed by: RADIOLOGY

## 2025-05-01 PROCEDURE — 71250 CT THORAX DX C-: CPT | Mod: 26,,, | Performed by: RADIOLOGY

## 2025-05-01 PROCEDURE — 25500020 PHARM REV CODE 255: Performed by: INTERNAL MEDICINE

## 2025-05-01 PROCEDURE — 71250 CT THORAX DX C-: CPT | Mod: TC

## 2025-05-01 RX ADMIN — IOHEXOL 100 ML: 350 INJECTION, SOLUTION INTRAVENOUS at 08:05

## 2025-05-05 DIAGNOSIS — D38.1 NEOPLASM OF UNCERTAIN BEHAVIOR OF TRACHEA, BRONCHUS, AND LUNG: Primary | ICD-10-CM

## 2025-05-06 ENCOUNTER — TELEPHONE (OUTPATIENT)
Facility: CLINIC | Age: 80
End: 2025-05-06
Payer: MEDICARE

## 2025-05-06 ENCOUNTER — TELEPHONE (OUTPATIENT)
Dept: RADIOLOGY | Facility: HOSPITAL | Age: 80
End: 2025-05-06

## 2025-05-06 DIAGNOSIS — R59.1 LYMPHADENOPATHY: Primary | ICD-10-CM

## 2025-05-06 NOTE — NURSING
Pt scheduled for u/s guided thyroid FNA and lymph node bx.  Pt given arrival date and time of 5/13 at 8:30 am.  Pt instructed to hold aspirin 7 days before procedure and hold xarelto 3 days before procedure (per Dr. Gallegos's signed letter to radiology)    Pt verbalized understanding of above instructions.

## 2025-05-06 NOTE — NURSING
Returned call to patient. He is in need of a consult appointment with Dr. Chaudhari. Patient stated he has paper referral. Sent this information to lung navigator Jong to follow up.

## 2025-05-13 ENCOUNTER — HOSPITAL ENCOUNTER (OUTPATIENT)
Dept: RADIOLOGY | Facility: HOSPITAL | Age: 80
Discharge: HOME | End: 2025-05-13
Attending: INTERNAL MEDICINE
Payer: MEDICARE

## 2025-05-13 DIAGNOSIS — R59.1 LYMPHADENOPATHY: ICD-10-CM

## 2025-05-13 PROCEDURE — 63600175 PHARM REV CODE 636 W HCPCS: Performed by: RADIOLOGY

## 2025-05-13 PROCEDURE — 27000342 US FINE NEEDLE ASPIRATION BIOPSY, FIRST LESION

## 2025-05-13 PROCEDURE — 88341 IMHCHEM/IMCYTCHM EA ADD ANTB: CPT | Mod: TC | Performed by: PATHOLOGY

## 2025-05-13 PROCEDURE — 88173 CYTOPATH EVAL FNA REPORT: CPT | Performed by: INTERNAL MEDICINE

## 2025-05-13 RX ORDER — LIDOCAINE HYDROCHLORIDE 10 MG/ML
INJECTION, SOLUTION EPIDURAL; INFILTRATION; INTRACAUDAL; PERINEURAL
Status: COMPLETED | OUTPATIENT
Start: 2025-05-13 | End: 2025-05-13

## 2025-05-13 RX ADMIN — LIDOCAINE HYDROCHLORIDE 10 ML: 10 INJECTION, SOLUTION EPIDURAL; INFILTRATION; INTRACAUDAL; PERINEURAL at 09:05

## 2025-05-13 NOTE — DISCHARGE INSTRUCTIONS
Medical Imaging Discharge Instructions    Discharge Instructions After:  Thyroid FNA    Diet:  Resume diet as prescribed by your physician    Medications:  ok to restart aspirin and xarelto tomorrow, 5/14/25    Activity:  Rest today    Care of Dressing and Incision:  May change dressing or bandage as needed    Report to M.D. any of the Following:  Any severe pain, new onset pain or worsening symptoms, Excessive bleeding, bruising or swelling, Temperature of 101 degrees or above, and Any sudden shortness of breath or bloody sputum greater than 2 tablespoons    Follow up with your physician regarding the results of this exam. Please feel free to call the radiology department at [phone] for any problems or questions. Ask to speak with the radiology nurse.    Lizz Choudhury RN  Date of Service: 05/13/2025  8:45 AM

## 2025-05-13 NOTE — PLAN OF CARE
Right thyroid Fna and right cervical lymph node bx completed.  Pt phi well.  Gauze and paper tape dressing to neck,no bleeding noted.  Pt given d/c instructions and verbalized understanding.  Pt amb out of dept with steady gait.

## 2025-05-14 DIAGNOSIS — R05.2 SUBACUTE COUGH: Primary | ICD-10-CM

## 2025-05-14 RX ORDER — PROMETHAZINE HYDROCHLORIDE AND CODEINE PHOSPHATE 6.25; 1 MG/5ML; MG/5ML
5 SOLUTION ORAL EVERY 4 HOURS PRN
Qty: 240 ML | Refills: 0 | Status: SHIPPED | OUTPATIENT
Start: 2025-05-14 | End: 2025-05-24

## 2025-05-16 ENCOUNTER — HOSPITAL ENCOUNTER (OUTPATIENT)
Dept: RADIOLOGY | Facility: HOSPITAL | Age: 80
Discharge: HOME | End: 2025-05-16
Attending: INTERNAL MEDICINE
Payer: MEDICARE

## 2025-05-16 DIAGNOSIS — D38.1 NEOPLASM OF UNCERTAIN BEHAVIOR OF TRACHEA, BRONCHUS, AND LUNG: ICD-10-CM

## 2025-05-16 LAB — GLUCOSE SERPL-MCNC: 123 MG/DL (ref 70–110)

## 2025-05-16 PROCEDURE — 78816 PET IMAGE W/CT FULL BODY: CPT | Mod: TC,PN

## 2025-05-16 PROCEDURE — A9552 F18 FDG: HCPCS | Mod: PN | Performed by: INTERNAL MEDICINE

## 2025-05-16 PROCEDURE — 78816 PET IMAGE W/CT FULL BODY: CPT | Mod: 26,PI,, | Performed by: RADIOLOGY

## 2025-05-16 RX ORDER — FLUDEOXYGLUCOSE F18 500 MCI/ML
10 INJECTION INTRAVENOUS
Status: COMPLETED | OUTPATIENT
Start: 2025-05-16 | End: 2025-05-16

## 2025-05-16 RX ADMIN — FLUDEOXYGLUCOSE F-18 10 MILLICURIE: 500 INJECTION INTRAVENOUS at 07:05

## 2025-05-16 NOTE — PROGRESS NOTES
PET Imaging Questionnaire    Are you a Diabetic? Recent Blood Sugar level? No    Are you anemic? Bone Marrow Stimulation Meds? No    Have you had a CT Scan, if so when & where was your last one? Yes -     Have you had a PET Scan, if so when & where was your last one? No    Chemotherapy or currently on Chemotherapy? No    Radiation therapy? No    Surgical History:   Past Surgical History:   Procedure Laterality Date    CATARACT EXTRACTION      CORONARY ARTERY BYPASS GRAFT  01/01/2003    LAMINECTOMY N/A 11/29/2023    Procedure: LAMINECTOMY, SPINE;  Surgeon: Jhony Gallegos DO;  [facility];  Service: Neurosurgery;  Laterality: N/A;  Bilateral L4-5 Laminectomy with Resection Extradural Mass    ROTATOR CUFF REPAIR  01/01/2010        Have you been fasting for at least 6 hours? Yes    Is there any chance you may be pregnant or breastfeeding? No    Assay: 11.57 MCi@:07:38   Injection Site:RT AC    Residual: 1.55 mCi@: 07:41   Technologist: Kike Rivas Injected:10.0 mCi

## 2025-05-20 ENCOUNTER — OFFICE VISIT (OUTPATIENT)
Dept: PULMONOLOGY | Facility: CLINIC | Age: 80
End: 2025-05-20
Payer: MEDICARE

## 2025-05-20 VITALS
HEIGHT: 70 IN | OXYGEN SATURATION: 96 % | SYSTOLIC BLOOD PRESSURE: 123 MMHG | DIASTOLIC BLOOD PRESSURE: 68 MMHG | BODY MASS INDEX: 27.77 KG/M2 | WEIGHT: 194 LBS

## 2025-05-20 DIAGNOSIS — R91.8 LUNG MASS: Primary | ICD-10-CM

## 2025-05-20 PROCEDURE — 99999 PR PBB SHADOW E&M-EST. PATIENT-LVL III: CPT | Mod: PBBFAC,,, | Performed by: STUDENT IN AN ORGANIZED HEALTH CARE EDUCATION/TRAINING PROGRAM

## 2025-05-20 PROCEDURE — 99213 OFFICE O/P EST LOW 20 MIN: CPT | Mod: PBBFAC,PO | Performed by: STUDENT IN AN ORGANIZED HEALTH CARE EDUCATION/TRAINING PROGRAM

## 2025-05-20 PROCEDURE — 99205 OFFICE O/P NEW HI 60 MIN: CPT | Mod: S$PBB,,, | Performed by: STUDENT IN AN ORGANIZED HEALTH CARE EDUCATION/TRAINING PROGRAM

## 2025-05-20 NOTE — H&P (VIEW-ONLY)
"5/20/2025    Taco Odonnell   New Patient History and Physical    Chief Complaint   Patient presents with    Abnormal Ct Scan       HPI:Mr Odonnell is a 79 year old man who presents with abnormal CT.   Hx of HTN, CAD, pafib on xarelto.     He is followed by Dr Gallegos who referred to me to assess" new left mediastinal mass wand mediastinal lymphadenopathy...work up for hoarseness of voice for 4 weeks. .    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     Former smoker, 25 years ago, smoked about 2 pack per day   15 - about 51 years old. No hx of infection, bronchitis or pneumonia.     Not on oxygen at home.     The chief complaint problem is New to me    PFSH:  Past Medical History:   Diagnosis Date    Benign hypertension     Coronary artery disease     Glaucoma     Hyperlipidemia LDL goal < 70     Myocardial infarction 2001    Paroxysmal atrial fibrillation          Past Surgical History:   Procedure Laterality Date    CATARACT EXTRACTION      CORONARY ARTERY BYPASS GRAFT  01/01/2003    LAMINECTOMY N/A 11/29/2023    Procedure: LAMINECTOMY, SPINE;  Surgeon: Jhony Gallegos DO;  Location: [facility];  Service: Neurosurgery;  Laterality: N/A;  Bilateral L4-5 Laminectomy with Resection Extradural Mass    ROTATOR CUFF REPAIR  01/01/2010     Social History[1]  Family History   Problem Relation Name Age of Onset    Heart attack Mother      Hyperlipidemia Mother      Dementia Father      Cancer Father          skin    Heart attack Sister      Hyperlipidemia Sister       Review of patient's allergies indicates:   Allergen Reactions    Penicillin g Other (See Comments)     Showed on allergy test        Performance Status:The patient's activity level is functions out of house.  Used to walk 5 miles a day, got a pinched nerve in his leg, has not walked in a few months. He can walk up a flight of stairs.     Review of Systems:   Review of Systems   Constitutional:  Negative " for chills, fever, malaise/fatigue and weight loss.   HENT:  Negative for congestion, sinus pain and sore throat.    Eyes:  Negative for blurred vision and pain.   Respiratory:  Positive for cough (coughs at night intermittently). Negative for shortness of breath and wheezing.    Cardiovascular:  Negative for chest pain, palpitations, orthopnea, claudication and leg swelling.   Gastrointestinal:  Negative for abdominal pain, constipation, diarrhea, heartburn, melena, nausea and vomiting.   Genitourinary:  Negative for dysuria, frequency, hematuria and urgency.   Skin:  Negative for itching and rash.   Neurological:  Negative for dizziness, seizures, loss of consciousness and headaches.   Endo/Heme/Allergies:  Negative for environmental allergies and polydipsia. Does not bruise/bleed easily.   Psychiatric/Behavioral:  Negative for depression. The patient is not nervous/anxious.         Exam:  Physical Exam  Vitals reviewed.   Constitutional:       General: He is not in acute distress.     Appearance: He is well-developed. He is not diaphoretic.   HENT:      Head: Normocephalic and atraumatic.      Mouth/Throat:      Pharynx: No oropharyngeal exudate or posterior oropharyngeal erythema.   Eyes:      General: No scleral icterus.     Pupils: Pupils are equal, round, and reactive to light.   Neck:      Vascular: No JVD.   Cardiovascular:      Rate and Rhythm: Normal rate and regular rhythm.      Heart sounds: Normal heart sounds. No murmur heard.  Pulmonary:      Effort: Pulmonary effort is normal. No respiratory distress.      Breath sounds: Normal breath sounds. No wheezing.   Abdominal:      General: Bowel sounds are normal. There is no distension.      Palpations: Abdomen is soft.      Tenderness: There is no abdominal tenderness.   Musculoskeletal:         General: No swelling.      Cervical back: Normal range of motion and neck supple. No rigidity.   Skin:     General: Skin is warm and dry.      Capillary Refill:  Capillary refill takes less than 2 seconds.      Coloration: Skin is not pale.      Findings: No rash.   Neurological:      General: No focal deficit present.      Mental Status: He is alert and oriented to person, place, and time.      Cranial Nerves: No cranial nerve deficit.      Motor: No weakness or abnormal muscle tone.          Radiographs (ct chest and cxr) reviewed: view by direct vision and interpretation as below     CT and PET reviewed, there is extensive enlarged left medistinal mass that extends into the left hilum, and associated with left lower lobe opacity.     Labs reviewed     Lab Results   Component Value Date    WBC 7.44 04/25/2025    HGB 13.4 (L) 04/25/2025    HCT 40.0 04/25/2025    MCV 93 04/25/2025     04/25/2025       CMP  Sodium   Date Value Ref Range Status   05/01/2025 139 136 - 145 mmol/L Final     Potassium   Date Value Ref Range Status   05/01/2025 3.8 3.5 - 5.1 mmol/L Final     Chloride   Date Value Ref Range Status   05/01/2025 104 95 - 110 mmol/L Final     CO2   Date Value Ref Range Status   05/01/2025 28 23 - 29 mmol/L Final     Glucose   Date Value Ref Range Status   05/01/2025 95 70 - 110 mg/dL Final     BUN   Date Value Ref Range Status   05/01/2025 20 8 - 23 mg/dL Final     Creatinine   Date Value Ref Range Status   05/01/2025 0.7 0.5 - 1.4 mg/dL Final     Calcium   Date Value Ref Range Status   05/01/2025 9.4 8.7 - 10.5 mg/dL Final     Protein Total   Date Value Ref Range Status   04/25/2025 7.1 6.0 - 8.4 gm/dL Final     Albumin   Date Value Ref Range Status   04/25/2025 4.0 3.5 - 5.2 g/dL Final     Bilirubin Total   Date Value Ref Range Status   04/25/2025 0.8 0.1 - 1.0 mg/dL Final     Comment:     For infants and newborns, interpretation of results should be based   on gestational age, weight and in agreement with clinical   observations.    Premature Infant recommended reference ranges:   0-24 hours:  <8.0 mg/dL   24-48 hours: <12.0 mg/dL   3-5 days:    <15.0 mg/dL    6-29 days:   <15.0 mg/dL     ALP   Date Value Ref Range Status   2025 86 55 - 135 unit/L Final     AST   Date Value Ref Range Status   2025 19 10 - 40 unit/L Final     ALT   Date Value Ref Range Status   2025 14 10 - 44 unit/L Final     Anion Gap   Date Value Ref Range Status   2025 7 (L) 8 - 16 mmol/L Final     eGFR   Date Value Ref Range Status   2025 >60 >60 mL/min/1.73/m2 Final   2024 >60.0 >60 mL/min/1.73 m^2 Final             PFT was not done      Plan:  Clinical impression is apparently straight forward and impression with management as below.    Mr Odonnell is a 79 year old man who presents with a mediastinal lung mass that extends to the left hilum, which is FDG active.     Will proceed with EBUS on May 26. He is on Xarelto, we can hold for 72 hrs. Last dose of xarelto is Thursday May 22nd. He is off of aspirin.         Problem List Items Addressed This Visit    None      No follow-ups on file.    Discussed with patient above for education the following:      There are no Patient Instructions on file for this visit.         [1]   Social History  Tobacco Use    Smoking status: Former     Current packs/day: 0.00     Average packs/day: 1 pack/day for 40.0 years (40.0 ttl pk-yrs)     Types: Cigarettes     Start date: 1962     Quit date: 2002     Years since quittin.3    Smokeless tobacco: Never   Substance Use Topics    Alcohol use: No    Drug use: No

## 2025-05-20 NOTE — PROGRESS NOTES
"5/20/2025    Taco Odonnell   New Patient History and Physical    Chief Complaint   Patient presents with    Abnormal Ct Scan       HPI:Mr Odonnell is a 79 year old man who presents with abnormal CT.   Hx of HTN, CAD, pafib on xarelto.     He is followed by Dr Gallegos who referred to me to assess" new left mediastinal mass wand mediastinal lymphadenopathy...work up for hoarseness of voice for 4 weeks. .    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     Former smoker, 25 years ago, smoked about 2 pack per day   15 - about 51 years old. No hx of infection, bronchitis or pneumonia.     Not on oxygen at home.     The chief complaint problem is New to me    PFSH:  Past Medical History:   Diagnosis Date    Benign hypertension     Coronary artery disease     Glaucoma     Hyperlipidemia LDL goal < 70     Myocardial infarction 2001    Paroxysmal atrial fibrillation          Past Surgical History:   Procedure Laterality Date    CATARACT EXTRACTION      CORONARY ARTERY BYPASS GRAFT  01/01/2003    LAMINECTOMY N/A 11/29/2023    Procedure: LAMINECTOMY, SPINE;  Surgeon: Jhony Gallegos DO;  Location: [facility];  Service: Neurosurgery;  Laterality: N/A;  Bilateral L4-5 Laminectomy with Resection Extradural Mass    ROTATOR CUFF REPAIR  01/01/2010     Social History[1]  Family History   Problem Relation Name Age of Onset    Heart attack Mother      Hyperlipidemia Mother      Dementia Father      Cancer Father          skin    Heart attack Sister      Hyperlipidemia Sister       Review of patient's allergies indicates:   Allergen Reactions    Penicillin g Other (See Comments)     Showed on allergy test        Performance Status:The patient's activity level is functions out of house.  Used to walk 5 miles a day, got a pinched nerve in his leg, has not walked in a few months. He can walk up a flight of stairs.     Review of Systems:   Review of Systems   Constitutional:  Negative " for chills, fever, malaise/fatigue and weight loss.   HENT:  Negative for congestion, sinus pain and sore throat.    Eyes:  Negative for blurred vision and pain.   Respiratory:  Positive for cough (coughs at night intermittently). Negative for shortness of breath and wheezing.    Cardiovascular:  Negative for chest pain, palpitations, orthopnea, claudication and leg swelling.   Gastrointestinal:  Negative for abdominal pain, constipation, diarrhea, heartburn, melena, nausea and vomiting.   Genitourinary:  Negative for dysuria, frequency, hematuria and urgency.   Skin:  Negative for itching and rash.   Neurological:  Negative for dizziness, seizures, loss of consciousness and headaches.   Endo/Heme/Allergies:  Negative for environmental allergies and polydipsia. Does not bruise/bleed easily.   Psychiatric/Behavioral:  Negative for depression. The patient is not nervous/anxious.         Exam:  Physical Exam  Vitals reviewed.   Constitutional:       General: He is not in acute distress.     Appearance: He is well-developed. He is not diaphoretic.   HENT:      Head: Normocephalic and atraumatic.      Mouth/Throat:      Pharynx: No oropharyngeal exudate or posterior oropharyngeal erythema.   Eyes:      General: No scleral icterus.     Pupils: Pupils are equal, round, and reactive to light.   Neck:      Vascular: No JVD.   Cardiovascular:      Rate and Rhythm: Normal rate and regular rhythm.      Heart sounds: Normal heart sounds. No murmur heard.  Pulmonary:      Effort: Pulmonary effort is normal. No respiratory distress.      Breath sounds: Normal breath sounds. No wheezing.   Abdominal:      General: Bowel sounds are normal. There is no distension.      Palpations: Abdomen is soft.      Tenderness: There is no abdominal tenderness.   Musculoskeletal:         General: No swelling.      Cervical back: Normal range of motion and neck supple. No rigidity.   Skin:     General: Skin is warm and dry.      Capillary Refill:  Capillary refill takes less than 2 seconds.      Coloration: Skin is not pale.      Findings: No rash.   Neurological:      General: No focal deficit present.      Mental Status: He is alert and oriented to person, place, and time.      Cranial Nerves: No cranial nerve deficit.      Motor: No weakness or abnormal muscle tone.          Radiographs (ct chest and cxr) reviewed: view by direct vision and interpretation as below     CT and PET reviewed, there is extensive enlarged left medistinal mass that extends into the left hilum, and associated with left lower lobe opacity.     Labs reviewed     Lab Results   Component Value Date    WBC 7.44 04/25/2025    HGB 13.4 (L) 04/25/2025    HCT 40.0 04/25/2025    MCV 93 04/25/2025     04/25/2025       CMP  Sodium   Date Value Ref Range Status   05/01/2025 139 136 - 145 mmol/L Final     Potassium   Date Value Ref Range Status   05/01/2025 3.8 3.5 - 5.1 mmol/L Final     Chloride   Date Value Ref Range Status   05/01/2025 104 95 - 110 mmol/L Final     CO2   Date Value Ref Range Status   05/01/2025 28 23 - 29 mmol/L Final     Glucose   Date Value Ref Range Status   05/01/2025 95 70 - 110 mg/dL Final     BUN   Date Value Ref Range Status   05/01/2025 20 8 - 23 mg/dL Final     Creatinine   Date Value Ref Range Status   05/01/2025 0.7 0.5 - 1.4 mg/dL Final     Calcium   Date Value Ref Range Status   05/01/2025 9.4 8.7 - 10.5 mg/dL Final     Protein Total   Date Value Ref Range Status   04/25/2025 7.1 6.0 - 8.4 gm/dL Final     Albumin   Date Value Ref Range Status   04/25/2025 4.0 3.5 - 5.2 g/dL Final     Bilirubin Total   Date Value Ref Range Status   04/25/2025 0.8 0.1 - 1.0 mg/dL Final     Comment:     For infants and newborns, interpretation of results should be based   on gestational age, weight and in agreement with clinical   observations.    Premature Infant recommended reference ranges:   0-24 hours:  <8.0 mg/dL   24-48 hours: <12.0 mg/dL   3-5 days:    <15.0 mg/dL    6-29 days:   <15.0 mg/dL     ALP   Date Value Ref Range Status   2025 86 55 - 135 unit/L Final     AST   Date Value Ref Range Status   2025 19 10 - 40 unit/L Final     ALT   Date Value Ref Range Status   2025 14 10 - 44 unit/L Final     Anion Gap   Date Value Ref Range Status   2025 7 (L) 8 - 16 mmol/L Final     eGFR   Date Value Ref Range Status   2025 >60 >60 mL/min/1.73/m2 Final   2024 >60.0 >60 mL/min/1.73 m^2 Final             PFT was not done      Plan:  Clinical impression is apparently straight forward and impression with management as below.    Mr Odonnell is a 79 year old man who presents with a mediastinal lung mass that extends to the left hilum, which is FDG active.     Will proceed with EBUS on May 26. He is on Xarelto, we can hold for 72 hrs. Last dose of xarelto is Thursday May 22nd. He is off of aspirin.         Problem List Items Addressed This Visit    None      No follow-ups on file.    Discussed with patient above for education the following:      There are no Patient Instructions on file for this visit.         [1]   Social History  Tobacco Use    Smoking status: Former     Current packs/day: 0.00     Average packs/day: 1 pack/day for 40.0 years (40.0 ttl pk-yrs)     Types: Cigarettes     Start date: 1962     Quit date: 2002     Years since quittin.3    Smokeless tobacco: Never   Substance Use Topics    Alcohol use: No    Drug use: No

## 2025-05-26 ENCOUNTER — ANESTHESIA EVENT (OUTPATIENT)
Dept: SURGERY | Facility: HOSPITAL | Age: 80
End: 2025-05-26
Payer: MEDICARE

## 2025-05-26 ENCOUNTER — ANESTHESIA (OUTPATIENT)
Dept: SURGERY | Facility: HOSPITAL | Age: 80
End: 2025-05-26
Payer: MEDICARE

## 2025-05-26 ENCOUNTER — HOSPITAL ENCOUNTER (OUTPATIENT)
Facility: HOSPITAL | Age: 80
Discharge: HOME | End: 2025-05-26
Attending: STUDENT IN AN ORGANIZED HEALTH CARE EDUCATION/TRAINING PROGRAM | Admitting: STUDENT IN AN ORGANIZED HEALTH CARE EDUCATION/TRAINING PROGRAM
Payer: MEDICARE

## 2025-05-26 VITALS
BODY MASS INDEX: 27.4 KG/M2 | TEMPERATURE: 97 F | DIASTOLIC BLOOD PRESSURE: 67 MMHG | WEIGHT: 185 LBS | OXYGEN SATURATION: 95 % | RESPIRATION RATE: 15 BRPM | HEART RATE: 58 BPM | SYSTOLIC BLOOD PRESSURE: 148 MMHG | HEIGHT: 69 IN

## 2025-05-26 DIAGNOSIS — R91.8 LUNG MASS: ICD-10-CM

## 2025-05-26 PROCEDURE — 63600175 PHARM REV CODE 636 W HCPCS: Performed by: STUDENT IN AN ORGANIZED HEALTH CARE EDUCATION/TRAINING PROGRAM

## 2025-05-26 PROCEDURE — 25000003 PHARM REV CODE 250: Performed by: STUDENT IN AN ORGANIZED HEALTH CARE EDUCATION/TRAINING PROGRAM

## 2025-05-26 PROCEDURE — 27201114 HC TRAP (ANY): Performed by: STUDENT IN AN ORGANIZED HEALTH CARE EDUCATION/TRAINING PROGRAM

## 2025-05-26 PROCEDURE — 37000009 HC ANESTHESIA EA ADD 15 MINS: Performed by: STUDENT IN AN ORGANIZED HEALTH CARE EDUCATION/TRAINING PROGRAM

## 2025-05-26 PROCEDURE — 27000679 HC ADAPTOR, BRONCHOSCOPE: Performed by: STUDENT IN AN ORGANIZED HEALTH CARE EDUCATION/TRAINING PROGRAM

## 2025-05-26 PROCEDURE — 31653 BRONCH EBUS SAMPLNG 3/> NODE: CPT | Performed by: STUDENT IN AN ORGANIZED HEALTH CARE EDUCATION/TRAINING PROGRAM

## 2025-05-26 PROCEDURE — 88341 IMHCHEM/IMCYTCHM EA ADD ANTB: CPT | Mod: TC | Performed by: PATHOLOGY

## 2025-05-26 PROCEDURE — 31653 BRONCH EBUS SAMPLNG 3/> NODE: CPT | Mod: ,,, | Performed by: STUDENT IN AN ORGANIZED HEALTH CARE EDUCATION/TRAINING PROGRAM

## 2025-05-26 PROCEDURE — 27202053 HC NEEDLE BIOPSY EUS: Performed by: STUDENT IN AN ORGANIZED HEALTH CARE EDUCATION/TRAINING PROGRAM

## 2025-05-26 PROCEDURE — 37000008 HC ANESTHESIA 1ST 15 MINUTES: Performed by: STUDENT IN AN ORGANIZED HEALTH CARE EDUCATION/TRAINING PROGRAM

## 2025-05-26 RX ORDER — LIDOCAINE HYDROCHLORIDE 20 MG/ML
INJECTION, SOLUTION EPIDURAL; INFILTRATION; INTRACAUDAL; PERINEURAL
Status: DISCONTINUED | OUTPATIENT
Start: 2025-05-26 | End: 2025-05-26

## 2025-05-26 RX ORDER — ROCURONIUM BROMIDE 10 MG/ML
INJECTION, SOLUTION INTRAVENOUS
Status: DISCONTINUED | OUTPATIENT
Start: 2025-05-26 | End: 2025-05-26

## 2025-05-26 RX ORDER — FENTANYL CITRATE 50 UG/ML
INJECTION, SOLUTION INTRAMUSCULAR; INTRAVENOUS
Status: DISCONTINUED | OUTPATIENT
Start: 2025-05-26 | End: 2025-05-26

## 2025-05-26 RX ORDER — SODIUM CHLORIDE, SODIUM LACTATE, POTASSIUM CHLORIDE, CALCIUM CHLORIDE 600; 310; 30; 20 MG/100ML; MG/100ML; MG/100ML; MG/100ML
INJECTION, SOLUTION INTRAVENOUS CONTINUOUS PRN
Status: DISCONTINUED | OUTPATIENT
Start: 2025-05-26 | End: 2025-05-26

## 2025-05-26 RX ORDER — GLUCAGON 1 MG
1 KIT INJECTION
Status: DISCONTINUED | OUTPATIENT
Start: 2025-05-26 | End: 2025-05-26 | Stop reason: HOSPADM

## 2025-05-26 RX ORDER — LIDOCAINE HYDROCHLORIDE 20 MG/ML
JELLY TOPICAL
Status: DISCONTINUED | OUTPATIENT
Start: 2025-05-26 | End: 2025-05-26

## 2025-05-26 RX ORDER — ACETAMINOPHEN 10 MG/ML
INJECTION, SOLUTION INTRAVENOUS
Status: DISCONTINUED | OUTPATIENT
Start: 2025-05-26 | End: 2025-05-26

## 2025-05-26 RX ORDER — LIDOCAINE HYDROCHLORIDE 10 MG/ML
INJECTION, SOLUTION INFILTRATION; PERINEURAL
Status: COMPLETED | OUTPATIENT
Start: 2025-05-26 | End: 2025-05-26

## 2025-05-26 RX ORDER — SUCCINYLCHOLINE CHLORIDE 20 MG/ML
INJECTION INTRAMUSCULAR; INTRAVENOUS
Status: DISCONTINUED | OUTPATIENT
Start: 2025-05-26 | End: 2025-05-26

## 2025-05-26 RX ORDER — EPHEDRINE SULFATE 50 MG/ML
INJECTION, SOLUTION INTRAVENOUS
Status: DISCONTINUED | OUTPATIENT
Start: 2025-05-26 | End: 2025-05-26

## 2025-05-26 RX ORDER — FAMOTIDINE 10 MG/ML
INJECTION, SOLUTION INTRAVENOUS
Status: DISCONTINUED | OUTPATIENT
Start: 2025-05-26 | End: 2025-05-26

## 2025-05-26 RX ORDER — DIPHENHYDRAMINE HYDROCHLORIDE 50 MG/ML
12.5 INJECTION, SOLUTION INTRAMUSCULAR; INTRAVENOUS
Status: DISCONTINUED | OUTPATIENT
Start: 2025-05-26 | End: 2025-05-26 | Stop reason: HOSPADM

## 2025-05-26 RX ORDER — PROPOFOL 10 MG/ML
VIAL (ML) INTRAVENOUS CONTINUOUS PRN
Status: DISCONTINUED | OUTPATIENT
Start: 2025-05-26 | End: 2025-05-26

## 2025-05-26 RX ORDER — ONDANSETRON HYDROCHLORIDE 2 MG/ML
4 INJECTION, SOLUTION INTRAVENOUS DAILY PRN
Status: DISCONTINUED | OUTPATIENT
Start: 2025-05-26 | End: 2025-05-26 | Stop reason: HOSPADM

## 2025-05-26 RX ORDER — HYDROMORPHONE HYDROCHLORIDE 1 MG/ML
0.2 INJECTION, SOLUTION INTRAMUSCULAR; INTRAVENOUS; SUBCUTANEOUS EVERY 5 MIN PRN
Status: DISCONTINUED | OUTPATIENT
Start: 2025-05-26 | End: 2025-05-26 | Stop reason: HOSPADM

## 2025-05-26 RX ORDER — OXYCODONE HYDROCHLORIDE 5 MG/1
5 TABLET ORAL
Status: DISCONTINUED | OUTPATIENT
Start: 2025-05-26 | End: 2025-05-26 | Stop reason: HOSPADM

## 2025-05-26 RX ORDER — DEXAMETHASONE SODIUM PHOSPHATE 4 MG/ML
INJECTION, SOLUTION INTRA-ARTICULAR; INTRALESIONAL; INTRAMUSCULAR; INTRAVENOUS; SOFT TISSUE
Status: DISCONTINUED | OUTPATIENT
Start: 2025-05-26 | End: 2025-05-26

## 2025-05-26 RX ORDER — ONDANSETRON HYDROCHLORIDE 2 MG/ML
INJECTION, SOLUTION INTRAVENOUS
Status: DISCONTINUED | OUTPATIENT
Start: 2025-05-26 | End: 2025-05-26

## 2025-05-26 RX ORDER — PROPOFOL 10 MG/ML
VIAL (ML) INTRAVENOUS
Status: DISCONTINUED | OUTPATIENT
Start: 2025-05-26 | End: 2025-05-26

## 2025-05-26 RX ADMIN — LIDOCAINE HYDROCHLORIDE 4 ML: 20 JELLY TOPICAL at 10:05

## 2025-05-26 RX ADMIN — FAMOTIDINE 20 MG: 10 INJECTION, SOLUTION INTRAVENOUS at 10:05

## 2025-05-26 RX ADMIN — LIDOCAINE HYDROCHLORIDE 100 MG: 20 INJECTION, SOLUTION INTRAVENOUS at 10:05

## 2025-05-26 RX ADMIN — EPHEDRINE SULFATE 25 MG: 50 INJECTION INTRAVENOUS at 11:05

## 2025-05-26 RX ADMIN — PROPOFOL 50 MCG/KG/MIN: 10 INJECTION, EMULSION INTRAVENOUS at 10:05

## 2025-05-26 RX ADMIN — SUGAMMADEX 200 MG: 100 INJECTION, SOLUTION INTRAVENOUS at 12:05

## 2025-05-26 RX ADMIN — FENTANYL CITRATE 100 MCG: 50 INJECTION, SOLUTION INTRAMUSCULAR; INTRAVENOUS at 10:05

## 2025-05-26 RX ADMIN — DEXAMETHASONE SODIUM PHOSPHATE 4 MG: 4 INJECTION, SOLUTION INTRAMUSCULAR; INTRAVENOUS at 10:05

## 2025-05-26 RX ADMIN — SODIUM CHLORIDE, SODIUM LACTATE, POTASSIUM CHLORIDE, AND CALCIUM CHLORIDE: .6; .31; .03; .02 INJECTION, SOLUTION INTRAVENOUS at 10:05

## 2025-05-26 RX ADMIN — PROPOFOL 120 MG: 10 INJECTION, EMULSION INTRAVENOUS at 10:05

## 2025-05-26 RX ADMIN — ROCURONIUM BROMIDE 5 MG: 10 INJECTION, SOLUTION INTRAVENOUS at 10:05

## 2025-05-26 RX ADMIN — Medication 200 MG: at 10:05

## 2025-05-26 RX ADMIN — ONDANSETRON 4 MG: 2 INJECTION INTRAMUSCULAR; INTRAVENOUS at 10:05

## 2025-05-26 RX ADMIN — GLYCOPYRROLATE 0.3 MG: 0.2 INJECTION, SOLUTION INTRAMUSCULAR; INTRAVENOUS at 10:05

## 2025-05-26 RX ADMIN — EPHEDRINE SULFATE 25 MG: 50 INJECTION INTRAVENOUS at 10:05

## 2025-05-26 RX ADMIN — ACETAMINOPHEN 1000 MG: 10 INJECTION, SOLUTION INTRAVENOUS at 11:05

## 2025-05-26 NOTE — INTERVAL H&P NOTE
The patient has been examined and the H&P has been reviewed:    I concur with the findings and no changes have occurred since H&P was written.    Procedure risks, benefits and alternative options discussed and understood by patient/family.          Active Hospital Problems    Diagnosis  POA    Lung mass [R91.8]  Yes     Priority: 1 - High      Resolved Hospital Problems   No resolved problems to display.

## 2025-05-26 NOTE — PROCEDURES
EBUS/BRONCHOSCOPY PROCEDURE    Indication:  Lung mass     Consent: The benefits, risks, and alternatives of the procedure were discussed, and informed consent was obtained from the patient.    Intra-procedural medications: See nurse note. The patient underwent the procedure with general anesthesia; see anesthesiology records for further details.    Time Out: A time out was performed prior initiation of the procedure.    Procedure: The bronchoscope was passed with ease through the ET tube. The patient tolerated the procedure well. The views were adequate.    Findings:  Video Bronchoscopy  *Overall:     1st the EBUS scope was inserted the airway to evaluate for station 4R which was 12.3 mm in size, 5 total passes were obtained with a 25 gauge needle. ROXANA was positive at this site for atypical cells.     Then the EBUS scope was advanced to station 11R which was 16 x 10.5 mm in size.  Four passes were obtained with a 25 gauge needle.    Then the EBUS scope was advanced to station 7 which was 14 mm in size, 4 passes were obtained with a 25 gauge needle.    Then the EBUS scope was advanced to station 11R and 10R, both appeared to be small, shotty and poorly delineated borders.  I elected not to biopsy at the site.    Then the diagnostic scope was inserted the airway.  All the right and left-sided airways were inspected down to subsegmental levels.  There was bilateral evidence of pitting.  Secretions were thin and easily suctioned and removed.  There was evidence of external compression of the left upper lobe and the subsegments within that area.  There was no evidence of any intrinsic disease or endobronchial disease and appeared to be compressed due to external compression.  I was able to pass a scope in all subsegments of the left upper lobe with the use of push saline.    All other airways other than what was mentioned above were patent without evidence of endobronchial disease, mucosal abnormalities secretions or  erythema.  There was no evidence of ongoing bleeding or oozing.    Unplanned Events:  *There were no unplanned events. The patient's vital signs remained normal throughout the procedure.  *ETT was removed in the procedure room.  *Patient was sent in stable condition to recovery area.    Estimated Blood Loss: Minimal.    Recommendations/Plans:  Follow up in oncology clinic as scheduled.    Oren Chaudhari MD  Pulmonary and Critical Care Medicine  [facility]  05/26/2025  12:08 PM

## 2025-05-26 NOTE — ANESTHESIA PREPROCEDURE EVALUATION
05/26/2025  Taco Odonnell Jr is a 79 y.o., male.      Problem List[1]    Past Surgical History:   Procedure Laterality Date    CATARACT EXTRACTION      CORONARY ARTERY BYPASS GRAFT  01/01/2003    LAMINECTOMY N/A 11/29/2023    Procedure: LAMINECTOMY, SPINE;  Surgeon: Jhony Gallegos DO;  Location: [facility];  Service: Neurosurgery;  Laterality: N/A;  Bilateral L4-5 Laminectomy with Resection Extradural Mass    ROTATOR CUFF REPAIR  01/01/2010        Tobacco Use:  The patient  reports that he quit smoking about 23 years ago. His smoking use included cigarettes. He started smoking about 63 years ago. He has a 40 pack-year smoking history. He has never used smokeless tobacco.     Results for orders placed or performed in visit on 11/07/23   IN OFFICE EKG 12-LEAD (to [employer]ued Relationships)    Collection Time: 11/07/23  4:05 PM    Narrative    Test Reason : R94.31,I48.0,I10,E78.5,Z78.9,    Vent. Rate : 070 BPM     Atrial Rate : 070 BPM     P-R Int : 142 ms          QRS Dur : 078 ms      QT Int : 380 ms       P-R-T Axes : 074 059 099 degrees     QTc Int : 410 ms    Normal sinus rhythm  Possible Left atrial enlargement  ST and T wave abnormality, consider anterolateral ischemia  Abnormal ECG  When compared with ECG of 06-NOV-2023 11:47,  Criteria for Inferior infarct are no longer Present  Confirmed by Roldan Garza MD ([id number]) on 11/9/2023 8:27:37 PM    Referred By:             Confirmed By:Roldan Garza MD             Lab Results   Component Value Date    WBC 7.44 04/25/2025    HGB 13.4 (L) 04/25/2025    HCT 40.0 04/25/2025    MCV 93 04/25/2025     04/25/2025     BMP  Lab Results   Component Value Date     05/01/2025    K 3.8 05/01/2025     05/01/2025    CO2 28 05/01/2025    BUN 20 05/01/2025    CREATININE 0.7 05/01/2025    CALCIUM 9.4 05/01/2025    ANIONGAP 7 (L) 05/01/2025    GLU 95 05/01/2025      04/25/2025     12/13/2024       Results for orders placed during the hospital encounter of 11/13/23    Echo    Interpretation Summary    Left Ventricle: The left ventricle is normal in size. Normal wall thickness. Normal wall motion. There is normal systolic function with a visually estimated ejection fraction of 65 - 70%. There is normal diastolic function.    Right Ventricle: Normal right ventricular cavity size. Wall thickness is normal. Right ventricle wall motion  is normal. Systolic function is normal.    Left Atrium: Left atrium is mildly dilated.    Right Atrium: Right atrium is mildly dilated.    Aortic Valve: The aortic valve is a trileaflet valve. There is mild aortic valve sclerosis. There is mild aortic regurgitation with a centrally directed jet.    Mitral Valve: There is mild regurgitation with a centrally directed jet.    IVC/SVC: Normal venous pressure at 3 mmHg.              Pre-op Assessment    I have reviewed the Patient Summary Reports.     I have reviewed the Nursing Notes. I have reviewed the NPO Status.   I have reviewed the Medications.     Review of Systems  Anesthesia Hx:  No problems with previous Anesthesia             Denies Family Hx of Anesthesia complications.    Denies Personal Hx of Anesthesia complications.                    Social:  Former Smoker       Hematology/Oncology:  Hematology Normal                  Hematology Comments: Last Xarelto 4 days ago.    Current/Recent Cancer.                EENT/Dental:   Permanent bridges.  Hoarseness for the past month.          Cardiovascular:     Hypertension, well controlled  Past MI (hx of MI 2001) CAD (denies recent symptoms)  asymptomatic CABG/stent (CABG 2003) Dysrhythmias (hx of paroxysmal A fib, sinus on most recent EKG) atrial fibrillation      hyperlipidemia                               Pulmonary:  Pulmonary Normal        Mediastinal lung mass, left hilar adenopathy.  Hx of thyroid/cervical node biopsy positive for  squamous carcinoma.  Patient denies orthopnea but has recently developed a persistent cough when he lies flat.  No dysphagia but a feeling of tightness in his neck when he does swallow.               Hepatic/GI:  Hepatic/GI Normal                    Musculoskeletal:         Spine Disorders: (hx of lumbar stenosis, s/p laminectomy) lumbar Degenerative disease           Neurological:  Neurology Normal                                      Endocrine:  Endocrine Normal                Physical Exam  General: Well nourished, Cooperative, Alert and Oriented    Airway:  Mallampati: III / II  Mouth Opening: Normal  TM Distance: Normal  Tongue: Normal  Neck ROM: Normal ROM    Dental:  Intact    Chest/Lungs:  Clear to auscultation, Normal Respiratory Rate  Very mild left expiratory wheeze.  Heart:  Rate: Normal  Rhythm: Regular Rhythm  Sounds: Normal    Abdomen:  Normal, Soft, Nontender        Anesthesia Plan  Type of Anesthesia, risks & benefits discussed:    Anesthesia Type: Gen ETT  Intra-op Monitoring Plan: Standard ASA Monitors  Post Op Pain Control Plan: multimodal analgesia and IV/PO Opioids PRN  Induction:  IV  Airway Plan: Video, Post-Induction  Informed Consent: Informed consent signed with the Patient and all parties understand the risks and agree with anesthesia plan.  All questions answered.   ASA Score: 3  Anesthesia Plan Notes: GETA  Preoxygenate sitting up.  IV tylenol  Zofran Decadron Pepcid    Ready For Surgery From Anesthesia Perspective.     .           [1]   Patient Active Problem List  Diagnosis    Hyperlipidemia LDL goal < 70    Coronary artery disease    Benign hypertension    Glaucoma    Paroxysmal atrial fibrillation    Statin intolerance    Nonspecific abnormal electrocardiogram (ECG) (EKG)    Low back pain    Preoperative evaluation of a medical condition to rule out surgical contraindications (TAR required)    Lumbar stenosis    Status post laminectomy

## 2025-05-26 NOTE — ANESTHESIA POSTPROCEDURE EVALUATION
Anesthesia Post Evaluation    Patient: Taco Odonnell Jr    Procedure(s) Performed: Procedure(s) (LRB):  ENDOBRONCHIAL ULTRASOUND (EBUS) (N/A)    Final Anesthesia Type: general      Patient location during evaluation: PACU  Patient participation: Yes- Able to Participate  Level of consciousness: awake and alert  Post-procedure vital signs: reviewed and stable  Pain management: adequate  Airway patency: patent    PONV status at discharge: No PONV  Anesthetic complications: no      Cardiovascular status: blood pressure returned to baseline and stable  Respiratory status: unassisted and room air  Hydration status: euvolemic  Follow-up not needed.              Vitals Value Taken Time   /67 05/26/25 12:58   Temp 36.2 °C (97.1 °F) 05/26/25 12:58   Pulse 58 05/26/25 12:58   Resp 17 05/26/25 12:58   SpO2 95 % 05/26/25 12:58         Event Time   Out of Recovery 12:53:41         Pain/Gold Score: Gold Score: 10 (5/26/2025 12:45 PM)

## 2025-05-26 NOTE — ANESTHESIA PROCEDURE NOTES
Intubation    Date/Time: 5/26/2025 10:49 AM    Performed by: Lilliana Charles CRNA  Authorized by: Bakari Crump MD    Intubation:     Induction:  Intravenous    Intubated:  Postinduction    Mask Ventilation:  Easy mask    Attempts:  1    Attempted By:  CRNA    Method of Intubation:  Video laryngoscopy    Blade:  Maher 4    Laryngeal View Grade: Grade I - full view of cords      Difficult Airway Encountered?: No      Complications:  None    Airway Device:  Oral endotracheal tube    Airway Device Size:  8.5    Style/Cuff Inflation:  Cuffed    Secured at:  The lips    Placement Verified By:  Capnometry    Complicating Factors:  None    Findings Post-Intubation:  BS equal bilateral and atraumatic/condition of teeth unchanged

## 2025-05-26 NOTE — TRANSFER OF CARE
"Anesthesia Transfer of Care Note    Patient: Taco Odonnell Jr    Procedure(s) Performed: Procedure(s) (LRB):  ENDOBRONCHIAL ULTRASOUND (EBUS) (N/A)    Patient location: PACU    Anesthesia Type: general    Transport from OR: Transported from OR on room air with adequate spontaneous ventilation    Post pain: adequate analgesia    Post assessment: no apparent anesthetic complications and tolerated procedure well    Post vital signs: stable    Level of consciousness: awake and alert    Nausea/Vomiting: no nausea/vomiting    Complications: none    Transfer of care protocol was followed    Last vitals: Visit Vitals  BP (!) 152/67 (BP Location: Right arm, Patient Position: Lying)   Pulse (!) 57   Temp 36 °C (96.8 °F) (Temporal)   Resp 18   Ht 5' 9" (1.753 m)   Wt 83.9 kg (185 lb)   SpO2 97%   BMI 27.32 kg/m²     "

## 2025-05-28 ENCOUNTER — TELEPHONE (OUTPATIENT)
Facility: CLINIC | Age: 80
End: 2025-05-28
Payer: MEDICARE

## 2025-05-28 DIAGNOSIS — C77.9 SQUAMOUS CELL CARCINOMA OF LYMPH NODE: ICD-10-CM

## 2025-05-28 DIAGNOSIS — R91.8 LUNG MASS: Primary | ICD-10-CM

## 2025-05-29 NOTE — PROGRESS NOTES
INITIAL [facility] HEM/ONC CONSULTATION      Subjective:       Patient ID: Taco Odonnell Jr is a 79 y.o. male.    5/13/2025:  LYMPH NODE, RIGHT CERVICAL, CORE BIOPSY   - POSITIVE FOR SQUAMOUS CELL CARCINOMA   P16 negative    5/26/2025-EBUS:  LN 4L: SCCA  LN 11L: SCCA  LN 7:  SCCA      5/16/2025-PET:  --left mediastinal mass within the sub aortic region AP window and extending into the left hilum measuring 5.7 x 4.2 cm   --multiple radiotracer avid lymph nodes in the level 2, level 3, and left level 4 regions of the neck   --The thyroid gland is enlarged and demonstrates diffuse intense uptake.             Chief Complaint: No chief complaint on file.  Squamous call carcinoma of unknown primary    Mr. Odonnell is a 78yo male who presented with hoarseness about one month ago.  Her went to his PCP, Dr. Gallegos who worked him up with CT imaging which shows left hilar, MS adenopathy and cervical adenopathy.  Biopsy has been done which shows p16 negative SCCA.  He has now started coughing and had some scant blood with this.      He has no cancer history and quite smoking 25 years ago.   No FH of cancer.    He does have significant sun exposure history.     Patient has history of CAD, AMI, stents which was followed with bypass surgery.  No recent trouble with this.    He is on xarelto for Afib.         Past Medical History:   Diagnosis Date    Benign hypertension     Coronary artery disease     Glaucoma     Hyperlipidemia LDL goal < 70     Myocardial infarction 2001    Paroxysmal atrial fibrillation        Past Surgical History:   Procedure Laterality Date    CATARACT EXTRACTION      CORONARY ARTERY BYPASS GRAFT  01/01/2003    ENDOBRONCHIAL ULTRASOUND N/A 5/26/2025    Procedure: ENDOBRONCHIAL ULTRASOUND (EBUS);  Surgeon: rOen Chaudhari MD;  Location: [facility] ENDO;  Service: Pulmonary;  Laterality: N/A;    LAMINECTOMY N/A 11/29/2023    Procedure: LAMINECTOMY, SPINE;  Surgeon: Jhony Gallegos DO;  Location: [facility] OR;  Service:  Neurosurgery;  Laterality: N/A;  Bilateral L4-5 Laminectomy with Resection Extradural Mass    ROTATOR CUFF REPAIR  2010       Social History     Socioeconomic History    Marital status: Single    Number of children: 0   Tobacco Use    Smoking status: Former     Current packs/day: 0.00     Average packs/day: 1 pack/day for 40.0 years (40.0 ttl pk-yrs)     Types: Cigarettes     Start date: 1962     Quit date: 2002     Years since quittin.4    Smokeless tobacco: Never   Substance and Sexual Activity    Alcohol use: No    Drug use: No       Family History   Problem Relation Name Age of Onset    Heart attack Mother      Hyperlipidemia Mother      Dementia Father      Cancer Father          skin    Heart attack Sister      Hyperlipidemia Sister         Review of patient's allergies indicates:   Allergen Reactions    Penicillin g Other (See Comments)     Showed on allergy test      Current Medications[1]    All medications and past history have been reviewed.    Review of Systems   Constitutional:  Negative for fever and unexpected weight change.   HENT:  Negative for nosebleeds.    Respiratory:  Negative for chest tightness and shortness of breath.    Cardiovascular:  Negative for chest pain.   Gastrointestinal:  Negative for abdominal pain and blood in stool.   Genitourinary:  Negative for hematuria.   Skin:  Negative for rash.   Hematological:  Does not bruise/bleed easily.       Objective:        BP (!) 141/65   Pulse (!) 56   Temp 97.8 °F (36.6 °C)   Resp 18   Wt 84.7 kg (186 lb 11.2 oz)   BMI 27.57 kg/m²     Physical Exam  Constitutional:       Appearance: Normal appearance.   HENT:      Head: Normocephalic and atraumatic.   Eyes:      General: No scleral icterus.     Conjunctiva/sclera: Conjunctivae normal.   Cardiovascular:      Rate and Rhythm: Normal rate.   Pulmonary:      Effort: Pulmonary effort is normal.   Abdominal:      General: Abdomen is flat.   Neurological:      General: No  focal deficit present.      Mental Status: He is alert and oriented to person, place, and time.   Psychiatric:         Mood and Affect: Mood normal.         Behavior: Behavior normal.           Lab  No results found for this or any previous visit (from the past 2 weeks).  CMP  Sodium   Date Value Ref Range Status   05/01/2025 139 136 - 145 mmol/L Final     Potassium   Date Value Ref Range Status   05/01/2025 3.8 3.5 - 5.1 mmol/L Final     Chloride   Date Value Ref Range Status   05/01/2025 104 95 - 110 mmol/L Final     CO2   Date Value Ref Range Status   05/01/2025 28 23 - 29 mmol/L Final     Glucose   Date Value Ref Range Status   05/01/2025 95 70 - 110 mg/dL Final     BUN   Date Value Ref Range Status   05/01/2025 20 8 - 23 mg/dL Final     Creatinine   Date Value Ref Range Status   05/01/2025 0.7 0.5 - 1.4 mg/dL Final     Calcium   Date Value Ref Range Status   05/01/2025 9.4 8.7 - 10.5 mg/dL Final     Protein Total   Date Value Ref Range Status   04/25/2025 7.1 6.0 - 8.4 gm/dL Final     Albumin   Date Value Ref Range Status   04/25/2025 4.0 3.5 - 5.2 g/dL Final     Bilirubin Total   Date Value Ref Range Status   04/25/2025 0.8 0.1 - 1.0 mg/dL Final     Comment:     For infants and newborns, interpretation of results should be based   on gestational age, weight and in agreement with clinical   observations.    Premature Infant recommended reference ranges:   0-24 hours:  <8.0 mg/dL   24-48 hours: <12.0 mg/dL   3-5 days:    <15.0 mg/dL   6-29 days:   <15.0 mg/dL     ALP   Date Value Ref Range Status   04/25/2025 86 55 - 135 unit/L Final     AST   Date Value Ref Range Status   04/25/2025 19 10 - 40 unit/L Final     ALT   Date Value Ref Range Status   04/25/2025 14 10 - 44 unit/L Final     Anion Gap   Date Value Ref Range Status   05/01/2025 7 (L) 8 - 16 mmol/L Final     eGFR if    Date Value Ref Range Status   07/05/2022 >60.0 >60 mL/min/1.73 m^2 Final     eGFR if non    Date Value  Ref Range Status   07/05/2022 >60.0 >60 mL/min/1.73 m^2 Final     Comment:     Calculation used to obtain the estimated glomerular filtration  rate (eGFR) is the CKD-EPI equation.            Specimen (24h ago, onward)      None                  All lab results and imaging results have been reviewed and discussed with the patient.     Assessment:       1. Secondary and unspecified malignant neoplasm of lymph nodes of head, face and neck    2. Lung mass    3. Squamous cell carcinoma of lymph node      Problem List Items Addressed This Visit       Lung mass    Secondary and unspecified malignant neoplasm of lymph nodes of head, face and neck - Primary    Had a long discussion with patient.  He will need treatment ASAP but need to investigate for H&N primary vs esophagus.  Will message ENT and GI to get him seen soon.  Will also get port placed as chemotherapy will certainly be needed and I discussed chemo today and reviewed risks of these medications.  If there is not primary then will likely define this as lung cancer primary.  Need NGS and will preliminarily plan for carbo/taxol/pembro therapy.  Reviewed all this today and discussed with Kit Rico and Jeremiah.           Relevant Medications    benzonatate (TESSALON) 100 MG capsule    Other Relevant Orders    Ambulatory referral/consult to General Surgery    Ambulatory referral/consult to ENT     Other Visit Diagnoses         Squamous cell carcinoma of lymph node               Cancer Staging   No matching staging information was found for the patient.        Plan:         Follow up in about 3 weeks (around 6/20/2025).       The plan was discussed with the patient and all questions/concerns have been answered to the patient's satisfaction.                   [1]   Current Outpatient Medications:     doxycycline (VIBRAMYCIN) 100 MG Cap, Take 1 capsule by mouth twice a day with a meal for 7 days for infection, Disp: 14 capsule, Rfl: 0    evolocumab (REPATHA  SURECLICK) 140 mg/mL PnIj, 1 mL (140 mg total) by abdominal subcutaneous route every 14 (fourteen) days., Disp: 6 mL, Rfl: 3    hydroCHLOROthiazide (HYDRODIURIL) 12.5 MG Tab, Take 1 tablet (12.5 mg total) by mouth every morning., Disp: 90 tablet, Rfl: 3    ondansetron (ZOFRAN-ODT) 4 MG TbDL, Take 1 tablet (4 mg total) by mouth every 8 (eight) hours as needed., Disp: 5 tablet, Rfl: 0    oxyCODONE-acetaminophen (PERCOCET) 5-325 mg per tablet, take 1 tablet by mouth every 4 hours as needed. Use sparingly, Disp: 60 tablet, Rfl: 0    predniSONE (DELTASONE) 5 MG tablet, Take 1 tablet by mouth twice a day for 7 days for cough illness, Disp: 14 tablet, Rfl: 0    rivaroxaban (XARELTO) 20 mg Tab, Take 1 tablet (20 mg total) by mouth once daily., Disp: 90 tablet, Rfl: 3    sotaloL (BETAPACE) 80 MG tablet, Take 1 tablet (80 mg total) by mouth 2 (two) times daily., Disp: 180 tablet, Rfl: 3    terazosin (HYTRIN) 5 MG capsule, Take 1 capsule (5 mg total) by mouth every evening., Disp: 90 capsule, Rfl: 3    benzonatate (TESSALON) 100 MG capsule, Take 1 capsule (100 mg total) by mouth 3 (three) times daily as needed for Cough., Disp: 30 capsule, Rfl: 6

## 2025-05-30 ENCOUNTER — TELEPHONE (OUTPATIENT)
Facility: CLINIC | Age: 80
End: 2025-05-30

## 2025-05-30 ENCOUNTER — OFFICE VISIT (OUTPATIENT)
Dept: CARDIOLOGY | Facility: CLINIC | Age: 80
End: 2025-05-30
Payer: MEDICARE

## 2025-05-30 ENCOUNTER — OFFICE VISIT (OUTPATIENT)
Facility: CLINIC | Age: 80
End: 2025-05-30
Payer: MEDICARE

## 2025-05-30 VITALS
RESPIRATION RATE: 18 BRPM | HEART RATE: 56 BPM | SYSTOLIC BLOOD PRESSURE: 141 MMHG | BODY MASS INDEX: 27.57 KG/M2 | SYSTOLIC BLOOD PRESSURE: 141 MMHG | TEMPERATURE: 98 F | WEIGHT: 186.69 LBS | DIASTOLIC BLOOD PRESSURE: 65 MMHG | BODY MASS INDEX: 27.57 KG/M2 | HEART RATE: 56 BPM | TEMPERATURE: 98 F | DIASTOLIC BLOOD PRESSURE: 65 MMHG | RESPIRATION RATE: 18 BRPM | WEIGHT: 186.69 LBS

## 2025-05-30 VITALS
DIASTOLIC BLOOD PRESSURE: 70 MMHG | HEART RATE: 57 BPM | BODY MASS INDEX: 27.69 KG/M2 | HEIGHT: 69 IN | SYSTOLIC BLOOD PRESSURE: 122 MMHG | WEIGHT: 186.94 LBS | OXYGEN SATURATION: 97 %

## 2025-05-30 DIAGNOSIS — I87.2 VENOUS INSUFFICIENCY: Primary | ICD-10-CM

## 2025-05-30 DIAGNOSIS — D49.7 PARATHYROID TUMOR: ICD-10-CM

## 2025-05-30 DIAGNOSIS — C77.0 SECONDARY AND UNSPECIFIED MALIGNANT NEOPLASM OF LYMPH NODES OF HEAD, FACE AND NECK: Primary | ICD-10-CM

## 2025-05-30 DIAGNOSIS — Z78.9 STATIN INTOLERANCE: ICD-10-CM

## 2025-05-30 DIAGNOSIS — C77.9 SQUAMOUS CELL CARCINOMA OF LYMPH NODE: ICD-10-CM

## 2025-05-30 DIAGNOSIS — J98.59 MEDIASTINAL MASS: ICD-10-CM

## 2025-05-30 DIAGNOSIS — C77.1 SECONDARY AND UNSPECIFIED MALIGNANT NEOPLASM OF INTRATHORACIC LYMPH NODES: ICD-10-CM

## 2025-05-30 DIAGNOSIS — I48.0 PAROXYSMAL ATRIAL FIBRILLATION: Primary | ICD-10-CM

## 2025-05-30 DIAGNOSIS — R91.8 LUNG MASS: ICD-10-CM

## 2025-05-30 DIAGNOSIS — I25.10 CORONARY ARTERY DISEASE INVOLVING NATIVE CORONARY ARTERY OF NATIVE HEART WITHOUT ANGINA PECTORIS: ICD-10-CM

## 2025-05-30 PROBLEM — Z01.818 PREOPERATIVE EVALUATION OF A MEDICAL CONDITION TO RULE OUT SURGICAL CONTRAINDICATIONS (TAR REQUIRED): Status: RESOLVED | Noted: 2023-11-22 | Resolved: 2025-05-30

## 2025-05-30 PROBLEM — E04.2 NONTOXIC MULTINODULAR GOITER: Status: ACTIVE | Noted: 2025-04-24

## 2025-05-30 PROCEDURE — 99213 OFFICE O/P EST LOW 20 MIN: CPT | Mod: PBBFAC,27,PN

## 2025-05-30 PROCEDURE — 99214 OFFICE O/P EST MOD 30 MIN: CPT | Mod: S$PBB,,, | Performed by: NURSE PRACTITIONER

## 2025-05-30 PROCEDURE — 99999 PR PBB SHADOW E&M-EST. PATIENT-LVL III: CPT | Mod: PBBFAC,,, | Performed by: NURSE PRACTITIONER

## 2025-05-30 PROCEDURE — 99214 OFFICE O/P EST MOD 30 MIN: CPT | Mod: PBBFAC,PN | Performed by: INTERNAL MEDICINE

## 2025-05-30 PROCEDURE — 99999 PR PBB SHADOW E&M-EST. PATIENT-LVL III: CPT | Mod: PBBFAC,,,

## 2025-05-30 PROCEDURE — 99213 OFFICE O/P EST LOW 20 MIN: CPT | Mod: PBBFAC,27,PN | Performed by: NURSE PRACTITIONER

## 2025-05-30 PROCEDURE — 99999 PR PBB SHADOW E&M-EST. PATIENT-LVL IV: CPT | Mod: PBBFAC,,, | Performed by: INTERNAL MEDICINE

## 2025-05-30 RX ORDER — CEFAZOLIN SODIUM 2 G/50ML
2 SOLUTION INTRAVENOUS
OUTPATIENT
Start: 2025-05-30

## 2025-05-30 RX ORDER — BENZONATATE 100 MG/1
100 CAPSULE ORAL 3 TIMES DAILY PRN
Qty: 30 CAPSULE | Refills: 6 | Status: SHIPPED | OUTPATIENT
Start: 2025-05-30 | End: 2025-06-09

## 2025-05-30 NOTE — PROGRESS NOTES
Taco Odonnell   [id number]  1945  [address]leg[address]  [address]  [address]  [place] [address]    Dx: p16(-) SCC of unknown primary    HISTORY OF PRESENT ILLNESS:   Mr. Odonnell is a 79M who p/w hoarseness to his PCP 1-2 months ago and was found on CT imaging to have a large L hilar mass as well as diffuse avid mediastinal and cervical LAD on subsequent PET.      FNA of a R neck LN on 25 showed SCC, subseuqent EBUS bx on 25 showed SCC throughout mediastinal LAD.  All samples were p16(-).    He has now been referred to [facility] thoracic clinic for eval/discussion re: tx options.    REVIEW OF SYSTEMS:  A complete ROS was performed and the patient denies any acute changes/concerns other than per HPI.    Past Medical History:   Diagnosis Date    Benign hypertension     Coronary artery disease     Glaucoma     Hyperlipidemia LDL goal < 70     Myocardial infarction     Paroxysmal atrial fibrillation      Past Surgical History:   Procedure Laterality Date    CATARACT EXTRACTION      CORONARY ARTERY BYPASS GRAFT  2003    ENDOBRONCHIAL ULTRASOUND N/A 2025    Procedure: ENDOBRONCHIAL ULTRASOUND (EBUS);  Surgeon: Oren Chaudhari MD;  Location: [facility] ENDO;  Service: Pulmonary;  Laterality: N/A;    LAMINECTOMY N/A 2023    Procedure: LAMINECTOMY, SPINE;  Surgeon: Jhony Gallegos DO;  Location: [facility] OR;  Service: Neurosurgery;  Laterality: N/A;  Bilateral L4-5 Laminectomy with Resection Extradural Mass    ROTATOR CUFF REPAIR  2010     Social History     Socioeconomic History    Marital status: Single    Number of children: 0   Tobacco Use    Smoking status: Former     Current packs/day: 0.00     Average packs/day: 1 pack/day for 40.0 years (40.0 ttl pk-yrs)     Types: Cigarettes     Start date: 1962     Quit date: 2002     Years since quittin.4    Smokeless tobacco: Never   Substance and Sexual Activity    Alcohol use: No    Drug use: No     Family  History   Problem Relation Name Age of Onset    Heart attack Mother      Hyperlipidemia Mother      Dementia Father      Cancer Father          skin    Heart attack Sister      Hyperlipidemia Sister         PRIOR HISTORY OF CHEMOTHERAPY OR RADIOTHERAPY: Please see HPI for patients prior oncologic history.    Medication List with Changes/Refills   New Medications    BENZONATATE (TESSALON) 100 MG CAPSULE    Take 1 capsule (100 mg total) by mouth 3 (three) times daily as needed for Cough.   Current Medications    DOXYCYCLINE (VIBRAMYCIN) 100 MG CAP    Take 1 capsule by mouth twice a day with a meal for 7 days for infection    EVOLOCUMAB (REPATHA SURECLICK) 140 MG/ML PNIJ    1 mL (140 mg total) by abdominal subcutaneous route every 14 (fourteen) days.    HYDROCHLOROTHIAZIDE (HYDRODIURIL) 12.5 MG TAB    Take 1 tablet (12.5 mg total) by mouth every morning.    ONDANSETRON (ZOFRAN-ODT) 4 MG TBDL    Take 1 tablet (4 mg total) by mouth every 8 (eight) hours as needed.    OXYCODONE-ACETAMINOPHEN (PERCOCET) 5-325 MG PER TABLET    take 1 tablet by mouth every 4 hours as needed. Use sparingly    PREDNISONE (DELTASONE) 5 MG TABLET    Take 1 tablet by mouth twice a day for 7 days for cough illness    RIVAROXABAN (XARELTO) 20 MG TAB    Take 1 tablet (20 mg total) by mouth once daily.    SOTALOL (BETAPACE) 80 MG TABLET    Take 1 tablet (80 mg total) by mouth 2 (two) times daily.    TERAZOSIN (HYTRIN) 5 MG CAPSULE    Take 1 capsule (5 mg total) by mouth every evening.     Review of patient's allergies indicates:   Allergen Reactions    Penicillin g Other (See Comments)     Showed on allergy test        QUALITY OF LIFE: 90%- Able to Carry on Normal Activity: Minor Symptoms of Disease    Vitals:    05/30/25 1131   BP: (!) 141/65   Pulse: (!) 56   Resp: 18   Temp: 97.8 °F (36.6 °C)   Weight: 84.7 kg (186 lb 11.2 oz)   PainSc: 0-No pain     Body mass index is 27.57 kg/m².    PHYSICAL EXAM:  GENERAL: alert; in no apparent distress.    HEAD: normocephalic, atraumatic.  EYES: pupils are equal, round, reactive to light and accommodation. Sclera anicteric. Conjunctiva not injected.   NOSE/THROAT: no nasal erythema or rhinorrhea. Oropharynx pink, without erythema, ulcerations or thrush.   NECK: no cervical motion rigidity; supple with no masses.  CHEST: clear to auscultation bilaterally; no wheezes, crackles or rubs. Patient is speaking comfortably on room air with normal work of breathing without using accessory muscles of respiration.  CARDIOVASCULAR: regular rate and rhythm; no murmurs, rubs or gallops.  ABDOMEN: soft, nontender, nondistended. Bowel sounds present.   MUSCULOSKELETAL: no tenderness to palpation along the spine or scapulae. Normal range of motion.  NEUROLOGIC: cranial nerves II-XII intact bilaterally. Strength 5/5 in bilateral upper and lower extremities. No sensory deficits appreciated. Reflexes globally intact. No cerebellar signs. Normal gait.  LYMPHATIC: no cervical, supraclavicular or axillary adenopathy appreciated bilaterally.   EXTREMITIES: no clubbing, cyanosis, edema.  SKIN: no erythema, rashes, ulcerations noted.     REVIEW OF IMAGING/PATHOLOGY/LABS: Please see HPI. All images reviewed personally by dictating physician.       ASSESSMENT: Taco Odonnell Jr is a 79 y.o. male with p16(-) SCC of unknown primary    PLAN:  After complete review of the patient's chart/hx and eval/discussion w/ the patient today, I explained that w/u via EGD and FFL/DL are warranted to eval for possible esphageal or H&N primary lesions, as these are in the differential along w/ NSCLC-SCC.    The pt is stable at this time and w/ good PS, thus no urgent indications for palliative RT exist currently.  And pending completed w/u would like to reserve the potential for definitive tx either upfront or after some induction systemic tx.    The patient verbalized understanding of the above plan, risks, benefits, and details. Contact info was exchanged,  and the patient was encouraged to contact our clinic with any questions, needs, or concerns.    DISPOSITION: RTC AFTER FURTHER WORKUP    I have personally seen and evaluated this patient. Greater than 50% of this time was spent discussing coordination of care and/or counseling.    PHYSICIAN: Yohannes Garibay III, MD    Thank you for the opportunity to meet and consult with Taco Odonnell Jr.   Please feel free to contact me to discuss the above recommendation further.

## 2025-05-30 NOTE — ASSESSMENT & PLAN NOTE
Had a long discussion with patient.  He will need treatment ASAP but need to investigate for H&N primary vs esophagus.  Will message ENT and GI to get him seen soon.  Will also get port placed as chemotherapy will certainly be needed and I discussed chemo today and reviewed risks of these medications.  If there is not primary then will likely define this as lung cancer primary.  Need NGS and will preliminarily plan for carbo/taxol/pembro therapy.  Reviewed all this today and discussed with Kit Rico and Jeremiah.

## 2025-05-30 NOTE — NURSING
Oncology Navigation   Intake      Treatment                              Acuity      Follow Up  No follow-ups on file.     Met with patient in [facility] clinic today. Care coordinated. Questions answered. Will continue to follow.

## 2025-06-02 ENCOUNTER — TELEPHONE (OUTPATIENT)
Dept: HEMATOLOGY/ONCOLOGY | Facility: CLINIC | Age: 80
End: 2025-06-02
Payer: MEDICARE

## 2025-06-02 ENCOUNTER — HOSPITAL ENCOUNTER (OUTPATIENT)
Dept: RADIOLOGY | Facility: HOSPITAL | Age: 80
Discharge: HOME | End: 2025-06-02
Attending: SURGERY
Payer: MEDICARE

## 2025-06-02 DIAGNOSIS — I87.2 VENOUS INSUFFICIENCY: ICD-10-CM

## 2025-06-02 PROCEDURE — 71046 X-RAY EXAM CHEST 2 VIEWS: CPT | Mod: 26,,, | Performed by: RADIOLOGY

## 2025-06-02 PROCEDURE — 71046 X-RAY EXAM CHEST 2 VIEWS: CPT | Mod: TC

## 2025-06-03 ENCOUNTER — OFFICE VISIT (OUTPATIENT)
Dept: SURGERY | Facility: CLINIC | Age: 80
End: 2025-06-03
Payer: MEDICARE

## 2025-06-03 ENCOUNTER — HOSPITAL ENCOUNTER (OUTPATIENT)
Dept: PREADMISSION TESTING | Facility: HOSPITAL | Age: 80
Discharge: HOME | End: 2025-06-03
Attending: SURGERY
Payer: MEDICARE

## 2025-06-03 VITALS
SYSTOLIC BLOOD PRESSURE: 114 MMHG | DIASTOLIC BLOOD PRESSURE: 67 MMHG | WEIGHT: 186.75 LBS | HEIGHT: 69 IN | BODY MASS INDEX: 27.66 KG/M2 | TEMPERATURE: 98 F | HEART RATE: 64 BPM

## 2025-06-03 DIAGNOSIS — C77.0 SECONDARY AND UNSPECIFIED MALIGNANT NEOPLASM OF LYMPH NODES OF HEAD, FACE AND NECK: ICD-10-CM

## 2025-06-03 DIAGNOSIS — I87.2 VENOUS INSUFFICIENCY: Primary | ICD-10-CM

## 2025-06-03 PROCEDURE — 99214 OFFICE O/P EST MOD 30 MIN: CPT | Mod: PBBFAC,PN | Performed by: SURGERY

## 2025-06-03 PROCEDURE — 93010 ELECTROCARDIOGRAM REPORT: CPT | Mod: ,,, | Performed by: GENERAL PRACTICE

## 2025-06-03 PROCEDURE — 99204 OFFICE O/P NEW MOD 45 MIN: CPT | Mod: S$PBB,AQ,, | Performed by: SURGERY

## 2025-06-03 PROCEDURE — 99999 PR PBB SHADOW E&M-EST. PATIENT-LVL IV: CPT | Mod: PBBFAC,,, | Performed by: SURGERY

## 2025-06-03 NOTE — H&P (VIEW-ONLY)
Subjective:       Patient ID: Taco Odonnell Jr is a 79 y.o. male.    Chief Complaint: Vascular Access Problem      HPI:  79 year old male referred to the office in consultation for port a cath placement.  Advanced squamous cell malignant process involving the head and neck and mediastinum.  He is scheduled to begin infusional therapy soon.  The previous Port-A-Cath in the past.  He has had central line in the past with his coronary artery bypass in 2003.    Past Medical History:   Diagnosis Date    Benign hypertension     Coronary artery disease     Glaucoma     Hyperlipidemia LDL goal < 70     Myocardial infarction 2001    Paroxysmal atrial fibrillation      Past Surgical History:   Procedure Laterality Date    CATARACT EXTRACTION      CORONARY ARTERY BYPASS GRAFT  01/01/2003    ENDOBRONCHIAL ULTRASOUND N/A 5/26/2025    Procedure: ENDOBRONCHIAL ULTRASOUND (EBUS);  Surgeon: Oren Chaudhari MD;  Location: [facility] ENDO;  Service: Pulmonary;  Laterality: N/A;    LAMINECTOMY N/A 11/29/2023    Procedure: LAMINECTOMY, SPINE;  Surgeon: Jhony Gallegos DO;  Location: [facility] OR;  Service: Neurosurgery;  Laterality: N/A;  Bilateral L4-5 Laminectomy with Resection Extradural Mass    ROTATOR CUFF REPAIR  01/01/2010     Review of patient's allergies indicates:   Allergen Reactions    Penicillin g Other (See Comments)     Showed on allergy test      Medication List with Changes/Refills   Current Medications    BENZONATATE (TESSALON) 100 MG CAPSULE    Take 1 capsule (100 mg total) by mouth 3 (three) times daily as needed for Cough.    DOXYCYCLINE (VIBRAMYCIN) 100 MG CAP    Take 1 capsule by mouth twice a day with a meal for 7 days for infection    EVOLOCUMAB (REPATHA SURECLICK) 140 MG/ML PNIJ    1 mL (140 mg total) by abdominal subcutaneous route every 14 (fourteen) days.    HYDROCHLOROTHIAZIDE (HYDRODIURIL) 12.5 MG TAB    Take 1 tablet (12.5 mg total) by mouth every morning.    ONDANSETRON (ZOFRAN-ODT) 4 MG TBDL     Take 1 tablet (4 mg total) by mouth every 8 (eight) hours as needed.    OXYCODONE-ACETAMINOPHEN (PERCOCET) 5-325 MG PER TABLET    take 1 tablet by mouth every 4 hours as needed. Use sparingly    PREDNISONE (DELTASONE) 5 MG TABLET    Take 1 tablet by mouth twice a day for 7 days for cough illness    RIVAROXABAN (XARELTO) 20 MG TAB    Take 1 tablet (20 mg total) by mouth once daily.    SOTALOL (BETAPACE) 80 MG TABLET    Take 1 tablet (80 mg total) by mouth 2 (two) times daily.    TERAZOSIN (HYTRIN) 5 MG CAPSULE    Take 1 capsule (5 mg total) by mouth every evening.     Family History   Problem Relation Name Age of Onset    Heart attack Mother      Hyperlipidemia Mother      Dementia Father      Cancer Father          skin    Heart attack Sister      Hyperlipidemia Sister       Social History     Socioeconomic History    Marital status: Single    Number of children: 0   Tobacco Use    Smoking status: Former     Current packs/day: 0.00     Average packs/day: 1 pack/day for 40.0 years (40.0 ttl pk-yrs)     Types: Cigarettes     Start date: 1962     Quit date: 2002     Years since quittin.4    Smokeless tobacco: Never   Substance and Sexual Activity    Alcohol use: No    Drug use: No         Review of Systems   Constitutional:  Negative for chills and fever.   Respiratory:  Negative for shortness of breath.    Cardiovascular:  Negative for chest pain.       Objective:      Physical Exam  Constitutional:       General: He is not in acute distress.     Appearance: He is not toxic-appearing.   Pulmonary:      Effort: No tachypnea, bradypnea or respiratory distress.   Chest:          Comments: Sternotomy incision appreciated  Abdominal:      General: There is no distension.      Palpations: Abdomen is soft.   Neurological:      Mental Status: He is alert and oriented to person, place, and time.   Psychiatric:         Behavior: Behavior is cooperative.       Assessment/Plan:   Venous insufficiency  -      EKG 12-lead; Standing    Secondary and unspecified malignant neoplasm of lymph nodes of head, face and neck  -     Ambulatory referral/consult to General Surgery    Plan for port a cath placement tomorrow.       I discussed the proposed procedures with the patient including risks, benefits, indications, alternatives and special concerns.  The patient appears to understand and agrees to go ahead with surgery.  I have made no promises, warranties or verbal agreements beyond what was discussed above.    No follow-ups on file.

## 2025-06-04 ENCOUNTER — HOSPITAL ENCOUNTER (OUTPATIENT)
Dept: RADIOLOGY | Facility: HOSPITAL | Age: 80
Discharge: HOME | End: 2025-06-04
Attending: SURGERY | Admitting: SURGERY
Payer: MEDICARE

## 2025-06-04 ENCOUNTER — ANESTHESIA (OUTPATIENT)
Dept: SURGERY | Facility: HOSPITAL | Age: 80
End: 2025-06-04
Payer: MEDICARE

## 2025-06-04 ENCOUNTER — ANESTHESIA EVENT (OUTPATIENT)
Dept: SURGERY | Facility: HOSPITAL | Age: 80
End: 2025-06-04
Payer: MEDICARE

## 2025-06-04 ENCOUNTER — HOSPITAL ENCOUNTER (OUTPATIENT)
Facility: HOSPITAL | Age: 80
Discharge: HOME | End: 2025-06-04
Attending: SURGERY | Admitting: SURGERY
Payer: MEDICARE

## 2025-06-04 VITALS
TEMPERATURE: 98 F | BODY MASS INDEX: 27.55 KG/M2 | RESPIRATION RATE: 16 BRPM | HEART RATE: 57 BPM | SYSTOLIC BLOOD PRESSURE: 163 MMHG | HEIGHT: 69 IN | OXYGEN SATURATION: 97 % | DIASTOLIC BLOOD PRESSURE: 73 MMHG | WEIGHT: 186 LBS

## 2025-06-04 DIAGNOSIS — I87.2 VENOUS INSUFFICIENCY: ICD-10-CM

## 2025-06-04 DIAGNOSIS — R52 PAIN: ICD-10-CM

## 2025-06-04 LAB
OHS QRS DURATION: 82 MS
OHS QTC CALCULATION: 424 MS

## 2025-06-04 PROCEDURE — 25000003 PHARM REV CODE 250: Performed by: SURGERY

## 2025-06-04 PROCEDURE — 63600175 PHARM REV CODE 636 W HCPCS: Performed by: SURGERY

## 2025-06-04 PROCEDURE — 36561 INSERT TUNNELED CV CATH: CPT | Mod: LT,,, | Performed by: SURGERY

## 2025-06-04 PROCEDURE — C1788 PORT, INDWELLING, IMP: HCPCS | Performed by: SURGERY

## 2025-06-04 PROCEDURE — 25000003 PHARM REV CODE 250

## 2025-06-04 PROCEDURE — 71000015 HC POSTOP RECOV 1ST HR: Performed by: SURGERY

## 2025-06-04 PROCEDURE — 77001 FLUOROGUIDE FOR VEIN DEVICE: CPT | Mod: 26,,, | Performed by: SURGERY

## 2025-06-04 PROCEDURE — 36000707: Performed by: SURGERY

## 2025-06-04 PROCEDURE — 71000016 HC POSTOP RECOV ADDL HR: Performed by: SURGERY

## 2025-06-04 PROCEDURE — 37000009 HC ANESTHESIA EA ADD 15 MINS: Performed by: SURGERY

## 2025-06-04 PROCEDURE — 36000706: Performed by: SURGERY

## 2025-06-04 PROCEDURE — 37000008 HC ANESTHESIA 1ST 15 MINUTES: Performed by: SURGERY

## 2025-06-04 PROCEDURE — 63600175 PHARM REV CODE 636 W HCPCS

## 2025-06-04 DEVICE — KIT POWERPORT SINGLE 8FR: Type: IMPLANTABLE DEVICE | Site: CHEST  WALL | Status: FUNCTIONAL

## 2025-06-04 RX ORDER — BUPIVACAINE HYDROCHLORIDE AND EPINEPHRINE 2.5; 5 MG/ML; UG/ML
INJECTION, SOLUTION EPIDURAL; INFILTRATION; INTRACAUDAL; PERINEURAL
Status: DISCONTINUED | OUTPATIENT
Start: 2025-06-04 | End: 2025-06-04 | Stop reason: HOSPADM

## 2025-06-04 RX ORDER — GLUCAGON 1 MG
1 KIT INJECTION
OUTPATIENT
Start: 2025-06-04

## 2025-06-04 RX ORDER — ONDANSETRON HYDROCHLORIDE 2 MG/ML
INJECTION, SOLUTION INTRAVENOUS
Status: DISCONTINUED | OUTPATIENT
Start: 2025-06-04 | End: 2025-06-04

## 2025-06-04 RX ORDER — OXYCODONE HYDROCHLORIDE 5 MG/1
5 TABLET ORAL
Refills: 0 | OUTPATIENT
Start: 2025-06-04

## 2025-06-04 RX ORDER — FENTANYL CITRATE 50 UG/ML
25 INJECTION, SOLUTION INTRAMUSCULAR; INTRAVENOUS EVERY 5 MIN PRN
Refills: 0 | OUTPATIENT
Start: 2025-06-04

## 2025-06-04 RX ORDER — ONDANSETRON HYDROCHLORIDE 2 MG/ML
4 INJECTION, SOLUTION INTRAVENOUS DAILY PRN
OUTPATIENT
Start: 2025-06-04

## 2025-06-04 RX ORDER — HEPARIN SODIUM 1000 [USP'U]/ML
INJECTION, SOLUTION INTRAVENOUS; SUBCUTANEOUS
Status: DISCONTINUED | OUTPATIENT
Start: 2025-06-04 | End: 2025-06-04 | Stop reason: HOSPADM

## 2025-06-04 RX ORDER — LIDOCAINE HYDROCHLORIDE 20 MG/ML
INJECTION INTRAVENOUS
Status: DISCONTINUED | OUTPATIENT
Start: 2025-06-04 | End: 2025-06-04

## 2025-06-04 RX ORDER — CEFAZOLIN 2 G/1
2 INJECTION, POWDER, FOR SOLUTION INTRAMUSCULAR; INTRAVENOUS
Status: COMPLETED | OUTPATIENT
Start: 2025-06-04 | End: 2025-06-04

## 2025-06-04 RX ORDER — DIPHENHYDRAMINE HYDROCHLORIDE 50 MG/ML
12.5 INJECTION, SOLUTION INTRAMUSCULAR; INTRAVENOUS
OUTPATIENT
Start: 2025-06-04

## 2025-06-04 RX ORDER — SODIUM CHLORIDE 9 MG/ML
INJECTION, SOLUTION INTRAVENOUS
Status: COMPLETED | OUTPATIENT
Start: 2025-06-04 | End: 2025-06-04

## 2025-06-04 RX ORDER — BUPIVACAINE 13.3 MG/ML
INJECTION, SUSPENSION, LIPOSOMAL INFILTRATION
Status: DISCONTINUED | OUTPATIENT
Start: 2025-06-04 | End: 2025-06-04 | Stop reason: HOSPADM

## 2025-06-04 RX ORDER — PROPOFOL 10 MG/ML
VIAL (ML) INTRAVENOUS
Status: DISCONTINUED | OUTPATIENT
Start: 2025-06-04 | End: 2025-06-04

## 2025-06-04 RX ADMIN — PROPOFOL 40 MG: 10 INJECTION, EMULSION INTRAVENOUS at 09:06

## 2025-06-04 RX ADMIN — PROPOFOL 50 MG: 10 INJECTION, EMULSION INTRAVENOUS at 09:06

## 2025-06-04 RX ADMIN — GLYCOPYRROLATE 0.2 MG: 0.2 INJECTION, SOLUTION INTRAMUSCULAR; INTRAVITREAL at 09:06

## 2025-06-04 RX ADMIN — LIDOCAINE HYDROCHLORIDE 50 MG: 20 INJECTION, SOLUTION INTRAVENOUS at 09:06

## 2025-06-04 RX ADMIN — CEFAZOLIN 2 G: 2 INJECTION, POWDER, FOR SOLUTION INTRAMUSCULAR; INTRAVENOUS at 09:06

## 2025-06-04 RX ADMIN — SODIUM CHLORIDE 10 MG: 9 INJECTION, SOLUTION INTRAVENOUS at 09:06

## 2025-06-04 RX ADMIN — SODIUM CHLORIDE, SODIUM LACTATE, POTASSIUM CHLORIDE, AND CALCIUM CHLORIDE: .6; .31; .03; .02 INJECTION, SOLUTION INTRAVENOUS at 09:06

## 2025-06-04 RX ADMIN — ONDANSETRON 4 MG: 2 INJECTION INTRAMUSCULAR; INTRAVENOUS at 09:06

## 2025-06-04 NOTE — ANESTHESIA POSTPROCEDURE EVALUATION
Anesthesia Post Evaluation    Patient: Taco Odonnell Jr    Procedure(s) Performed: Procedure(s) (LRB):  [id number]-I-CATH (N/A)    Final Anesthesia Type: general      Patient location during evaluation: PACU  Patient participation: Yes- Able to Participate  Level of consciousness: awake and alert  Post-procedure vital signs: reviewed and stable  Pain management: adequate  Airway patency: patent    PONV status at discharge: No PONV  Anesthetic complications: no      Cardiovascular status: stable  Respiratory status: unassisted  Hydration status: euvolemic  Follow-up not needed.  Comments: VSS              Vitals Value Taken Time   BP  06/04/25 10:19   Temp  06/04/25 10:19   Pulse  06/04/25 10:19   Resp  06/04/25 10:19   SpO2  06/04/25 10:19         No case tracking events are documented in the log.      Pain/Gold Score: No data recorded

## 2025-06-04 NOTE — ANESTHESIA PREPROCEDURE EVALUATION
06/04/2025  Taco Odonnell Jr is a 79 y.o., male.      Problem List[1]    Past Surgical History:   Procedure Laterality Date    CATARACT EXTRACTION      CORONARY ARTERY BYPASS GRAFT  01/01/2003    ENDOBRONCHIAL ULTRASOUND N/A 5/26/2025    Procedure: ENDOBRONCHIAL ULTRASOUND (EBUS);  Surgeon: Oren Chaudhari MD;  Location: [facility] ENDO;  Service: Pulmonary;  Laterality: N/A;    LAMINECTOMY N/A 11/29/2023    Procedure: LAMINECTOMY, SPINE;  Surgeon: Jhony Gallegos DO;  Location: [facility] OR;  Service: Neurosurgery;  Laterality: N/A;  Bilateral L4-5 Laminectomy with Resection Extradural Mass    ROTATOR CUFF REPAIR  01/01/2010        Tobacco Use:  The patient  reports that he quit smoking about 27 years ago. His smoking use included cigarettes. He has never used smokeless tobacco.     Results for orders placed or performed in visit on 06/03/25   EKG 12-lead    Collection Time: 06/03/25 11:40 AM   Result Value Ref Range    QRS Duration 82 ms    [facility] QTC Calculation 424 ms    Narrative    Test Reason : I87.2,    Vent. Rate :  58 BPM     Atrial Rate :  58 BPM     P-R Int : 148 ms          QRS Dur :  82 ms      QT Int : 432 ms       P-R-T Axes :  72  45  71 degrees    QTcB Int : 424 ms    Sinus bradycardia  Nonspecific T wave abnormality  Abnormal ECG  When compared with ECG of 07-Nov-2023 16:05,  T wave inversion less evident in Anterior-lateral leads    Referred By: GEORGE MONTE           Confirmed By:              Lab Results   Component Value Date    WBC 7.44 04/25/2025    HGB 13.4 (L) 04/25/2025    HCT 40.0 04/25/2025    MCV 93 04/25/2025     04/25/2025     BMP  Lab Results   Component Value Date     05/01/2025    K 3.8 05/01/2025     05/01/2025    CO2 28 05/01/2025    BUN 20 05/01/2025    CREATININE 0.7 05/01/2025    CALCIUM 9.4 05/01/2025    ANIONGAP 7 (L) 05/01/2025    GLU 95 05/01/2025      04/25/2025     12/13/2024       Results for orders placed during the hospital encounter of 11/13/23    Echo    Interpretation Summary    Left Ventricle: The left ventricle is normal in size. Normal wall thickness. Normal wall motion. There is normal systolic function with a visually estimated ejection fraction of 65 - 70%. There is normal diastolic function.    Right Ventricle: Normal right ventricular cavity size. Wall thickness is normal. Right ventricle wall motion  is normal. Systolic function is normal.    Left Atrium: Left atrium is mildly dilated.    Right Atrium: Right atrium is mildly dilated.    Aortic Valve: The aortic valve is a trileaflet valve. There is mild aortic valve sclerosis. There is mild aortic regurgitation with a centrally directed jet.    Mitral Valve: There is mild regurgitation with a centrally directed jet.    IVC/SVC: Normal venous pressure at 3 mmHg.              Pre-op Assessment    I have reviewed the Patient Summary Reports.     I have reviewed the Nursing Notes. I have reviewed the NPO Status.   I have reviewed the Medications.     Review of Systems  Anesthesia Hx:  No problems with previous Anesthesia             Denies Family Hx of Anesthesia complications.    Denies Personal Hx of Anesthesia complications.                    Social:  Former Smoker       Hematology/Oncology:  Hematology Normal                  Hematology Comments: Last Xarelto 5/30/25    Current/Recent Cancer. (squamous cell lung CA, head and neck involvement)                EENT/Dental:   Permanent bridges.  Hoarseness for the past month.          Cardiovascular:     Hypertension, well controlled  Past MI (hx of MI 2001) CAD (denies recent symptoms)  asymptomatic CABG/stent (CABG 2003) Dysrhythmias (hx of paroxysmal A fib, sinus on most recent EKG) atrial fibrillation      hyperlipidemia                               Pulmonary:  Pulmonary Normal        Mediastinal lung mass, left hilar  adenopathy.  Hx of thyroid/cervical node biopsy positive for squamous carcinoma.  Patient denies orthopnea but has recently developed a persistent cough when he lies flat.  No dysphagia but a feeling of tightness in his neck when he does swallow.               Hepatic/GI:  Hepatic/GI Normal                    Musculoskeletal:         Spine Disorders: (hx of lumbar stenosis, s/p laminectomy) lumbar Degenerative disease           Neurological:  Neurology Normal                                      Endocrine:  Endocrine Normal                Physical Exam  General: Well nourished, Cooperative, Alert and Oriented    Airway:  Mallampati: III / II  Mouth Opening: Normal  TM Distance: Normal  Tongue: Normal  Neck ROM: Normal ROM    Dental:  Intact    Chest/Lungs:  Clear to auscultation, Normal Respiratory Rate  Very mild left expiratory wheeze.  Heart:  Rate: Normal  Rhythm: Regular Rhythm  Sounds: Normal    Abdomen:  Normal, Soft, Nontender        Anesthesia Plan  Type of Anesthesia, risks & benefits discussed:    Anesthesia Type: Gen Natural Airway  Intra-op Monitoring Plan: Standard ASA Monitors  Post Op Pain Control Plan: IV/PO Opioids PRN  (medical reason for not using multimodal pain management)  Induction:  IV  Airway Plan: Video, Post-Induction  Informed Consent: Informed consent signed with the Patient and all parties understand the risks and agree with anesthesia plan.  All questions answered.   ASA Score: 3  Anesthesia Plan Notes:   GNA -  poss LMA if pt has frequent coughing  POM  Zofran Pepcid    Ready For Surgery From Anesthesia Perspective.     .           [1]   Patient Active Problem List  Diagnosis    Hyperlipidemia LDL goal < 70    Coronary artery disease    Benign hypertension    Glaucoma    Paroxysmal atrial fibrillation    Statin intolerance    Nonspecific abnormal electrocardiogram (ECG) (EKG)    Low back pain    Lumbar stenosis    Status post laminectomy    Lung mass    Secondary and unspecified  malignant neoplasm of intrathoracic lymph nodes    Secondary and unspecified malignant neoplasm of lymph nodes of head, face and neck    Postsurgical aortocoronary bypass status    Nontoxic multinodular goiter

## 2025-06-04 NOTE — PLAN OF CARE
Pt AAOX3, Vital signs stable, CXR done and resulted. Ok to discharge. Pt tolerating oral liquids and voiding without difficulty. IV removed, catheter tip intact, no bleeding noted, gauze dressing applied to site.  Discharge instructions reviewed. Pt verbalizes understanding. Pt discharged home with sister

## 2025-06-04 NOTE — OP NOTE
Surgery Date: 6/4/2025     Surgeons and Role:     * Ronny Sanchez III, MD - Primary    Assisting Surgeon: None    Pre-op Diagnosis:  Venous insufficiency [I87.2]    Post-op Diagnosis:  Post-Op Diagnosis Codes:     * Venous insufficiency [I87.2],     Procedure(s) (LRB):  [id number] (N/A) Port-A-Cath placement via left subclavian vein percutaneous Seldinger access and using intraoperative fluoroscopy to position the catheter tip into the superior vena cava    Anesthesia: Monitor Anesthesia Care    Implants:  Implant Name Type Inv. Item Serial No.  Lot No. LRB No. Used Action   KIT POWERPORT SINGLE 8FR - [id number]  KIT POWERPORT SINGLE 8FR  [employer] [id number] Left 1 Implanted       Operative Findings: The patient was taken to operating room and transferred to the operating room table in the supine position.  Patient was given sedation.  Bilateral neck and chest were sterilely prepped and draped.  The patient was put in Trendelenburg position.  Local anesthetic infiltrated around the anticipated stick site in the left chest.  Large gauge needle was used to percutaneously access the left  subclavian vein.   A guide wire was placed through the needle and into the venous system without any issue.  Under fluoroscopy, the guide wire was seen in the venous system.  The patient was put back in the flat position.   An incision was made at the percutaneous stick site and a subcutaneous pocket was made inferiorly.  Under Seldinger technique a venous sheath was placed in into the venous system over the guide wire.  The catheter was then placed into the venous system through the sheath.   The sheath was then peeled away and discarded.   Under fluoroscopy, the catheter tip was positioned into the superior vena cava.  The catheter was cut to size.   The port was attached to the catheter.  The port was accessed and it flushed and aspirated freely.  The port was then placed into the subcutaneous pocket and  sutured to the chest wall.  The incision was then closed in 2 layers, first with a deep dermal layer of Vicryl followed by a subcuticular 4-0 Monocryl to close the skin.  After that, procedure was finished.  Patient was transferred to the recovery room in stable condition.  Chest x-ray will be performed in the recovery room.     Estimated Blood Loss: 10 mL  Complications none.  Instrument counts correct.  Estimated Blood Loss has been documented.         Specimens:   Specimen (24h ago, onward)      None          * No specimens in log *    [id number]

## 2025-06-04 NOTE — TRANSFER OF CARE
"Anesthesia Transfer of Care Note    Patient: Taco Odonnell Jr    Procedure(s) Performed: Procedure(s) (LRB):  [id number]-CATH (N/A)    Patient location: Other: SAME DAY    Anesthesia Type: MAC    Transport from OR: Transported from OR on room air with adequate spontaneous ventilation    Post pain: adequate analgesia    Post assessment: no apparent anesthetic complications and tolerated procedure well    Post vital signs: stable    Level of consciousness: awake, alert and oriented    Nausea/Vomiting: no nausea/vomiting    Complications: none    Transfer of care protocol was followed      Last vitals: Visit Vitals  BP (!) 163/73 (BP Location: Left arm, Patient Position: Lying)   Pulse (!) 57   Temp 36.6 °C (97.8 °F) (Oral)   Resp 16   Ht 5' 9" (1.753 m)   Wt 84.4 kg (186 lb)   SpO2 97%   BMI 27.47 kg/m²     "

## 2025-06-04 NOTE — DISCHARGE SUMMARY
Discharge Summary  General Surgery      Admit Date: 6/4/2025    Discharge Date :6/4/2025    Attending Physician: Ronny Sanchez III     Discharge Physician: Ronny Sanchez III    Discharged Condition: good    Discharge Diagnosis: Venous insufficiency [I87.2]    Treatments/Procedures: Procedure(s) (LRB):  [id number] (N/A)    Hospital Course: Uneventful; Discharged home from Recovery    Significant Diagnostic Studies: none    Disposition: Home or Self Care    Diet: Regular    Follow up: Office 10-14 days    Activity: No heavy lifting till seen in office.    Patient Instructions:   Current Discharge Medication List        CONTINUE these medications which have NOT CHANGED    Details   ascorbic acid, vitamin C, (VITAMIN C) 250 MG tablet Take 250 mg by mouth once daily.      benzonatate (TESSALON) 100 MG capsule Take 1 capsule (100 mg total) by mouth 3 (three) times daily as needed for Cough.  Qty: 30 capsule, Refills: 6    Associated Diagnoses: Secondary and unspecified malignant neoplasm of lymph nodes of head, face and neck      hydroCHLOROthiazide (HYDRODIURIL) 12.5 MG Tab Take 1 tablet (12.5 mg total) by mouth every morning.  Qty: 90 tablet, Refills: 3    Comments: .  Associated Diagnoses: Paroxysmal atrial fibrillation; Coronary artery disease involving native coronary artery of native heart without angina pectoris; Benign hypertension; Statin intolerance      oxyCODONE-acetaminophen (PERCOCET) 5-325 mg per tablet take 1 tablet by mouth every 4 hours as needed. Use sparingly  Qty: 60 tablet, Refills: 0    Comments: Quantity prescribed more than 7 day supply? Press F2 and select one:[id number]        sotaloL (BETAPACE) 80 MG tablet Take 1 tablet (80 mg total) by mouth 2 (two) times daily.  Qty: 180 tablet, Refills: 3    Associated Diagnoses: Paroxysmal atrial fibrillation; Coronary artery disease involving native coronary artery of native heart without angina pectoris; Benign hypertension; Statin  intolerance      terazosin (HYTRIN) 5 MG capsule Take 1 capsule (5 mg total) by mouth every evening.  Qty: 90 capsule, Refills: 3    Associated Diagnoses: Paroxysmal atrial fibrillation; Coronary artery disease involving native coronary artery of native heart without angina pectoris; Benign hypertension; Statin intolerance      co-enzyme Q-10 50 mg capsule Take 200 mg by mouth 2 (two) times daily.      evolocumab (REPATHA SURECLICK) 140 mg/mL PnIj 1 mL (140 mg total) by abdominal subcutaneous route every 14 (fourteen) days.  Qty: 6 mL, Refills: 3    Associated Diagnoses: Paroxysmal atrial fibrillation; Coronary artery disease involving native coronary artery of native heart without angina pectoris; Benign hypertension; Statin intolerance      ferrous sulfate (FEOSOL) Tab tablet Take 1 tablet by mouth daily with breakfast.      FLAXSEED ORAL Take 1,300 mg by mouth once daily.      magnesium oxide (MAG-OX) 400 mg (241.3 mg magnesium) tablet Take 400 mg by mouth once daily.      multivit-min/folic acid/lutein (CENTRUM SILVER ORAL) Take 1 tablet by mouth.      rivaroxaban (XARELTO) 20 mg Tab Take 1 tablet (20 mg total) by mouth once daily.  Qty: 90 tablet, Refills: 3             Discharge Procedure Orders   Diet Adult Regular     Lifting restrictions   Order Comments: No lifting over twenty pounds for six weeks

## 2025-06-04 NOTE — BRIEF OP NOTE
[facility]  Brief Operative Note    SUMMARY     Surgery Date: 6/4/2025     Surgeons and Role:     * Ronny Sanchez III, MD - Primary    Assisting Surgeon: None    Pre-op Diagnosis:  Venous insufficiency [I87.2]    Post-op Diagnosis:  Post-Op Diagnosis Codes:     * Venous insufficiency [I87.2],     Procedure(s) (LRB):  [id number]-CATH (N/A) Port-A-Cath placement via left subclavian vein percutaneous Seldinger access and using intraoperative fluoroscopy to position the catheter tip into the superior vena cava    Anesthesia: Monitor Anesthesia Care    Implants:  Implant Name Type Inv. Item Serial No.  Lot No. LRB No. Used Action   KIT POWERPORT SINGLE 8FR - [id number]  KIT POWERPORT SINGLE 8FR  [employer] [id number] Left 1 Implanted       Operative Findings: The patient was taken to operating room and transferred to the operating room table in the supine position.  Patient was given sedation.  Bilateral neck and chest were sterilely prepped and draped.  The patient was put in Trendelenburg position.  Local anesthetic infiltrated around the anticipated stick site in the left chest.  Large gauge needle was used to percutaneously access the left  subclavian vein.   A guide wire was placed through the needle and into the venous system without any issue.  Under fluoroscopy, the guide wire was seen in the venous system.  The patient was put back in the flat position.   An incision was made at the percutaneous stick site and a subcutaneous pocket was made inferiorly.  Under Seldinger technique a venous sheath was placed in into the venous system over the guide wire.  The catheter was then placed into the venous system through the sheath.   The sheath was then peeled away and discarded.   Under fluoroscopy, the catheter tip was positioned into the superior vena cava.  The catheter was cut to size.   The port was attached to the catheter.  The port was accessed and it flushed and aspirated  freely.  The port was then placed into the subcutaneous pocket and sutured to the chest wall.  The incision was then closed in 2 layers, first with a deep dermal layer of Vicryl followed by a subcuticular 4-0 Monocryl to close the skin.  After that, procedure was finished.  Patient was transferred to the recovery room in stable condition.  Chest x-ray will be performed in the recovery room.     Estimated Blood Loss: 10 mL  Complications none.  Instrument counts correct.  Estimated Blood Loss has been documented.         Specimens:   Specimen (24h ago, onward)      None          * No specimens in log *    [id number]       5

## 2025-06-05 ENCOUNTER — OFFICE VISIT (OUTPATIENT)
Dept: HEMATOLOGY/ONCOLOGY | Facility: CLINIC | Age: 80
End: 2025-06-05
Payer: MEDICARE

## 2025-06-05 ENCOUNTER — TUMOR BOARD CONFERENCE (OUTPATIENT)
Dept: HEMATOLOGY/ONCOLOGY | Facility: CLINIC | Age: 80
End: 2025-06-05
Payer: MEDICARE

## 2025-06-05 VITALS
OXYGEN SATURATION: 95 % | DIASTOLIC BLOOD PRESSURE: 64 MMHG | HEIGHT: 69 IN | SYSTOLIC BLOOD PRESSURE: 131 MMHG | TEMPERATURE: 98 F | HEART RATE: 55 BPM | RESPIRATION RATE: 15 BRPM | WEIGHT: 188.69 LBS | BODY MASS INDEX: 27.95 KG/M2

## 2025-06-05 DIAGNOSIS — C44.92 SQUAMOUS CELL CARCINOMA METASTATIC TO LYMPH NODES OF HEAD AND NECK: Primary | ICD-10-CM

## 2025-06-05 DIAGNOSIS — C77.0 SQUAMOUS CELL CARCINOMA METASTATIC TO LYMPH NODES OF HEAD AND NECK: Primary | ICD-10-CM

## 2025-06-05 PROCEDURE — 99205 OFFICE O/P NEW HI 60 MIN: CPT | Mod: S$PBB,,, | Performed by: STUDENT IN AN ORGANIZED HEALTH CARE EDUCATION/TRAINING PROGRAM

## 2025-06-05 PROCEDURE — 99214 OFFICE O/P EST MOD 30 MIN: CPT | Mod: PBBFAC,PN | Performed by: STUDENT IN AN ORGANIZED HEALTH CARE EDUCATION/TRAINING PROGRAM

## 2025-06-05 PROCEDURE — 99999 PR PBB SHADOW E&M-EST. PATIENT-LVL IV: CPT | Mod: PBBFAC,,, | Performed by: STUDENT IN AN ORGANIZED HEALTH CARE EDUCATION/TRAINING PROGRAM

## 2025-06-06 ENCOUNTER — DOCUMENTATION ONLY (OUTPATIENT)
Dept: HEMATOLOGY/ONCOLOGY | Facility: CLINIC | Age: 80
End: 2025-06-06
Payer: MEDICARE

## 2025-06-06 DIAGNOSIS — C34.12 MALIGNANT NEOPLASM OF UPPER LOBE OF LEFT LUNG: Primary | ICD-10-CM

## 2025-06-09 ENCOUNTER — LAB VISIT (OUTPATIENT)
Dept: LAB | Facility: HOSPITAL | Age: 80
End: 2025-06-09
Attending: NURSE PRACTITIONER
Payer: MEDICARE

## 2025-06-09 ENCOUNTER — CLINICAL SUPPORT (OUTPATIENT)
Facility: CLINIC | Age: 80
End: 2025-06-09
Payer: MEDICARE

## 2025-06-09 VITALS
WEIGHT: 187 LBS | RESPIRATION RATE: 17 BRPM | BODY MASS INDEX: 27.62 KG/M2 | TEMPERATURE: 98 F | SYSTOLIC BLOOD PRESSURE: 134 MMHG | HEART RATE: 63 BPM | DIASTOLIC BLOOD PRESSURE: 64 MMHG

## 2025-06-09 DIAGNOSIS — C34.12 MALIGNANT NEOPLASM OF UPPER LOBE OF LEFT LUNG: ICD-10-CM

## 2025-06-09 DIAGNOSIS — D84.821 IMMUNODEFICIENCY DUE TO DRUGS: ICD-10-CM

## 2025-06-09 DIAGNOSIS — Z79.899 IMMUNODEFICIENCY DUE TO DRUGS: ICD-10-CM

## 2025-06-09 DIAGNOSIS — Z29.89 IMMUNOTHERAPY: ICD-10-CM

## 2025-06-09 DIAGNOSIS — C34.12 MALIGNANT NEOPLASM OF UPPER LOBE OF LEFT LUNG: Primary | ICD-10-CM

## 2025-06-09 LAB
ABSOLUTE EOSINOPHIL (SMH): 0.29 K/UL
ABSOLUTE MONOCYTE (SMH): 0.9 K/UL (ref 0.3–1)
ABSOLUTE NEUTROPHIL COUNT (SMH): 8.6 K/UL (ref 1.8–7.7)
ALBUMIN SERPL-MCNC: 3.8 G/DL (ref 3.5–5.2)
ALP SERPL-CCNC: 85 UNIT/L (ref 55–135)
ALT SERPL-CCNC: 19 UNIT/L (ref 10–44)
ANION GAP (SMH): 9 MMOL/L (ref 8–16)
AST SERPL-CCNC: 22 UNIT/L (ref 10–40)
BASOPHILS # BLD AUTO: 0.06 K/UL
BASOPHILS NFR BLD AUTO: 0.5 %
BILIRUB SERPL-MCNC: 0.5 MG/DL (ref 0.1–1)
BUN SERPL-MCNC: 15 MG/DL (ref 8–23)
CALCIUM SERPL-MCNC: 10 MG/DL (ref 8.7–10.5)
CHLORIDE SERPL-SCNC: 101 MMOL/L (ref 95–110)
CO2 SERPL-SCNC: 28 MMOL/L (ref 23–29)
CREAT SERPL-MCNC: 0.7 MG/DL (ref 0.5–1.4)
ERYTHROCYTE [DISTWIDTH] IN BLOOD BY AUTOMATED COUNT: 12.3 % (ref 11.5–14.5)
GFR SERPLBLD CREATININE-BSD FMLA CKD-EPI: >60 ML/MIN/1.73/M2
GLUCOSE SERPL-MCNC: 119 MG/DL (ref 70–110)
HCT VFR BLD AUTO: 39 % (ref 40–54)
HGB BLD-MCNC: 12.7 GM/DL (ref 14–18)
IMM GRANULOCYTES # BLD AUTO: 0.06 K/UL (ref 0–0.04)
IMM GRANULOCYTES NFR BLD AUTO: 0.5 % (ref 0–0.5)
LYMPHOCYTES # BLD AUTO: 1.62 K/UL (ref 1–4.8)
MAGNESIUM SERPL-MCNC: 2.2 MG/DL (ref 1.6–2.6)
MCH RBC QN AUTO: 30.3 PG (ref 27–31)
MCHC RBC AUTO-ENTMCNC: 32.6 G/DL (ref 32–36)
MCV RBC AUTO: 93 FL (ref 82–98)
NUCLEATED RBC (/100WBC) (SMH): 0 /100 WBC
PLATELET # BLD AUTO: 325 K/UL (ref 150–450)
PMV BLD AUTO: 9.5 FL (ref 9.2–12.9)
POTASSIUM SERPL-SCNC: 4.6 MMOL/L (ref 3.5–5.1)
PROT SERPL-MCNC: 7.2 GM/DL (ref 6–8.4)
RBC # BLD AUTO: 4.19 M/UL (ref 4.6–6.2)
RELATIVE EOSINOPHIL (SMH): 2.5 % (ref 0–8)
RELATIVE LYMPHOCYTE (SMH): 14 % (ref 18–48)
RELATIVE MONOCYTE (SMH): 7.8 % (ref 4–15)
RELATIVE NEUTROPHIL (SMH): 74.7 % (ref 38–73)
SODIUM SERPL-SCNC: 138 MMOL/L (ref 136–145)
TSH SERPL-ACNC: 1.83 UIU/ML (ref 0.34–5.6)
WBC # BLD AUTO: 11.57 K/UL (ref 3.9–12.7)

## 2025-06-09 PROCEDURE — G2211 COMPLEX E/M VISIT ADD ON: HCPCS | Mod: ,,, | Performed by: NURSE PRACTITIONER

## 2025-06-09 PROCEDURE — 99213 OFFICE O/P EST LOW 20 MIN: CPT | Mod: PBBFAC,PN | Performed by: NURSE PRACTITIONER

## 2025-06-09 PROCEDURE — 85025 COMPLETE CBC W/AUTO DIFF WBC: CPT

## 2025-06-09 PROCEDURE — 83735 ASSAY OF MAGNESIUM: CPT

## 2025-06-09 PROCEDURE — 99999 PR PBB SHADOW E&M-EST. PATIENT-LVL III: CPT | Mod: PBBFAC,,, | Performed by: NURSE PRACTITIONER

## 2025-06-09 PROCEDURE — 84443 ASSAY THYROID STIM HORMONE: CPT

## 2025-06-09 PROCEDURE — 99215 OFFICE O/P EST HI 40 MIN: CPT | Mod: S$PBB,,, | Performed by: NURSE PRACTITIONER

## 2025-06-09 PROCEDURE — 36415 COLL VENOUS BLD VENIPUNCTURE: CPT

## 2025-06-09 PROCEDURE — 82247 BILIRUBIN TOTAL: CPT

## 2025-06-09 NOTE — ASSESSMENT & PLAN NOTE
[facility] sent for molecular testing. Patient starting treatment with Taxol, Carboplatin and Keytruda 6/11/2025. Port placed and healing well   stable

## 2025-06-09 NOTE — PROGRESS NOTES
FOLLOW-UP APPOINTMENT    PATIENT:   Taco Odonnell Jr  :    1945  MR#:    [id number]  DATE OF VISIT:  2025      Chief Complaint: Chemo School/Lung Cancer    HPI:   Mr. Taco Odonnell Jr presents today for chemotherapy education.  He will be starting treatment with Taxol, Carboplatin and Keytruda for the above diagnosis.     Reviewed tumor board note with the patient and recs for treatment as a Lung cancer Primary. [facility] testing sent 2025    Review of Systems   Constitutional:  Positive for fatigue and unexpected weight change. Negative for appetite change.   HENT:   Negative for mouth sores.    Respiratory:  Negative for cough and shortness of breath.    Cardiovascular:  Negative for chest pain.   Gastrointestinal:  Negative for abdominal pain and diarrhea.   Genitourinary:  Negative for frequency.    Musculoskeletal:  Negative for back pain.   Skin:  Negative for rash.   Neurological:  Negative for headaches.   Hematological:  Negative for adenopathy.   Psychiatric/Behavioral:  The patient is not nervous/anxious.        Oncology History   Malignant neoplasm of upper lobe of left lung   2025 Initial Diagnosis    Malignant neoplasm of upper lobe of left lung     2025 -  Chemotherapy    Treatment Summary   Plan Name: OP NSCLC CARBOPLATIN (AUC) PACLITAXEL PEMBROLIZUMAB Q3W FOLLOWED BY MAINTENANCE PEMBROLIZUMAB 200 MG Q3W  Treatment Goal: Control  Status: Active  Start Date: 2025 (Planned)  End Date: 2027 (Planned)  Provider: Kyree Puckett MD  Chemotherapy: CARBOPLATIN INFUSION (BY AUC), , Intravenous, Clinic/HOD 1 time, 0 of 4 cycles  PACLITAXEL INFUSION, 200 mg/m2, Intravenous, Clinic/HOD 1 time, 0 of 4 cycles         Problem List[1]    Past Medical History:   Diagnosis Date    Benign hypertension     Coronary artery disease     Gall stones     Glaucoma     Hyperlipidemia LDL goal < 70     Mediastinal mass 2025    Myocardial infarction     Paroxysmal atrial  fibrillation        Past Surgical History:   Procedure Laterality Date    CATARACT EXTRACTION      CORONARY ARTERY BYPASS GRAFT  01/01/2003    ENDOBRONCHIAL ULTRASOUND N/A 5/26/2025    Procedure: ENDOBRONCHIAL ULTRASOUND (EBUS);  Surgeon: Oren Chaudhari MD;  Location: [facility] ENDO;  Service: Pulmonary;  Laterality: N/A;    INSERTION OF TUNNELED CENTRAL VENOUS CATHETER (CVC) WITH SUBCUTANEOUS PORT N/A 6/4/2025    Procedure: [id number];  Surgeon: Ronny Sanchez III, MD;  Location: [facility] OR;  Service: General;  Laterality: N/A;  left subclavian    LAMINECTOMY N/A 11/29/2023    Procedure: LAMINECTOMY, SPINE;  Surgeon: Jhony Gallegos DO;  Location: [facility] OR;  Service: Neurosurgery;  Laterality: N/A;  Bilateral L4-5 Laminectomy with Resection Extradural Mass    ROTATOR CUFF REPAIR  01/01/2010       Social History[2]    Family History   Problem Relation Name Age of Onset    Heart attack Mother      Hyperlipidemia Mother      Dementia Father      Cancer Father          skin    Heart attack Sister      Hyperlipidemia Sister         Current Medications[3]    Review of patient's allergies indicates:   Allergen Reactions    Penicillin g Other (See Comments)     Showed on allergy test        Physcial Examination  VITAL SIGNS:    Body surface area is 2.03 meters squared.   Pain Assessment  Vitals:    06/09/25 1045   BP: 134/64   Pulse: 63   Resp: 17   Temp: 97.9 °F (36.6 °C)   Weight: 84.8 kg (187 lb)   PainSc: 0-No pain        Wt Readings from Last 5 Encounters:   06/09/25 84.8 kg (187 lb)   06/05/25 85.6 kg (188 lb 11.4 oz)   06/04/25 84.4 kg (186 lb)   06/03/25 84.7 kg (186 lb 11.7 oz)   06/03/25 84.7 kg (186 lb 11.7 oz)       GENERAL:  Taco Odonnell Jr is healthy-appearing 79 y.o. male, in no distress.   EYES:   Pupils equal, round, reactive.  Conjunctivae, sclera and lids normal.  HEENT: Head normocephalic and atraumatic, without alopecia.  Oropharynx is unremarkable.  No icterus, jaundice, stomatitis,  mucositis, or ulceration is noted.  Ears are clear and unremarkable.  Nose, nares, and septum are unremarkable.    NECK:   No masses.  Thyroid and trachea are normal.    BREASTS:  Deferred.  RESPIRATORY: Clear to auscultation bilaterally.  Symmetrically effortless expansion.  No wheezing and no stridor.    CV: Heart reveals regular rate and rhythm without murmur, rub, or gallops.  ABDOMEN: Soft, non-tender.  No masses, no hernias, and no rebound or rigidity are noted.  /RECTAL:  Deferred.  LYMPHATICS: No preauricular, submandibular, cervical, supraclavicular, axillary, lymphadenopathy.  MUSCULOSKELETAL:Fair musculature, no atrophy.  No arthritic changes.  No edema or cyanosis. Back is without gross abnormal curvature.   NEUROLOGICAL: Cranial nerves II-XII grossly intact.  Motor and sensory exam intact.  SKIN:   No lesions, bruises, petechiae or rashes.  Good turgor.    PSYCHIATRIC: Patient is alert and oriented to time, place and person.  Mood and affect are appropriate.         Laboratory and Radiology   Lab Results   Component Value Date    WBC 11.57 06/09/2025    RBC 4.19 (L) 06/09/2025    HGB 12.7 (L) 06/09/2025    HCT 39.0 (L) 06/09/2025    MCV 93 06/09/2025    MCH 30.3 06/09/2025    MCHC 32.6 06/09/2025    RDW 12.3 06/09/2025     06/09/2025    MPV 9.5 06/09/2025    GRAN 5.2 12/13/2024    GRAN 69.7 12/13/2024    LYMPH 1.4 12/13/2024    LYMPH 18.9 12/13/2024    MONO 0.6 12/13/2024    MONO 8.0 12/13/2024    EOS 0.2 12/13/2024    BASO 0.06 12/13/2024    EOSINOPHIL 2.3 12/13/2024    BASOPHIL 0.8 12/13/2024     BMP  Lab Results   Component Value Date     06/09/2025    K 4.6 06/09/2025     06/09/2025    CO2 28 06/09/2025    BUN 15 06/09/2025    CREATININE 0.7 06/09/2025    CALCIUM 10.0 06/09/2025    ANIONGAP 9 06/09/2025    ESTGFRAFRICA >60.0 07/05/2022    EGFRNONAA >60.0 07/05/2022     Lab Results   Component Value Date    ALT 19 06/09/2025    AST 22 06/09/2025    ALKPHOS 85 06/09/2025     BILITOT 0.5 06/09/2025     Results for orders placed or performed during the hospital encounter of 05/01/25 (from the past 2160 hours)   CT Chest Without Contrast    Narrative    CMS MANDATED QUALITY DATA - CT RADIATION - 436    All CT scans at this facility utilize dose modulation, iterative reconstruction, and/or weight based dosing when appropriate to reduce radiation dose to as low as reasonably achievable.    EXAMINATION:  CT CHEST WITHOUT CONTRAST    CLINICAL HISTORY:  Cough, persistent; Chronic cough    TECHNIQUE:  Chest CT without IV contrast    COMPARISON:  None    FINDINGS:  The central tracheobronchial tree is patent.  There is biapical pleuroparenchymal thickening and scarring.  There are patchy ground-glass densities of the left upper lobe.  There is dependent subsegmental atelectasis of the lower lobes.  Parenchymal opacity along the medial margin of the right middle lobe could reflect scarring or infiltrate.  3 mm nodule in the posterolateral right lower lobe (series 3, image 196).    The heart is normal size.  The thoracic aorta is normal caliber with calcified plaque formation.  There are enlarged AP window lymph nodes, with largest node measuring 1.7 x 1.1 cm.  No mediastinal mass.  There is left hilar mass versus enlarged lymph node measuring 5.0 x 3.8 cm.    Cholelithiasis noted.  The visualized abdominal viscera are otherwise unremarkable.  There are degenerative changes of the spine.  No acute osseous abnormality.  Median sternotomy wires noted.      Impression    1. Patchy ground-glass opacities of the left upper on a, possibly reflecting bronchopneumonia.  2. Focal parenchymal opacification along the medial margin of the right middle lobe could reflect scarring or infiltrate.  3. Left hilar enlarged lymph node versus mass measures 5.0 x 3.8 cm.  Consider further evaluation with CT chest and IV contrast.  4. 3 mm nodule in the posterolateral right lower lobe.  5. Enlarged mediastinal lymph  nodes are likely reactive.      Electronically signed by: Elpidio Hull  Date:    05/01/2025  Time:    08:32   Results for orders placed or performed during the hospital encounter of 05/01/25 (from the past 2160 hours)   CT Neck Parathyroid (4D)    Narrative    EXAMINATION:  CT NECK PARATHYROID (4D)    CLINICAL HISTORY:  R49.0;  Dysphonia    TECHNIQUE:  Thin axial noncontrast and dynamic postcontrast imaging through the neck was performed with 100 mL Omnipaque 350 IV contrast, per 4D parathyroid protocol, with sagittal and coronal reformatted images reviewed.    COMPARISON:  None.    FINDINGS:  There are several enlarged heterogeneously enhancing bilateral cervical chain lymph nodes, as well as several enlarged supraclavicular and superior mediastinal lymph nodes, including confluent masslike left aortopulmonary window mediastinal lymphadenopathy measuring up to 51 mm maximum dimension.  These have internal hypodense components suggesting necrosis, and are suspicious for metastatic disease.    The thyroid gland is diffusely enlarged with diffuse heterogeneous enhancement, containing multiple ill-defined heterogeneously enhancing nodules.  There is no discrete hyperenhancing parathyroid mass to suggest parathyroid adenoma, however the exam is limited due to the presence of numerous pathologic lymph nodes.    The oropharynx, hypopharynx and larynx enhance normally.  There are carotid arterial vascular calcifications, with the cervical vasculature enhancing normally.  The right vertebral artery dominant.    The salivary glands, cervical musculature and remaining soft tissues enhance normally.  There is paranasal sinus mucosal thickening, with the mastoid air cells normally aerated.  Intervertebral disc space narrowing and facet arthropathy involves the spine, with no acute fractures.      Impression    1. No convincing evidence of parathyroid adenoma, noting that the exam is limited in this regard due to presence of  numerous enlarged, enhancing cervical and superior mediastinal lymph nodes which are suspicious for metastatic disease.  Consider correlation with nuclear medicine parathyroid scan.  2. Several enlarged cervical and superior mediastinal lymph nodes, suspicious for metastatic disease.  Tissue diagnosis is recommended.  3. Masslike lymphadenopathy in the left mediastinum likely involves the recurrent laryngeal nerve, and would explain the patient's hoarseness.      Electronically signed by: Puneet Garay  Date:    05/01/2025  Time:    08:35     Results for orders placed or performed during the hospital encounter of 05/16/25 (from the past 2160 hours)   NM PET CT FDG Whole Body    Impression    Mediastinal mass involving the left AP window node extending into the left hilum with activity consistent with primary malignancy.    Thyroid gland is diffusely enlarged and intensely radiotracer avid.  There is a band of tissue which extends from near the thyroid left lobe through the mediastinum vertically and appears contiguous with the mediastinal mass.  Thyroid malignancy could be considered.    Scattered metastatic lymph nodes in the bilateral level 2 regions, level 3 regions, and left level 4 region of the neck.    Scattered suspected metastatic mediastinal lymph nodes.    Patchy infiltrate consolidation left upper lobe and lingula with ill-defined patchy areas of moderate radiotracer activity.  This is nonspecific and could indicate malignant spread or pneumonia.      Electronically signed by: Beatriz Coleman MD  Date:    05/16/2025  Time:    10:26     No results found for this or any previous visit (from the past 2160 hours).    Pathology  Pathology Results  (Last 10 years)      None            TITLE: PLAN OF CARE FOR THE CHEMOTHERAPY PATIENT / TEACHING PROTOCOL    PURPOSE: To involve the patient / significant other in the plan of care and to provide teaching to the significant other & patient receiving  chemotherapy.    LEVEL: Independent.    CONTENT: The Plan of Care for the chemotherapy patient is individualized and appropriate to the patients needs, strengths, limitations, & goals.  Education includes information regarding chemotherapy side effects, the treatment itself, and self-care  Activities.    GOAL / OUTCOME STANDARDS    PHYSIOLOGIC: The client will remain free or experience minimal side effects or toxicities throughout the chemotherapy treatment period.     PSYCHOLOGIC: The client/significant others will demonstrate positive coping mechanisms in relation to chemotherapy and its side effects.      COGINITIVE: The client/significant others will verbalize understanding of self-care measure to avoid/minimize side effects of the chemotherapy regimen.    EVALUATION / COMMENT KEY:    V = Audiovisual/Video  S = Successfully meets outcome  N = Needs further instruction  NA = Not applicable to the patient  P = Previous knowledge  U = Unable to comprehend  * = See progress notes          PLAN OF CARE  INFORMATION TO BE DELIVERED / NURSING INTERVENTIONS DATE EVALUATION   Assessment of client/caregiver,         knowledge of cancer diagnosis,         and chemotherapy as a treatment. 1a. Evaluate patient/caregiver learning ability    b. Plan teaching sessions with patient/caregiver according to needs and present anxiety level/ability to learn.    c. Provide Chemotherapy Education Packet,        Mouth Care Protocol,         Specific Patient Education Sheets. 06/09/2025 S   Individual chemotherapy treatment         plan. 2a. Review of Chemotherapy Education handout from [email or web address]            06/09/2025   S   Knowledge Deficit & Self-Management of general side effects common to all chemotherapy:  Nausea/Vomiting  b.   Diarrhea  Mouth Care  Dental care  Constipation  Hair Loss  Potential for infection  Fatigue   3a. Reinforce that the majority of side effects from chemotherapy are reversible and are  controlled both in  the hospital and at home        (blood counts recover, hair grows back).   b.  Refer to the following for reinforcement of         information post-treatment:  Mouth Care Protocol.  Bowel Protocol for constipation or diarrhea.  3.  Drug Specific Chemotherapy Information Sheets for each medication patient receiving.    06/09/2025     S     PLAN OF CARE  INFORMATION TO BE DELIVERED / NURSING INTERVENTIONS DATE EVALUATION   h. Potential for bleeding         i. Potential anemia/fatigue         j. Potential sunburn         k. Birth control measures  l. Safety measures post treatment 4.  Chemotherapy Home Care Instruction  and Safety Information Sheet.  A. patient/caregivers to thoroughly cook shellfish (shrimp, crab, etc) to decrease the chance of infection.    B.  Use sunscreen and protective clothing while in the sun.   06/09/2025      Knowledge deficit & Self Management of Drug Specific  Side Effects.    BLADDER EFFECTS        (Hemorrhagic Cystitis)                  Preventable with adequate hydration; occurs 2-3 days or more post treatment.   1.  Instruct patient to:  a.   Void at least every 2 hours; increase intake.  b.   DO NOT hold urine; go when urge is felt.  c.    Empty bladder at bedtime and on         awakening.  d.   Observe for color changes (red to tea           colored), amount and frequency changes.  e.   Notify oncologist of any abnormalities           in urine or voiding or if you cannot               drink adequate fluids.   06/09/2025   S   b.   CHANGES IN URINE        COLOR:      1.   Instruct patient:  a.   Most evident in first 2-3 voidings after           administration.  Lasts less than 24 hours.  If urine is discolored 2 or more days post- treatment, notify oncologist.      06/09/2025 S   c.    KIDNEY EFFECTS           (Nephrotoxicity)   1.  Instruct patient to:  a.   Drink 8-16 glasses of fluid/day the day   pre-treatment and 3-4 days post-treatment to maintain hydration; the best way to  minimize kidney problems.  b.   Notify oncologist immediately if unable to drink fluids or if changes are noted in urinary elimination.     06/09/2025   S   PULMONARY TOXICITY    Instruct patient to report symptoms such as shortness of breath, chest pain, shallow breathing, or chest wall discomfort to physician.  Reinforce preventative measures used by the health care team.  Baseline and periodic PFT and chest x-ray.   06/09/2025   S     PLAN OF CARE INFORMATION TO BE DELIVERED / NURSING INTERVENTIONS DATE EVALUATION   NERVE & MUSCLE EFFECTS (neurotoxocity; neuropathy, possible visual/hearing changes)        Instruct patient to:    Report numbness or tingling of the hands/feet, loss of fine motor movement (buttoning shirt, tying shoelaces), or gait changes to your oncologist.  If numbness/tingling are present:  protect feet with shoes at all times.  Use gloves for washing dishes/gardening & potholders in kitchen.       06/09/2025   S   CARDIOTOXICITY  Decreased effectiveness of             cardiac function. Effective are                  cumulative and irreversible.                                    CARDIAC ARRYTHMIAS              4   Instruct:  Heart function may be tested before treatment and perdiocally during treatment.  Notify oncologist of irregular pulse, palpitations, shortness of breath, or swelling in lower extremities/feet.          Taxol can cause arrhythmias on infusion that resolve once infusion discontinued. Instruct nurse if any irregularity felt.    06/09/2025   S   EXTRAVASTION  Occurs when vesicants leak outside of vein and cause damage to the skin and underlying tissues.   Reinforce preventive measures used to avoid complications.  Fresh IV site or central line monitored continuously with vesicant IVP.  Continuous infusion via central line site and blood return monitored periodically around the clock.  Instruct to:  Notify nurse of any discomfort, burning, stinging, etc. at IV site during  chemotherapy administration.  Notify oncologist of any redness, pain, or swelling at IV site after discharge from hospital.   06/09/2025   S   HYPERSENSITIVITY can happen with any medication.   Instruct patient:  Nurse is with them during the initial part of treatment and will be close by to monitor.  Pre-medication ordered by the oncologist must be taken on time. If doses are missed, treatment will need to be re-scheduled.  Skin redness, itching, or hives appearing after discharge should be reported to oncologist. 06/09/2025   S       PLAN OF CARE INFORMATION TO BE DELIVERED / NURSING INTERVENTIONS DATE EVALUATION   FLU-LIKE SYNDROME      Instruct patient symptoms are hard to prevent and may include fever, shaking chills, muscle and body aches.  Taking prescribed medications from physician if needed.  Adequate fluids are important.    Reinforce the need to call if temperature is         elevated to 100.4 or more  06/09/2025   S   HAND-FOOT SYNDROME  causes painful, symmetric swelling and redness of palms and soles                  Instruct patient to report any numbness or tingling in the hands or feet.  Explain prevention techniques, such as     Use heavy moisturizers to lessen skin dryness and itching, but to avoid if skin is cracked or broken  Bathe in tepid water, use non-perfumed soap, and wash gently. Baths with oatmeal or diluted baking soda may be soothing.  Avoid tight fitting shoes and repetitive actions, such as rubbing hands or applying pressure to hands/feet.  Review measures to take should syndrome occur:  Cold compresses and elevation for          edema  Pain medications and other measures as ordered by oncologist.   4.   Syndrome resolves few weeks after therapy. 06/09/2025   S   5. DISCHARGE PLANNING /        EDUCATION 1.    Explain importance of compliance with follow- up  tests (CBC, CMP).  2.    Verify patient/caregiver know:  a.    Oncologists office phone number.  b.    Dates of follow-up  appointments.  c.    Prescriptions given for nausea  3.   Review side effects to monitor and notify          oncologist about.  4.   Reinforce the need for patient and caregivers to:  a.    Review information given.  b.    Call oncologists office with questions          or symptoms  5.   Provide [facility] [place] [employer] make referrals if needed for financial or .   06/09/2025   S     PROGRESS NOTES: I met with the patient today for chemotherapy education. he will be starting treatment with Taxol, Carboplatin and Keytrruda . We discussed the mechanism of action, potential side effects of this treatment as well as ways he can manage them at home. Some of these side effects include but or not limited to fever, nausea, vomiting, decreased appetite, fatigue, weakness, cytopenias, myalgia/arthralgia, constipation, diarrhea, bleeding, headache, shortness of breath, nail changes, taste change, hair thinning/loss, mood disturbances, or edema. We also discussed dietary modifications he should make although this will be discussed in more detail with the dietician. he was provided with anti-emetic medication, a copy of all of the information we discussed today as well as our contact information. he will be provided a schedule on his first day of treatment. We will obtain labs on a weekly basis and the patient will follow-up with the physician for toxicity monitoring throughout treatment. All questions were answered and an informed consent was obtained. he was reminded to certainly contact us sooner if needed.  Attached to the patients folder and discussed with the patient the 24 hour/ 7 days a week after hours telephone number for the physician.  Patient notified to call anytime 24/7 because their is a physician on call for any problems that may arise.  Patient also notified to report to [facility] if they can not get in touch with a physician after hours.  Discussed the five wishes booklet with the  patient and their family.           Assessment/Plan     ICD-10-CM ICD-9-CM   1. Malignant neoplasm of upper lobe of left lung  C34.12 162.3   2. 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 Patient starting treatment with Taxol, Carboplatin and Keytruda 2025. Port placed and healing well        Medications Ordered:  Zofran 8mg 1 tab PO Q8h prn nausea  Phenergan 25mg PO Q4-6h prn nausea      Standing Labs Ordered:  CBC weekly  CMP weekly    Follow up in about 3 weeks (around 2025) for with Dr. Puckett and 6 weeks with me.    Electronically signed by: Deidra Napoles, MSN, APRN, AGNP-C, OCN      Answers submitted by the patient for this visit:  Review of Systems Questionnaire (Submitted on 2025)  visual disturbance: No         [1]   Patient Active Problem List  Diagnosis    Hyperlipidemia LDL goal < 70    Coronary artery disease    Benign hypertension    Glaucoma    Paroxysmal atrial fibrillation    Statin intolerance    Nonspecific abnormal electrocardiogram (ECG) (EKG)    Low back pain    Lumbar stenosis    Status post laminectomy    Lung mass    Secondary and unspecified malignant neoplasm of intrathoracic lymph nodes    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    Postsurgical aortocoronary bypass status    Nontoxic multinodular goiter    Venous insufficiency    Malignant neoplasm of upper lobe of left lung   [2]   Social History  Socioeconomic History    Marital status: Single    Number of children: 0   Tobacco Use    Smoking status: Former     Current packs/day: 0.00     Types: Cigarettes     Quit date: 1998     Years since quittin.4    Smokeless tobacco: Never   Vaping Use    Vaping status: Never Used   Substance and Sexual Activity    Alcohol use: No    Drug use: No     Social Drivers of Health     Financial Resource Strain: Low  Risk  (6/6/2025)    Overall Financial Resource Strain (CARDIA)     Difficulty of Paying Living Expenses: Not very hard   Food Insecurity: No Food Insecurity (6/6/2025)    Hunger Vital Sign     Worried About Running Out of Food in the Last Year: Never true     Ran Out of Food in the Last Year: Never true   Transportation Needs: No Transportation Needs (6/6/2025)    PRAPARE - Transportation     Lack of Transportation (Medical): No     Lack of Transportation (Non-Medical): No   Physical Activity: Unknown (6/6/2025)    Exercise Vital Sign     Days of Exercise per Week: 0 days   Stress: Stress Concern Present (6/6/2025)    [place] of Occupational Health - Occupational Stress Questionnaire     Feeling of Stress : To some extent   Housing Stability: Low Risk  (6/6/2025)    Housing Stability Vital Sign     Unable to Pay for Housing in the Last Year: No     Homeless in the Last Year: No   [3]   Current Outpatient Medications:     ascorbic acid, vitamin C, (VITAMIN C) 250 MG tablet, Take 250 mg by mouth once daily., Disp: , Rfl:     benzonatate (TESSALON) 100 MG capsule, Take 1 capsule (100 mg total) by mouth 3 (three) times daily as needed for Cough., Disp: 30 capsule, Rfl: 6    co-enzyme Q-10 50 mg capsule, Take 200 mg by mouth 2 (two) times daily., Disp: , Rfl:     evolocumab (REPATHA SURECLICK) 140 mg/mL PnIj, 1 mL (140 mg total) by abdominal subcutaneous route every 14 (fourteen) days., Disp: 6 mL, Rfl: 3    ferrous sulfate (FEOSOL) Tab tablet, Take 1 tablet by mouth daily with breakfast., Disp: , Rfl:     FLAXSEED ORAL, Take 1,300 mg by mouth once daily., Disp: , Rfl:     hydroCHLOROthiazide (HYDRODIURIL) 12.5 MG Tab, Take 1 tablet (12.5 mg total) by mouth every morning., Disp: 90 tablet, Rfl: 3    magnesium oxide (MAG-OX) 400 mg (241.3 mg magnesium) tablet, Take 400 mg by mouth once daily., Disp: , Rfl:     multivit-min/folic acid/lutein (CENTRUM SILVER ORAL), Take 1 tablet by mouth., Disp: , Rfl:      oxyCODONE-acetaminophen (PERCOCET) 5-325 mg per tablet, take 1 tablet by mouth every 4 hours as needed. Use sparingly, Disp: 60 tablet, Rfl: 0    rivaroxaban (XARELTO) 20 mg Tab, Take 1 tablet (20 mg total) by mouth once daily., Disp: 90 tablet, Rfl: 3    sotaloL (BETAPACE) 80 MG tablet, Take 1 tablet (80 mg total) by mouth 2 (two) times daily., Disp: 180 tablet, Rfl: 3    terazosin (HYTRIN) 5 MG capsule, Take 1 capsule (5 mg total) by mouth every evening., Disp: 90 capsule, Rfl: 3

## 2025-06-10 RX ORDER — DIPHENHYDRAMINE HYDROCHLORIDE 50 MG/ML
50 INJECTION, SOLUTION INTRAMUSCULAR; INTRAVENOUS ONCE AS NEEDED
Status: CANCELLED | OUTPATIENT
Start: 2025-06-11

## 2025-06-10 RX ORDER — HEPARIN 100 UNIT/ML
500 SYRINGE INTRAVENOUS
Status: CANCELLED | OUTPATIENT
Start: 2025-06-11

## 2025-06-10 RX ORDER — FAMOTIDINE 10 MG/ML
20 INJECTION, SOLUTION INTRAVENOUS
Status: CANCELLED | OUTPATIENT
Start: 2025-06-11

## 2025-06-10 RX ORDER — SODIUM CHLORIDE 0.9 % (FLUSH) 0.9 %
10 SYRINGE (ML) INJECTION
Status: CANCELLED | OUTPATIENT
Start: 2025-06-11

## 2025-06-10 RX ORDER — EPINEPHRINE 0.3 MG/.3ML
0.3 INJECTION SUBCUTANEOUS ONCE AS NEEDED
Status: CANCELLED | OUTPATIENT
Start: 2025-06-11

## 2025-06-11 ENCOUNTER — INFUSION (OUTPATIENT)
Dept: INFUSION THERAPY | Facility: HOSPITAL | Age: 80
End: 2025-06-11
Attending: INTERNAL MEDICINE
Payer: MEDICARE

## 2025-06-11 ENCOUNTER — DOCUMENTATION ONLY (OUTPATIENT)
Facility: CLINIC | Age: 80
End: 2025-06-11
Payer: MEDICARE

## 2025-06-11 VITALS
WEIGHT: 183 LBS | TEMPERATURE: 98 F | HEART RATE: 58 BPM | OXYGEN SATURATION: 96 % | BODY MASS INDEX: 27.11 KG/M2 | DIASTOLIC BLOOD PRESSURE: 62 MMHG | HEIGHT: 69 IN | RESPIRATION RATE: 18 BRPM | SYSTOLIC BLOOD PRESSURE: 126 MMHG

## 2025-06-11 DIAGNOSIS — T45.1X5A CHEMOTHERAPY INDUCED NAUSEA AND VOMITING: ICD-10-CM

## 2025-06-11 DIAGNOSIS — C34.12 MALIGNANT NEOPLASM OF UPPER LOBE OF LEFT LUNG: Primary | ICD-10-CM

## 2025-06-11 DIAGNOSIS — R11.2 CHEMOTHERAPY INDUCED NAUSEA AND VOMITING: ICD-10-CM

## 2025-06-11 PROCEDURE — 63600175 PHARM REV CODE 636 W HCPCS: Performed by: INTERNAL MEDICINE

## 2025-06-11 PROCEDURE — 96417 CHEMO IV INFUS EACH ADDL SEQ: CPT

## 2025-06-11 PROCEDURE — A4216 STERILE WATER/SALINE, 10 ML: HCPCS | Performed by: INTERNAL MEDICINE

## 2025-06-11 PROCEDURE — 96415 CHEMO IV INFUSION ADDL HR: CPT

## 2025-06-11 PROCEDURE — 96413 CHEMO IV INFUSION 1 HR: CPT

## 2025-06-11 PROCEDURE — 96375 TX/PRO/DX INJ NEW DRUG ADDON: CPT

## 2025-06-11 PROCEDURE — 25000003 PHARM REV CODE 250: Performed by: INTERNAL MEDICINE

## 2025-06-11 PROCEDURE — 96367 TX/PROPH/DG ADDL SEQ IV INF: CPT

## 2025-06-11 RX ORDER — DIPHENHYDRAMINE HYDROCHLORIDE 50 MG/ML
50 INJECTION, SOLUTION INTRAMUSCULAR; INTRAVENOUS ONCE AS NEEDED
Status: DISCONTINUED | OUTPATIENT
Start: 2025-06-11 | End: 2025-06-11 | Stop reason: HOSPADM

## 2025-06-11 RX ORDER — SODIUM CHLORIDE 0.9 % (FLUSH) 0.9 %
10 SYRINGE (ML) INJECTION
Status: DISCONTINUED | OUTPATIENT
Start: 2025-06-11 | End: 2025-06-11 | Stop reason: HOSPADM

## 2025-06-11 RX ORDER — EPINEPHRINE 0.3 MG/.3ML
0.3 INJECTION SUBCUTANEOUS ONCE AS NEEDED
Status: DISCONTINUED | OUTPATIENT
Start: 2025-06-11 | End: 2025-06-11 | Stop reason: HOSPADM

## 2025-06-11 RX ORDER — PROMETHAZINE HYDROCHLORIDE 25 MG/1
25 TABLET ORAL EVERY 6 HOURS PRN
Qty: 30 TABLET | Refills: 6 | Status: SHIPPED | OUTPATIENT
Start: 2025-06-11

## 2025-06-11 RX ORDER — ONDANSETRON 8 MG/1
8 TABLET, ORALLY DISINTEGRATING ORAL EVERY 6 HOURS PRN
Qty: 30 TABLET | Refills: 6 | Status: SHIPPED | OUTPATIENT
Start: 2025-06-11

## 2025-06-11 RX ORDER — FAMOTIDINE 10 MG/ML
20 INJECTION, SOLUTION INTRAVENOUS
Status: COMPLETED | OUTPATIENT
Start: 2025-06-11 | End: 2025-06-11

## 2025-06-11 RX ORDER — HEPARIN 100 UNIT/ML
500 SYRINGE INTRAVENOUS
Status: DISCONTINUED | OUTPATIENT
Start: 2025-06-11 | End: 2025-06-11 | Stop reason: HOSPADM

## 2025-06-11 RX ADMIN — SODIUM CHLORIDE 200 MG: 9 INJECTION, SOLUTION INTRAVENOUS at 07:06

## 2025-06-11 RX ADMIN — PALONOSETRON HYDROCHLORIDE 0.25 MG: 0.25 INJECTION, SOLUTION INTRAVENOUS at 09:06

## 2025-06-11 RX ADMIN — CARBOPLATIN 590 MG: 450 INJECTION, SOLUTION INTRAVENOUS at 12:06

## 2025-06-11 RX ADMIN — PACLITAXEL 408 MG: 6 INJECTION INTRAVENOUS at 09:06

## 2025-06-11 RX ADMIN — HEPARIN 500 UNITS: 100 SYRINGE at 01:06

## 2025-06-11 RX ADMIN — SODIUM CHLORIDE, PRESERVATIVE FREE 10 ML: 5 INJECTION INTRAVENOUS at 01:06

## 2025-06-11 RX ADMIN — SODIUM CHLORIDE: 9 INJECTION, SOLUTION INTRAVENOUS at 07:06

## 2025-06-11 RX ADMIN — FAMOTIDINE 20 MG: 10 INJECTION INTRAVENOUS at 08:06

## 2025-06-11 RX ADMIN — SODIUM CHLORIDE 50 MG: 9 INJECTION, SOLUTION INTRAVENOUS at 08:06

## 2025-06-11 RX ADMIN — APREPITANT 130 MG: 130 INJECTION, EMULSION INTRAVENOUS at 08:06

## 2025-06-11 NOTE — PLAN OF CARE
Problem: Chemotherapy Effects  Goal: Safety Maintained  Outcome: Progressing  Intervention: Promote Safe Chemotherapy Delivery  Flowsheets (Taken 6/11/2025 8497)  Infection Prevention:   hand hygiene promoted   personal protective equipment utilized   equipment surfaces disinfected   rest/sleep promoted  Chemotherapy Environmental Safety:   patient environment monitored for safety   chemotherapy waste containers in room   meticulous hand hygiene promoted   personal protective equipment utilized

## 2025-06-11 NOTE — PROGRESS NOTES
CHEMO SCHOOL- NUTRITION     Taco Odonnell , 79 y.o. male is here for chemotherapy infusion of: Keytruda Taxol Carboplatin. Diagnosis: Lung Cancer    I met with patient for chemo school today. 11 pound wt loss noted in 1 month(5% wt loss). Patient is a lacto-ovo vegetarian. Patient reported that his appetite has declined recently. He mentioned that he is just not as hungry once he found out that he would have to get treatment. He does consume snacks all day and then eats one larger meal at dinner. Daily BM, but he has noticed softer stools. I gave him some nutritional recommendations to help with the softer stools. Patient does adequately hydrate throughout the day, drinking at least 64 fluid ounces daily of water. Patient denied nausea, vomiting, diarrhea, constipation. Educated patient on weight maintenance, food safety, hydration, and increased caloric and protein intake through small meals and snacks. Made goals with patient to add in one supplement of Ensure Complete to better meet higher protein needs. Patient mentioned that he usually eats softer foods since rough-textured foods hurt to swallow. We discussed soft food recipes for meals and snacks; also, gave patient recipe book since he likes to cook and bake. We talked about increasing his calories to prevent further weight loss by adding in calorie-dense foods to meals and snacks. Patient denies having any nutrition related questions or concerns at the current time. RD will continue to monitor any weight changes, appetite, PO intake, and labs.    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   Discussed importance of maintaining wt & staying hydrated.   Reviewed food safety guidelines recommended during treatment.   Emphasized increased protein intake to support recovery.   Discussed alternate sources of hydration  Provided RD contact info & encouraged pt to call with any questions/concerns.   Will f/u as needed.         Electronically signed by: Alisha Mays MS, RDN, LDN

## 2025-06-16 ENCOUNTER — LAB VISIT (OUTPATIENT)
Dept: LAB | Facility: HOSPITAL | Age: 80
End: 2025-06-16
Attending: INTERNAL MEDICINE
Payer: MEDICARE

## 2025-06-16 DIAGNOSIS — C34.12 MALIGNANT NEOPLASM OF UPPER LOBE OF LEFT LUNG: ICD-10-CM

## 2025-06-16 DIAGNOSIS — Z29.89 IMMUNOTHERAPY: ICD-10-CM

## 2025-06-16 LAB
ABSOLUTE EOSINOPHIL (SMH): 0.12 K/UL
ABSOLUTE MONOCYTE (SMH): 0.09 K/UL (ref 0.3–1)
ABSOLUTE NEUTROPHIL COUNT (SMH): 6.1 K/UL (ref 1.8–7.7)
ALBUMIN SERPL-MCNC: 3.7 G/DL (ref 3.5–5.2)
ALP SERPL-CCNC: 79 UNIT/L (ref 55–135)
ALT SERPL-CCNC: 23 UNIT/L (ref 10–44)
ANION GAP (SMH): 7 MMOL/L (ref 8–16)
AST SERPL-CCNC: 21 UNIT/L (ref 10–40)
BASOPHILS # BLD AUTO: 0.02 K/UL
BASOPHILS NFR BLD AUTO: 0.3 %
BILIRUB SERPL-MCNC: 0.6 MG/DL (ref 0.1–1)
BUN SERPL-MCNC: 22 MG/DL (ref 8–23)
CALCIUM SERPL-MCNC: 9.6 MG/DL (ref 8.7–10.5)
CHLORIDE SERPL-SCNC: 99 MMOL/L (ref 95–110)
CO2 SERPL-SCNC: 29 MMOL/L (ref 23–29)
CREAT SERPL-MCNC: 0.7 MG/DL (ref 0.5–1.4)
ERYTHROCYTE [DISTWIDTH] IN BLOOD BY AUTOMATED COUNT: 12.2 % (ref 11.5–14.5)
GFR SERPLBLD CREATININE-BSD FMLA CKD-EPI: >60 ML/MIN/1.73/M2
GLUCOSE SERPL-MCNC: 123 MG/DL (ref 70–110)
HCT VFR BLD AUTO: 37.6 % (ref 40–54)
HGB BLD-MCNC: 12.7 GM/DL (ref 14–18)
IMM GRANULOCYTES # BLD AUTO: 0.03 K/UL (ref 0–0.04)
IMM GRANULOCYTES NFR BLD AUTO: 0.4 % (ref 0–0.5)
LYMPHOCYTES # BLD AUTO: 0.95 K/UL (ref 1–4.8)
MAGNESIUM SERPL-MCNC: 2 MG/DL (ref 1.6–2.6)
MCH RBC QN AUTO: 30.5 PG (ref 27–31)
MCHC RBC AUTO-ENTMCNC: 33.8 G/DL (ref 32–36)
MCV RBC AUTO: 90 FL (ref 82–98)
NUCLEATED RBC (/100WBC) (SMH): 0 /100 WBC
PLATELET # BLD AUTO: 260 K/UL (ref 150–450)
PMV BLD AUTO: 10.9 FL (ref 9.2–12.9)
POTASSIUM SERPL-SCNC: 4.2 MMOL/L (ref 3.5–5.1)
PROT SERPL-MCNC: 7.2 GM/DL (ref 6–8.4)
RBC # BLD AUTO: 4.16 M/UL (ref 4.6–6.2)
RELATIVE EOSINOPHIL (SMH): 1.6 % (ref 0–8)
RELATIVE LYMPHOCYTE (SMH): 12.9 % (ref 18–48)
RELATIVE MONOCYTE (SMH): 1.2 % (ref 4–15)
RELATIVE NEUTROPHIL (SMH): 83.6 % (ref 38–73)
SODIUM SERPL-SCNC: 135 MMOL/L (ref 136–145)
WBC # BLD AUTO: 7.35 K/UL (ref 3.9–12.7)

## 2025-06-16 PROCEDURE — 85025 COMPLETE CBC W/AUTO DIFF WBC: CPT

## 2025-06-16 PROCEDURE — 83735 ASSAY OF MAGNESIUM: CPT

## 2025-06-16 PROCEDURE — 82040 ASSAY OF SERUM ALBUMIN: CPT

## 2025-06-16 PROCEDURE — 36415 COLL VENOUS BLD VENIPUNCTURE: CPT

## 2025-06-19 ENCOUNTER — HOSPITAL ENCOUNTER (EMERGENCY)
Facility: HOSPITAL | Age: 80
Discharge: HOME | End: 2025-06-19
Attending: STUDENT IN AN ORGANIZED HEALTH CARE EDUCATION/TRAINING PROGRAM
Payer: MEDICARE

## 2025-06-19 VITALS
SYSTOLIC BLOOD PRESSURE: 124 MMHG | OXYGEN SATURATION: 95 % | BODY MASS INDEX: 25.92 KG/M2 | HEIGHT: 69 IN | HEART RATE: 83 BPM | DIASTOLIC BLOOD PRESSURE: 60 MMHG | WEIGHT: 175 LBS | RESPIRATION RATE: 16 BRPM | TEMPERATURE: 99 F

## 2025-06-19 DIAGNOSIS — R19.7 DIARRHEA, UNSPECIFIED TYPE: Primary | ICD-10-CM

## 2025-06-19 DIAGNOSIS — R07.9 CHEST PAIN: ICD-10-CM

## 2025-06-19 DIAGNOSIS — R10.9 ABDOMINAL PAIN, UNSPECIFIED ABDOMINAL LOCATION: ICD-10-CM

## 2025-06-19 LAB
ABSOLUTE EOSINOPHIL (SMH): 0 K/UL
ABSOLUTE MONOCYTE (SMH): 1.02 K/UL (ref 0.3–1)
ABSOLUTE NEUTROPHIL COUNT (SMH): 5.9 K/UL (ref 1.8–7.7)
ALBUMIN SERPL-MCNC: 3.6 G/DL (ref 3.5–5.2)
ALP SERPL-CCNC: 95 UNIT/L (ref 55–135)
ALT SERPL-CCNC: 29 UNIT/L (ref 10–44)
ANION GAP (SMH): 10 MMOL/L (ref 8–16)
AST SERPL-CCNC: 31 UNIT/L (ref 10–40)
BASOPHILS # BLD AUTO: 0.02 K/UL
BASOPHILS NFR BLD AUTO: 0.3 %
BILIRUB SERPL-MCNC: 2.2 MG/DL (ref 0.1–1)
BILIRUB UR QL STRIP.AUTO: NEGATIVE
BNP SERPL-MCNC: 201 PG/ML
BUN SERPL-MCNC: 18 MG/DL (ref 8–23)
CALCIUM SERPL-MCNC: 9.4 MG/DL (ref 8.7–10.5)
CHLORIDE SERPL-SCNC: 95 MMOL/L (ref 95–110)
CLARITY UR: CLEAR
CO2 SERPL-SCNC: 25 MMOL/L (ref 23–29)
COLOR UR AUTO: YELLOW
CREAT SERPL-MCNC: 0.8 MG/DL (ref 0.5–1.4)
ERYTHROCYTE [DISTWIDTH] IN BLOOD BY AUTOMATED COUNT: 12.1 % (ref 11.5–14.5)
GFR SERPLBLD CREATININE-BSD FMLA CKD-EPI: >60 ML/MIN/1.73/M2
GLUCOSE SERPL-MCNC: 132 MG/DL (ref 70–110)
GLUCOSE UR QL STRIP: NEGATIVE
HCT VFR BLD AUTO: 37.6 % (ref 40–54)
HGB BLD-MCNC: 12.7 GM/DL (ref 14–18)
HGB UR QL STRIP: NEGATIVE
IMM GRANULOCYTES # BLD AUTO: 0.09 K/UL (ref 0–0.04)
IMM GRANULOCYTES NFR BLD AUTO: 1.1 % (ref 0–0.5)
KETONES UR QL STRIP: NEGATIVE
LEUKOCYTE ESTERASE UR QL STRIP: NEGATIVE
LIPASE SERPL-CCNC: 53 U/L (ref 4–60)
LYMPHOCYTES # BLD AUTO: 0.85 K/UL (ref 1–4.8)
MAGNESIUM SERPL-MCNC: 1.9 MG/DL (ref 1.6–2.6)
MCH RBC QN AUTO: 30.3 PG (ref 27–31)
MCHC RBC AUTO-ENTMCNC: 33.8 G/DL (ref 32–36)
MCV RBC AUTO: 90 FL (ref 82–98)
NITRITE UR QL STRIP: NEGATIVE
NUCLEATED RBC (/100WBC) (SMH): 0 /100 WBC
PH UR STRIP: 7 [PH]
PLATELET # BLD AUTO: 283 K/UL (ref 150–450)
PMV BLD AUTO: 10 FL (ref 9.2–12.9)
POTASSIUM SERPL-SCNC: 3.9 MMOL/L (ref 3.5–5.1)
PROT SERPL-MCNC: 7.3 GM/DL (ref 6–8.4)
PROT UR QL STRIP: ABNORMAL
RBC # BLD AUTO: 4.19 M/UL (ref 4.6–6.2)
RELATIVE EOSINOPHIL (SMH): 0 % (ref 0–8)
RELATIVE LYMPHOCYTE (SMH): 10.8 % (ref 18–48)
RELATIVE MONOCYTE (SMH): 12.9 % (ref 4–15)
RELATIVE NEUTROPHIL (SMH): 74.9 % (ref 38–73)
SODIUM SERPL-SCNC: 130 MMOL/L (ref 136–145)
SP GR UR STRIP: >=1.03
TROPONIN HIGH SENSITIVE (SMH): 11.7 PG/ML
TROPONIN HIGH SENSITIVE (SMH): 12.3 PG/ML
UROBILINOGEN UR STRIP-ACNC: NEGATIVE EU/DL
WBC # BLD AUTO: 7.89 K/UL (ref 3.9–12.7)

## 2025-06-19 PROCEDURE — 81003 URINALYSIS AUTO W/O SCOPE: CPT | Performed by: STUDENT IN AN ORGANIZED HEALTH CARE EDUCATION/TRAINING PROGRAM

## 2025-06-19 PROCEDURE — 96374 THER/PROPH/DIAG INJ IV PUSH: CPT

## 2025-06-19 PROCEDURE — 63600175 PHARM REV CODE 636 W HCPCS: Performed by: STUDENT IN AN ORGANIZED HEALTH CARE EDUCATION/TRAINING PROGRAM

## 2025-06-19 PROCEDURE — 25500020 PHARM REV CODE 255: Performed by: STUDENT IN AN ORGANIZED HEALTH CARE EDUCATION/TRAINING PROGRAM

## 2025-06-19 PROCEDURE — 85025 COMPLETE CBC W/AUTO DIFF WBC: CPT | Performed by: STUDENT IN AN ORGANIZED HEALTH CARE EDUCATION/TRAINING PROGRAM

## 2025-06-19 PROCEDURE — 83690 ASSAY OF LIPASE: CPT | Performed by: STUDENT IN AN ORGANIZED HEALTH CARE EDUCATION/TRAINING PROGRAM

## 2025-06-19 PROCEDURE — 83735 ASSAY OF MAGNESIUM: CPT | Performed by: STUDENT IN AN ORGANIZED HEALTH CARE EDUCATION/TRAINING PROGRAM

## 2025-06-19 PROCEDURE — 84484 ASSAY OF TROPONIN QUANT: CPT | Performed by: STUDENT IN AN ORGANIZED HEALTH CARE EDUCATION/TRAINING PROGRAM

## 2025-06-19 PROCEDURE — 93010 ELECTROCARDIOGRAM REPORT: CPT | Mod: ,,, | Performed by: GENERAL PRACTICE

## 2025-06-19 PROCEDURE — 93005 ELECTROCARDIOGRAM TRACING: CPT | Performed by: GENERAL PRACTICE

## 2025-06-19 PROCEDURE — 82040 ASSAY OF SERUM ALBUMIN: CPT | Performed by: STUDENT IN AN ORGANIZED HEALTH CARE EDUCATION/TRAINING PROGRAM

## 2025-06-19 PROCEDURE — 96361 HYDRATE IV INFUSION ADD-ON: CPT

## 2025-06-19 PROCEDURE — 83880 ASSAY OF NATRIURETIC PEPTIDE: CPT | Performed by: STUDENT IN AN ORGANIZED HEALTH CARE EDUCATION/TRAINING PROGRAM

## 2025-06-19 PROCEDURE — 25000003 PHARM REV CODE 250: Performed by: STUDENT IN AN ORGANIZED HEALTH CARE EDUCATION/TRAINING PROGRAM

## 2025-06-19 PROCEDURE — 99285 EMERGENCY DEPT VISIT HI MDM: CPT | Mod: 25

## 2025-06-19 PROCEDURE — 96375 TX/PRO/DX INJ NEW DRUG ADDON: CPT

## 2025-06-19 RX ORDER — ONDANSETRON HYDROCHLORIDE 2 MG/ML
4 INJECTION, SOLUTION INTRAVENOUS
Status: COMPLETED | OUTPATIENT
Start: 2025-06-19 | End: 2025-06-19

## 2025-06-19 RX ORDER — MORPHINE SULFATE 2 MG/ML
2 INJECTION, SOLUTION INTRAMUSCULAR; INTRAVENOUS
Status: COMPLETED | OUTPATIENT
Start: 2025-06-19 | End: 2025-06-19

## 2025-06-19 RX ORDER — PROMETHAZINE HYDROCHLORIDE 25 MG/1
25 TABLET ORAL EVERY 6 HOURS PRN
Qty: 15 TABLET | Refills: 0 | Status: SHIPPED | OUTPATIENT
Start: 2025-06-19

## 2025-06-19 RX ADMIN — IOHEXOL 100 ML: 350 INJECTION, SOLUTION INTRAVENOUS at 10:06

## 2025-06-19 RX ADMIN — SODIUM CHLORIDE 1000 ML: 9 INJECTION, SOLUTION INTRAVENOUS at 09:06

## 2025-06-19 RX ADMIN — ONDANSETRON 4 MG: 2 INJECTION INTRAMUSCULAR; INTRAVENOUS at 09:06

## 2025-06-19 RX ADMIN — MORPHINE SULFATE 2 MG: 2 INJECTION, SOLUTION INTRAMUSCULAR; INTRAVENOUS at 09:06

## 2025-06-19 NOTE — ED PROVIDER NOTES
Encounter Date: 6/19/2025       History     Chief Complaint   Patient presents with    GEN WEAKNESS     STARTED CHEMO, FEELING WEAK    Abdominal Pain    Diarrhea     HPI    Mr. 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  Patient received his 1st dose of chemotherapy.  Patient felt okay for the 1st 2 days but starting on Saturday and Sunday he developed nausea, vomiting, abdominal pain, and diarrhea.  Denies any associated fevers or chills.  Denies any chest pain or shortness of breath.  States he has been unable to keep much down for the past couple of days.  Denies any blood per rectum or hematemesis.    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    Myocardial infarction 2001    Paroxysmal atrial fibrillation      Past Surgical History:   Procedure Laterality Date    CATARACT EXTRACTION      CORONARY ARTERY BYPASS GRAFT  01/01/2003    ENDOBRONCHIAL ULTRASOUND N/A 5/26/2025    Procedure: ENDOBRONCHIAL ULTRASOUND (EBUS);  Surgeon: Oren Chaudhari MD;  Location: [facility] ENDO;  Service: Pulmonary;  Laterality: N/A;    INSERTION OF TUNNELED CENTRAL VENOUS CATHETER (CVC) WITH SUBCUTANEOUS PORT N/A 6/4/2025    Procedure: TOIUFMLUM-NPVH-U-CATH;  Surgeon: Ronny Sanchez III, MD;  Location: [facility] OR;  Service: General;  Laterality: N/A;  left subclavian    LAMINECTOMY N/A 11/29/2023    Procedure: LAMINECTOMY, SPINE;  Surgeon: Jhony Gallegos DO;  Location: [facility] OR;  Service: Neurosurgery;  Laterality: N/A;  Bilateral L4-5 Laminectomy with Resection Extradural Mass    ROTATOR CUFF REPAIR  01/01/2010     Family History   Problem  Relation Name Age of Onset    Heart attack Mother      Hyperlipidemia Mother      Dementia Father      Cancer Father          skin    Heart attack Sister      Hyperlipidemia Sister       Social History[1]  Review of Systems    As noted above    Physical Exam     Initial Vitals [06/19/25 0750]   BP Pulse Resp Temp SpO2   134/74 89 19 99.4 °F (37.4 °C) 97 %      MAP       --         Physical Exam    Constitutional: He appears well-developed.   HENT:   Head: Normocephalic and atraumatic.   Oropharynx dry   Cardiovascular:  Normal rate.           No murmur heard.  Pulmonary/Chest: Breath sounds normal. No respiratory distress. He has no wheezes. He has no rales.   Abdominal: Abdomen is soft. He exhibits no distension. There is no abdominal tenderness. There is no rebound and no guarding.   Musculoskeletal:         General: No edema. Normal range of motion.     Neurological: He is alert and oriented to person, place, and time.   Skin: Skin is warm and dry. Capillary refill takes less than 2 seconds. No rash noted.         ED Course   Procedures  Labs Reviewed   COMPREHENSIVE METABOLIC PANEL - Abnormal       Result Value    Sodium 130 (*)     Potassium 3.9      Chloride 95      CO2 25      Glucose 132 (*)     BUN 18      Creatinine 0.8      Calcium 9.4      Protein Total 7.3      Albumin 3.6      Bilirubin Total 2.2 (*)     ALP 95      AST 31      ALT 29      Anion Gap 10      eGFR >60     B-TYPE NATRIURETIC PEPTIDE - Abnormal     (*)    CBC WITH DIFFERENTIAL - Abnormal    WBC 7.89      RBC 4.19 (*)     Hgb 12.7 (*)     Hct 37.6 (*)     MCV 90      MCH 30.3      MCHC 33.8      RDW 12.1      Platelet Count 283      MPV 10.0      Nucleated RBC 0      Neut % 74.9 (*)     Lymph % 10.8 (*)     Mono % 12.9      Eos % 0.0      Basophil % 0.3      Imm Grans % 1.1 (*)     Neut # 5.9      Lymph # 0.85 (*)     Mono # 1.02 (*)     Eos # 0.00      Baso # 0.02      Imm Grans # 0.09 (*)    URINALYSIS, REFLEX TO URINE CULTURE -  Abnormal    Color, UA Yellow      Appearance, UA Clear      Spec Grav UA >=1.030 (*)     pH, UA 7.0      Protein, UA Trace (*)     Glucose, UA Negative      Ketones, UA Negative      Blood, UA Negative      Bilirubin, UA Negative      Urobilinogen, UA Negative      Nitrites, UA Negative      Leukocyte Esterase, UA Negative     MAGNESIUM - Normal    Magnesium 1.9     TROPONIN I HIGH SENSITIVITY - Normal    Troponin High Sensitive 11.7     TROPONIN I HIGH SENSITIVITY - Normal    Troponin High Sensitive 12.3     LIPASE - Normal    Lipase Level 53     CBC W/ AUTO DIFFERENTIAL    Narrative:     The following orders were created for panel order CBC auto differential.  Procedure                               Abnormality         Status                     ---------                               -----------         ------                     CBC with Differential[[id number]]       Abnormal            Final result                 Please view results for these tests on the individual orders.     EKG Readings: (Independently Interpreted)   EKG interpreted by me with sinus rhythm with frequent PACs, no significant ST elevation or depression.       ECG Results              EKG 12-lead (In process)        Collection Time Result Time QRS Duration OHS QTC Calculation    06/19/25 08:29:06 06/19/25 09:27:53 84 427                     In process by Interface, Lab In [facility] (06/19/25 09:28:01)                   Narrative:    Test Reason : R07.9,    Vent. Rate : 101 BPM     Atrial Rate : 101 BPM     P-R Int : 132 ms          QRS Dur :  84 ms      QT Int : 330 ms       P-R-T Axes :  69  53  91 degrees    QTcB Int : 427 ms    Sinus tachycardia with Premature atrial complexes  Nonspecific ST and T wave abnormality  Abnormal ECG  When compared with ECG of 03-Jun-2025 11:40,  Premature atrial complexes are now Present  Vent. rate has increased by  43 bpm  T wave inversion now evident in Lateral leads    Referred By: AAAREFERRAL SELF            Confirmed By:                                   Imaging Results              CT Abdomen Pelvis With IV Contrast NO Oral Contrast (Final result)  Result time 06/19/25 10:33:51      Final result by Sara Larkin MD (06/19/25 10:33:51)                   Impression:      Interstitial opacities within the lingula which nonspecific.  Infectious process cannot be excluded graph    Colonic diverticulosis without diverticulitis    Numerous small gallstones without evidence of acute cholecystitis    Bilateral renal cysts      Electronically signed by: Sara Lrakin  Date:    06/19/2025  Time:    10:33               Narrative:      CMS MANDATED QUALITY DATA - CT RADIATION - 436    All CT scans at this facility utilize dose modulation, iterative reconstruction, and/or weight based dosing when appropriate to reduce radiation dose to as low as reasonably achievable.    CLINICAL HISTORY:  ([id number])78 y/o  (1945) M    Abdominal pain, acute, nonlocalized;    TECHNIQUE:  (A#[id number], exam time 6/19/2025 10:27)    CT ABDOMEN PELVIS WITH IV CONTRAST EUM149    Axial CT images of the abdomen and pelvis were obtained from the dome of the diaphragm to the proximal thighs.    100cc omnipague 350 IV contrast was given    COMPARISON:  None available.    FINDINGS:  Lower Thorax:    The lung bases are clear. The heart is normal in size.  Interstitial opacities in within the lingula.  The remainder of the lung bases are clear.  There is no pericardial effusion.    CT Abdomen:    Liver: The liver is normal in size and imaging appearance.    Gallbladder: Numerous gallstones.  There is no gallbladder wall thickening or pericholecystic edema.    Biliary Tree: No intra or extrahepatic ductal dilation.    Spleen: Normal.    Pancreas: The pancreas is normal.    Adrenal Glands: Normal    Kidneys: The kidneys are normal in imaging appearance without hydronephrosis or hydroureter.  Bilateral simple renal cysts.  There are no  renal stones.  The ureters are normal in course and caliber    Vasculature: There are scattered atheromatous mural plaques in the aorta and its major branch vessels with no aneurysm seen.    Lymph nodes: No abdominal lymphadenopathy is seen.    Intraperitoneal structures: There is no ascites.    Bowel: There are no thick-walled or dilated bowel loops.  The appendix is normal.  There is colonic diverticulosis without diverticulitis.    Abdominal wall: The abdominal wall and musculature are normal.    Musculoskeletal: There is degenerative disc disease and facet arthropathy in the lumbar spine with no aggressive appearing lytic or blastic lesions    CT Pelvis:    Bladder: Normal.    Reproductive Organs: The prostate and seminal vesicles are within normal limits.    Pelvic Lymph nodes: No pelvic lymphadenopathy or pelvic mass is identified.  Bilateral fat containing inguinal nodes                                       X-Ray Chest AP Portable (Final result)  Result time 06/19/25 08:32:45      Final result by Hunter Bocanegra MD (06/19/25 08:32:45)                   Impression:      1. Slightly improved airspace disease in the left mid lung.  No other significant interval changes.      Electronically signed by: Hunter Bocanegra  Date:    06/19/2025  Time:    08:32               Narrative:    EXAMINATION:  XR CHEST AP PORTABLE    CLINICAL HISTORY:  Chest Pain;    COMPARISON:  June 4, 2025    FINDINGS:  Heart size is normal.  There has been previous median sternotomy.  Left subclavian Port-A-Cath is unchanged in position.    The right lung is clear.  There is faint airspace disease in the left mid lung zone, slightly improved.  Known suprahilar mass is not well visualized.  Osseous structures are unremarkable.                                       Medications   sodium chloride 0.9% bolus 1,000 mL 1,000 mL (0 mLs Intravenous Stopped 6/19/25 1009)   morphine injection 2 mg (2 mg Intravenous Given 6/19/25 0911)    ondansetron injection 4 mg (4 mg Intravenous Given 6/19/25 0910)   iohexoL (OMNIPAQUE 350) injection 100 mL (100 mLs Intravenous Given 6/19/25 1026)     Medical Decision Making  79-year-old male with history of squamous cell carcinoma of unknown primary recently started on chemotherapy presenting with abdominal pain, nausea, vomiting, diarrhea.  No fever.  Vital signs here are stable.  Exam with some dry oropharynx otherwise unremarkable.  Differential includes gastroenteritis related to chemotherapy, cholecystitis, typhlitis, electrolyte derangement, YI.  Patient given IV fluids and Zofran with improvement of symptoms.  Vital signs have remained stable.  Lab work reviewed with mild bump in his T bili, , and stable anemia.  CT scan does not have any acute abnormality to identify patient's symptoms.  No evidence of cholecystitis or diverticulitis.  X-ray is clear.  EKG interpreted by me with sinus rhythm with frequent PACs, no significant ST elevation or ST depression.  On reassessment patient is tolerating p.o. and feels better.  Patient feels comfortable going home.  Spoke with patient's oncologist who agrees with this plan.  Return precautions were discussed.    Jace Gupta MD  Emergency Medicine      Amount and/or Complexity of Data Reviewed  Labs: ordered.  Radiology: ordered.    Risk  Prescription drug management.                                      Clinical Impression:  Final diagnoses:  [R07.9] Chest pain  [R19.7] Diarrhea, unspecified type (Primary)  [R10.9] Abdominal pain, unspecified abdominal location          ED Disposition Condition    Discharge Stable          ED Prescriptions    None       Follow-up Information       Follow up With Specialties Details Why Contact Info Additional Information    [facility] - Emergency Dept Emergency Medicine  As needed, If symptoms worsen including intractable nausea and vomiting, worsening abdominal pain, fever, other concerns [address]  [facility] [address]  [phone] 1st floor    Rosales Gallegos MD Internal Medicine Schedule an appointment as soon as possible for a visit in 2 days If symptoms do not improve [address] [facility]  [address]  [facility] [address]  [phone]                      [1]   Social History  Tobacco Use    Smoking status: Former     Current packs/day: 0.00     Types: Cigarettes     Quit date: 1998     Years since quittin.4    Smokeless tobacco: Never   Vaping Use    Vaping status: Never Used   Substance Use Topics    Alcohol use: No    Drug use: No        Jace Gupta MD  [id number]

## 2025-06-20 NOTE — PROGRESS NOTES
[facility] HEM/ONC CONSULTATION      Subjective:       Patient ID: Taco Odonnell Jr is a 79 y.o. male.    5/13/2025:  LYMPH NODE, RIGHT CERVICAL, CORE BIOPSY   - POSITIVE FOR SQUAMOUS CELL CARCINOMA   P16 negative    5/26/2025-EBUS:  LN 4L: SCCA  LN 11L: SCCA  LN 7:  SCCA      5/16/2025-PET:  --left mediastinal mass within the sub aortic region AP window and extending into the left hilum measuring 5.7 x 4.2 cm   --multiple radiotracer avid lymph nodes in the level 2, level 3, and left level 4 regions of the neck   --The thyroid gland is enlarged and demonstrates diffuse intense uptake.             Chief Complaint: Squamous call carcinoma of unknown primary    Patient is s/p Cycle 1 Day 1 Taxol Carboplatin and Keytruda. He went to the ER for dehydration and Diarrhea after taking a laxative. He was having constipation taking the Zofran and Phenergan and feels like this is not helping.    The mass on there right side of his neck is getting smaller. He still has some difficulty swallowing.      Per Dr. Puckett's previous note:  Mr. Odonnell is a 78yo male who presented with hoarseness about one month ago.  Her went to his PCP, Dr. Gallegos who worked him up with CT imaging which shows left hilar, MS adenopathy and cervical adenopathy.  Biopsy has been done which shows p16 negative SCCA.  He has now started coughing and had some scant blood with this.      He has no cancer history and quite smoking 25 years ago.   No FH of cancer.    He does have significant sun exposure history.     Patient has history of CAD, AMI, stents which was followed with bypass surgery.  No recent trouble with this.    He is on xarelto for Afib.         Past Medical History:   Diagnosis Date    Benign hypertension     Coronary artery disease     Gall stones     Glaucoma     Hyperlipidemia LDL goal < 70     Mediastinal mass 05/2025    Myocardial infarction 2001    Paroxysmal atrial fibrillation        Past Surgical History:   Procedure Laterality Date     CATARACT EXTRACTION      CORONARY ARTERY BYPASS GRAFT  2003    ENDOBRONCHIAL ULTRASOUND N/A 2025    Procedure: ENDOBRONCHIAL ULTRASOUND (EBUS);  Surgeon: Oren Chaudhari MD;  Location: [facility] ENDO;  Service: Pulmonary;  Laterality: N/A;    INSERTION OF TUNNELED CENTRAL VENOUS CATHETER (CVC) WITH SUBCUTANEOUS PORT N/A 2025    Procedure: [id number];  Surgeon: Ronny Sanchez III, MD;  Location: [facility] OR;  Service: General;  Laterality: N/A;  left subclavian    LAMINECTOMY N/A 2023    Procedure: LAMINECTOMY, SPINE;  Surgeon: Jhony Gallegos DO;  Location: [facility] OR;  Service: Neurosurgery;  Laterality: N/A;  Bilateral L4-5 Laminectomy with Resection Extradural Mass    ROTATOR CUFF REPAIR  2010       Social History     Socioeconomic History    Marital status: Single    Number of children: 0   Tobacco Use    Smoking status: Former     Current packs/day: 0.00     Types: Cigarettes     Quit date: 1998     Years since quittin.4    Smokeless tobacco: Never   Vaping Use    Vaping status: Never Used   Substance and Sexual Activity    Alcohol use: No    Drug use: No     Social Drivers of Health     Financial Resource Strain: Low Risk  (2025)    Overall Financial Resource Strain (CARDIA)     Difficulty of Paying Living Expenses: Not very hard   Food Insecurity: No Food Insecurity (2025)    Hunger Vital Sign     Worried About Running Out of Food in the Last Year: Never true     Ran Out of Food in the Last Year: Never true   Transportation Needs: No Transportation Needs (2025)    PRAPARE - Transportation     Lack of Transportation (Medical): No     Lack of Transportation (Non-Medical): No   Physical Activity: Unknown (2025)    Exercise Vital Sign     Days of Exercise per Week: 0 days   Stress: Stress Concern Present (2025)    [employer] - Occupational Stress Questionnaire     Feeling of Stress : To some extent   Housing  Stability: Low Risk  (6/6/2025)    Housing Stability Vital Sign     Unable to Pay for Housing in the Last Year: No     Homeless in the Last Year: No       Family History   Problem Relation Name Age of Onset    Heart attack Mother      Hyperlipidemia Mother      Dementia Father      Cancer Father          skin    Heart attack Sister      Hyperlipidemia Sister         Review of patient's allergies indicates:   Allergen Reactions    Penicillin g Other (See Comments)     Showed on allergy test      Current Medications[1]    All medications and past history have been reviewed.    Review of Systems   Constitutional:  Positive for fatigue. Negative for fever and unexpected weight change.   HENT:  Negative for nosebleeds.    Respiratory:  Negative for chest tightness and shortness of breath.    Cardiovascular:  Negative for chest pain.   Gastrointestinal:  Positive for constipation and nausea. Negative for abdominal pain and blood in stool.   Genitourinary:  Negative for hematuria.   Musculoskeletal:  Negative for arthralgias and myalgias.   Skin:  Negative for rash.   Neurological:  Positive for weakness and light-headedness. Negative for headaches.   Hematological:  Does not bruise/bleed easily.       Objective:        BP (!) 147/70   Pulse 83   Temp 97.4 °F (36.3 °C)   Resp 18   Wt 82.2 kg (181 lb 3.2 oz)   SpO2 99%   BMI 26.76 kg/m²     Physical Exam  Constitutional:       Appearance: Normal appearance.   HENT:      Head: Normocephalic and atraumatic.   Eyes:      General: No scleral icterus.     Conjunctiva/sclera: Conjunctivae normal.   Cardiovascular:      Rate and Rhythm: Normal rate.   Pulmonary:      Effort: Pulmonary effort is normal.   Abdominal:      General: Abdomen is flat.   Neurological:      General: No focal deficit present.      Mental Status: He is alert and oriented to person, place, and time.   Psychiatric:         Mood and Affect: Mood normal.         Behavior: Behavior normal.            Lab  Recent Results (from the past 2 weeks)   CBC with Differential    Collection Time: 06/23/25 10:25 AM   Result Value Ref Range    WBC 3.27 (L) 3.90 - 12.70 K/uL    Hgb 11.0 (L) 14.0 - 18.0 gm/dL    Hct 32.3 (L) 40.0 - 54.0 %    Platelet Count 310 150 - 450 K/uL   CBC with Differential    Collection Time: 06/19/25  8:33 AM   Result Value Ref Range    WBC 7.89 3.90 - 12.70 K/uL    Hgb 12.7 (L) 14.0 - 18.0 gm/dL    Hct 37.6 (L) 40.0 - 54.0 %    Platelet Count 283 150 - 450 K/uL   CBC with Differential    Collection Time: 06/16/25 11:18 AM   Result Value Ref Range    WBC 7.35 3.90 - 12.70 K/uL    Hgb 12.7 (L) 14.0 - 18.0 gm/dL    Hct 37.6 (L) 40.0 - 54.0 %    Platelet Count 260 150 - 450 K/uL     CMP  Sodium   Date Value Ref Range Status   06/23/2025 136 136 - 145 mmol/L Final     Potassium   Date Value Ref Range Status   06/23/2025 3.6 3.5 - 5.1 mmol/L Final     Chloride   Date Value Ref Range Status   06/23/2025 98 95 - 110 mmol/L Final     CO2   Date Value Ref Range Status   06/23/2025 31 (H) 23 - 29 mmol/L Final     Glucose   Date Value Ref Range Status   06/23/2025 117 (H) 70 - 110 mg/dL Final     BUN   Date Value Ref Range Status   06/23/2025 10 8 - 23 mg/dL Final     Creatinine   Date Value Ref Range Status   06/23/2025 0.6 0.5 - 1.4 mg/dL Final     Calcium   Date Value Ref Range Status   06/23/2025 9.2 8.7 - 10.5 mg/dL Final     Protein Total   Date Value Ref Range Status   06/23/2025 6.8 6.0 - 8.4 gm/dL Final     Albumin   Date Value Ref Range Status   06/23/2025 3.2 (L) 3.5 - 5.2 g/dL Final     Bilirubin Total   Date Value Ref Range Status   06/23/2025 0.4 0.1 - 1.0 mg/dL Final     Comment:     For infants and newborns, interpretation of results should be based   on gestational age, weight and in agreement with clinical   observations.    Premature Infant recommended reference ranges:   0-24 hours:  <8.0 mg/dL   24-48 hours: <12.0 mg/dL   3-5 days:    <15.0 mg/dL   6-29 days:   <15.0 mg/dL     ALP    Date Value Ref Range Status   06/23/2025 111 55 - 135 unit/L Final     AST   Date Value Ref Range Status   06/23/2025 30 10 - 40 unit/L Final     ALT   Date Value Ref Range Status   06/23/2025 66 (H) 10 - 44 unit/L Final     Anion Gap   Date Value Ref Range Status   06/23/2025 7 (L) 8 - 16 mmol/L Final     eGFR if    Date Value Ref Range Status   07/05/2022 >60.0 >60 mL/min/1.73 m^2 Final     eGFR if non    Date Value Ref Range Status   07/05/2022 >60.0 >60 mL/min/1.73 m^2 Final     Comment:     Calculation used to obtain the estimated glomerular filtration  rate (eGFR) is the CKD-EPI equation.            Specimen (24h ago, onward)      None            All lab results and imaging results have been reviewed and discussed with the patient.     Assessment:       1. 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 Secondary and unspecified malignant neoplasm of intrathoracic lymph nodes    3. Nausea    4. Dehydration        Problem List Items Addressed This Visit          Renal/    Dehydration       Oncology    Secondary and unspecified malignant neoplasm of intrathoracic lymph nodes    Patient is s/p cycle 1 Taxol, carboplatin and Keytruda and is tolerating treatment well.          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         Relevant Medications    LORazepam (ATIVAN) 0.5 MG tablet       GI    Nausea    Start Ativan every 8 hours PRN         Relevant Medications    LORazepam (ATIVAN) 0.5 MG tablet        Cancer Staging   No matching staging information was found for the patient.        Plan:       Follow up in about 2 weeks (around 7/7/2025) for with Dr. Puckett and 4 weeks with me.       The plan was discussed with the patient and all questions/concerns have been answered to the patient's satisfaction.    Electronically signed  by Deidra Napoles, MSN, APRN, AGNP-C, OCN               [1]   Current Outpatient Medications:     ascorbic acid, vitamin C, (VITAMIN C) 250 MG tablet, Take 250 mg by mouth once daily., Disp: , Rfl:     co-enzyme Q-10 50 mg capsule, Take 200 mg by mouth 2 (two) times daily., Disp: , Rfl:     evolocumab (REPATHA SURECLICK) 140 mg/mL PnIj, 1 mL (140 mg total) by abdominal subcutaneous route every 14 (fourteen) days., Disp: 6 mL, Rfl: 3    ferrous sulfate (FEOSOL) Tab tablet, Take 1 tablet by mouth daily with breakfast., Disp: , Rfl:     FLAXSEED ORAL, Take 1,300 mg by mouth once daily., Disp: , Rfl:     hydroCHLOROthiazide (HYDRODIURIL) 12.5 MG Tab, Take 1 tablet (12.5 mg total) by mouth every morning., Disp: 90 tablet, Rfl: 3    LORazepam (ATIVAN) 0.5 MG tablet, Take 1 tablet (0.5 mg total) by mouth every 8 (eight) hours as needed for Anxiety., Disp: 30 tablet, Rfl: 2    magnesium oxide (MAG-OX) 400 mg (241.3 mg magnesium) tablet, Take 400 mg by mouth once daily., Disp: , Rfl:     multivit-min/folic acid/lutein (CENTRUM SILVER ORAL), Take 1 tablet by mouth., Disp: , Rfl:     ondansetron (ZOFRAN-ODT) 8 MG TbDL, Take 1 tablet (8 mg total) by mouth every 6 (six) hours as needed (Nausea)., Disp: 30 tablet, Rfl: 6    oxyCODONE-acetaminophen (PERCOCET) 5-325 mg per tablet, take 1 tablet by mouth every 4 hours as needed. Use sparingly, Disp: 60 tablet, Rfl: 0    promethazine (PHENERGAN) 25 MG tablet, Take 1 tablet (25 mg total) by mouth every 6 (six) hours as needed for Nausea., Disp: 30 tablet, Rfl: 6    promethazine (PHENERGAN) 25 MG tablet, Take 1 tablet (25 mg total) by mouth every 6 (six) hours as needed for Nausea., Disp: 15 tablet, Rfl: 0    rivaroxaban (XARELTO) 20 mg Tab, Take 1 tablet (20 mg total) by mouth once daily., Disp: 90 tablet, Rfl: 3    sotaloL (BETAPACE) 80 MG tablet, Take 1 tablet (80 mg total) by mouth 2 (two) times daily., Disp: 180 tablet, Rfl: 3    terazosin (HYTRIN) 5 MG capsule, Take 1 capsule (5  mg total) by mouth every evening., Disp: 90 capsule, Rfl: 3

## 2025-06-21 LAB
OHS QRS DURATION: 84 MS
OHS QTC CALCULATION: 427 MS

## 2025-06-23 ENCOUNTER — OFFICE VISIT (OUTPATIENT)
Facility: CLINIC | Age: 80
End: 2025-06-23
Payer: MEDICARE

## 2025-06-23 ENCOUNTER — LAB VISIT (OUTPATIENT)
Dept: LAB | Facility: HOSPITAL | Age: 80
End: 2025-06-23
Attending: INTERNAL MEDICINE
Payer: MEDICARE

## 2025-06-23 VITALS
TEMPERATURE: 97 F | SYSTOLIC BLOOD PRESSURE: 147 MMHG | OXYGEN SATURATION: 99 % | WEIGHT: 181.19 LBS | HEART RATE: 83 BPM | DIASTOLIC BLOOD PRESSURE: 70 MMHG | RESPIRATION RATE: 18 BRPM | BODY MASS INDEX: 26.76 KG/M2

## 2025-06-23 DIAGNOSIS — C34.12 MALIGNANT NEOPLASM OF UPPER LOBE OF LEFT LUNG: ICD-10-CM

## 2025-06-23 DIAGNOSIS — R11.0 NAUSEA: ICD-10-CM

## 2025-06-23 DIAGNOSIS — E86.0 DEHYDRATION: ICD-10-CM

## 2025-06-23 DIAGNOSIS — Z29.89 IMMUNOTHERAPY: ICD-10-CM

## 2025-06-23 DIAGNOSIS — C77.1 SECONDARY AND UNSPECIFIED MALIGNANT NEOPLASM OF INTRATHORACIC LYMPH NODES: ICD-10-CM

## 2025-06-23 DIAGNOSIS — C44.92 SQUAMOUS CELL CARCINOMA METASTATIC TO LYMPH NODES OF HEAD AND NECK: Primary | ICD-10-CM

## 2025-06-23 DIAGNOSIS — C77.0 SQUAMOUS CELL CARCINOMA METASTATIC TO LYMPH NODES OF HEAD AND NECK: Primary | ICD-10-CM

## 2025-06-23 LAB
ABSOLUTE EOSINOPHIL (SMH): 0.03 K/UL
ABSOLUTE MONOCYTE (SMH): 0.72 K/UL (ref 0.3–1)
ABSOLUTE NEUTROPHIL COUNT (SMH): 1.5 K/UL (ref 1.8–7.7)
ALBUMIN SERPL-MCNC: 3.2 G/DL (ref 3.5–5.2)
ALP SERPL-CCNC: 111 UNIT/L (ref 55–135)
ALT SERPL-CCNC: 66 UNIT/L (ref 10–44)
ANION GAP (SMH): 7 MMOL/L (ref 8–16)
AST SERPL-CCNC: 30 UNIT/L (ref 10–40)
BASOPHILS # BLD AUTO: 0.03 K/UL
BASOPHILS NFR BLD AUTO: 0.9 %
BILIRUB SERPL-MCNC: 0.4 MG/DL (ref 0.1–1)
BUN SERPL-MCNC: 10 MG/DL (ref 8–23)
CALCIUM SERPL-MCNC: 9.2 MG/DL (ref 8.7–10.5)
CHLORIDE SERPL-SCNC: 98 MMOL/L (ref 95–110)
CO2 SERPL-SCNC: 31 MMOL/L (ref 23–29)
CREAT SERPL-MCNC: 0.6 MG/DL (ref 0.5–1.4)
ERYTHROCYTE [DISTWIDTH] IN BLOOD BY AUTOMATED COUNT: 12.3 % (ref 11.5–14.5)
GFR SERPLBLD CREATININE-BSD FMLA CKD-EPI: >60 ML/MIN/1.73/M2
GLUCOSE SERPL-MCNC: 117 MG/DL (ref 70–110)
HCT VFR BLD AUTO: 32.3 % (ref 40–54)
HGB BLD-MCNC: 11 GM/DL (ref 14–18)
IMM GRANULOCYTES # BLD AUTO: 0.07 K/UL (ref 0–0.04)
IMM GRANULOCYTES NFR BLD AUTO: 2.1 % (ref 0–0.5)
LYMPHOCYTES # BLD AUTO: 0.96 K/UL (ref 1–4.8)
MAGNESIUM SERPL-MCNC: 2.1 MG/DL (ref 1.6–2.6)
MCH RBC QN AUTO: 30.3 PG (ref 27–31)
MCHC RBC AUTO-ENTMCNC: 34.1 G/DL (ref 32–36)
MCV RBC AUTO: 89 FL (ref 82–98)
NUCLEATED RBC (/100WBC) (SMH): 0 /100 WBC
PLATELET # BLD AUTO: 310 K/UL (ref 150–450)
PMV BLD AUTO: 9.4 FL (ref 9.2–12.9)
POTASSIUM SERPL-SCNC: 3.6 MMOL/L (ref 3.5–5.1)
PROT SERPL-MCNC: 6.8 GM/DL (ref 6–8.4)
RBC # BLD AUTO: 3.63 M/UL (ref 4.6–6.2)
RELATIVE EOSINOPHIL (SMH): 0.9 % (ref 0–8)
RELATIVE LYMPHOCYTE (SMH): 29.4 % (ref 18–48)
RELATIVE MONOCYTE (SMH): 22 % (ref 4–15)
RELATIVE NEUTROPHIL (SMH): 44.7 % (ref 38–73)
SODIUM SERPL-SCNC: 136 MMOL/L (ref 136–145)
WBC # BLD AUTO: 3.27 K/UL (ref 3.9–12.7)

## 2025-06-23 PROCEDURE — G2211 COMPLEX E/M VISIT ADD ON: HCPCS | Mod: ,,, | Performed by: NURSE PRACTITIONER

## 2025-06-23 PROCEDURE — 36415 COLL VENOUS BLD VENIPUNCTURE: CPT

## 2025-06-23 PROCEDURE — 84520 ASSAY OF UREA NITROGEN: CPT

## 2025-06-23 PROCEDURE — 99999 PR PBB SHADOW E&M-EST. PATIENT-LVL IV: CPT | Mod: PBBFAC,,, | Performed by: NURSE PRACTITIONER

## 2025-06-23 PROCEDURE — 99215 OFFICE O/P EST HI 40 MIN: CPT | Mod: S$PBB,,, | Performed by: NURSE PRACTITIONER

## 2025-06-23 PROCEDURE — 85025 COMPLETE CBC W/AUTO DIFF WBC: CPT

## 2025-06-23 PROCEDURE — 83735 ASSAY OF MAGNESIUM: CPT

## 2025-06-23 PROCEDURE — 99214 OFFICE O/P EST MOD 30 MIN: CPT | Mod: PBBFAC,PN | Performed by: NURSE PRACTITIONER

## 2025-06-23 RX ORDER — HEPARIN 100 UNIT/ML
500 SYRINGE INTRAVENOUS
OUTPATIENT
Start: 2025-06-30

## 2025-06-23 RX ORDER — SODIUM CHLORIDE 0.9 % (FLUSH) 0.9 %
10 SYRINGE (ML) INJECTION
OUTPATIENT
Start: 2025-06-30

## 2025-06-23 RX ORDER — FAMOTIDINE 10 MG/ML
20 INJECTION, SOLUTION INTRAVENOUS
Start: 2025-06-30

## 2025-06-23 RX ORDER — LORAZEPAM 0.5 MG/1
0.5 TABLET ORAL EVERY 8 HOURS PRN
Qty: 30 TABLET | Refills: 2 | Status: SHIPPED | OUTPATIENT
Start: 2025-06-23 | End: 2026-06-23

## 2025-06-30 ENCOUNTER — LAB VISIT (OUTPATIENT)
Dept: LAB | Facility: HOSPITAL | Age: 80
End: 2025-06-30
Attending: INTERNAL MEDICINE
Payer: MEDICARE

## 2025-06-30 DIAGNOSIS — C34.12 MALIGNANT NEOPLASM OF UPPER LOBE OF LEFT LUNG: ICD-10-CM

## 2025-06-30 DIAGNOSIS — Z29.89 IMMUNOTHERAPY: ICD-10-CM

## 2025-06-30 LAB
ABSOLUTE EOSINOPHIL (SMH): 0.02 K/UL
ABSOLUTE MONOCYTE (SMH): 0.59 K/UL (ref 0.3–1)
ABSOLUTE NEUTROPHIL COUNT (SMH): 5.5 K/UL (ref 1.8–7.7)
ALBUMIN SERPL-MCNC: 3.5 G/DL (ref 3.5–5.2)
ALP SERPL-CCNC: 101 UNIT/L (ref 55–135)
ALT SERPL-CCNC: 30 UNIT/L (ref 10–44)
ANION GAP (SMH): 8 MMOL/L (ref 8–16)
AST SERPL-CCNC: 19 UNIT/L (ref 10–40)
BASOPHILS # BLD AUTO: 0.08 K/UL
BASOPHILS NFR BLD AUTO: 1.1 %
BILIRUB SERPL-MCNC: 0.3 MG/DL (ref 0.1–1)
BUN SERPL-MCNC: 11 MG/DL (ref 8–23)
CALCIUM SERPL-MCNC: 9.4 MG/DL (ref 8.7–10.5)
CHLORIDE SERPL-SCNC: 102 MMOL/L (ref 95–110)
CO2 SERPL-SCNC: 28 MMOL/L (ref 23–29)
CREAT SERPL-MCNC: 0.7 MG/DL (ref 0.5–1.4)
ERYTHROCYTE [DISTWIDTH] IN BLOOD BY AUTOMATED COUNT: 12.6 % (ref 11.5–14.5)
GFR SERPLBLD CREATININE-BSD FMLA CKD-EPI: >60 ML/MIN/1.73/M2
GLUCOSE SERPL-MCNC: 137 MG/DL (ref 70–110)
HCT VFR BLD AUTO: 35.9 % (ref 40–54)
HGB BLD-MCNC: 11.9 GM/DL (ref 14–18)
IMM GRANULOCYTES # BLD AUTO: 0.03 K/UL (ref 0–0.04)
IMM GRANULOCYTES NFR BLD AUTO: 0.4 % (ref 0–0.5)
LYMPHOCYTES # BLD AUTO: 1.23 K/UL (ref 1–4.8)
MAGNESIUM SERPL-MCNC: 2.1 MG/DL (ref 1.6–2.6)
MCH RBC QN AUTO: 30.2 PG (ref 27–31)
MCHC RBC AUTO-ENTMCNC: 33.1 G/DL (ref 32–36)
MCV RBC AUTO: 91 FL (ref 82–98)
NUCLEATED RBC (/100WBC) (SMH): 0 /100 WBC
PLATELET # BLD AUTO: 293 K/UL (ref 150–450)
PMV BLD AUTO: 8.9 FL (ref 9.2–12.9)
POTASSIUM SERPL-SCNC: 4.2 MMOL/L (ref 3.5–5.1)
PROT SERPL-MCNC: 7 GM/DL (ref 6–8.4)
RBC # BLD AUTO: 3.94 M/UL (ref 4.6–6.2)
RELATIVE EOSINOPHIL (SMH): 0.3 % (ref 0–8)
RELATIVE LYMPHOCYTE (SMH): 16.5 % (ref 18–48)
RELATIVE MONOCYTE (SMH): 7.9 % (ref 4–15)
RELATIVE NEUTROPHIL (SMH): 73.8 % (ref 38–73)
SODIUM SERPL-SCNC: 138 MMOL/L (ref 136–145)
WBC # BLD AUTO: 7.47 K/UL (ref 3.9–12.7)

## 2025-06-30 PROCEDURE — 84075 ASSAY ALKALINE PHOSPHATASE: CPT

## 2025-06-30 PROCEDURE — 36415 COLL VENOUS BLD VENIPUNCTURE: CPT

## 2025-06-30 PROCEDURE — 85025 COMPLETE CBC W/AUTO DIFF WBC: CPT

## 2025-06-30 PROCEDURE — 83735 ASSAY OF MAGNESIUM: CPT

## 2025-07-01 RX ORDER — SODIUM CHLORIDE 0.9 % (FLUSH) 0.9 %
10 SYRINGE (ML) INJECTION
Status: CANCELLED | OUTPATIENT
Start: 2025-07-02

## 2025-07-01 RX ORDER — HEPARIN 100 UNIT/ML
500 SYRINGE INTRAVENOUS
Status: CANCELLED | OUTPATIENT
Start: 2025-07-02

## 2025-07-01 RX ORDER — FAMOTIDINE 10 MG/ML
20 INJECTION, SOLUTION INTRAVENOUS
Status: CANCELLED | OUTPATIENT
Start: 2025-07-02

## 2025-07-01 RX ORDER — DIPHENHYDRAMINE HYDROCHLORIDE 50 MG/ML
50 INJECTION, SOLUTION INTRAMUSCULAR; INTRAVENOUS ONCE AS NEEDED
Status: CANCELLED | OUTPATIENT
Start: 2025-07-02

## 2025-07-01 RX ORDER — EPINEPHRINE 0.3 MG/.3ML
0.3 INJECTION SUBCUTANEOUS ONCE AS NEEDED
Status: CANCELLED | OUTPATIENT
Start: 2025-07-02

## 2025-07-02 ENCOUNTER — INFUSION (OUTPATIENT)
Dept: INFUSION THERAPY | Facility: HOSPITAL | Age: 80
End: 2025-07-02
Attending: INTERNAL MEDICINE
Payer: MEDICARE

## 2025-07-02 VITALS
HEART RATE: 66 BPM | DIASTOLIC BLOOD PRESSURE: 70 MMHG | RESPIRATION RATE: 16 BRPM | WEIGHT: 181.69 LBS | TEMPERATURE: 98 F | BODY MASS INDEX: 26.91 KG/M2 | HEIGHT: 69 IN | OXYGEN SATURATION: 97 % | SYSTOLIC BLOOD PRESSURE: 125 MMHG

## 2025-07-02 DIAGNOSIS — C34.12 MALIGNANT NEOPLASM OF UPPER LOBE OF LEFT LUNG: Primary | ICD-10-CM

## 2025-07-02 PROCEDURE — 96375 TX/PRO/DX INJ NEW DRUG ADDON: CPT

## 2025-07-02 PROCEDURE — 96417 CHEMO IV INFUS EACH ADDL SEQ: CPT

## 2025-07-02 PROCEDURE — 96415 CHEMO IV INFUSION ADDL HR: CPT

## 2025-07-02 PROCEDURE — 96413 CHEMO IV INFUSION 1 HR: CPT

## 2025-07-02 PROCEDURE — A4216 STERILE WATER/SALINE, 10 ML: HCPCS | Performed by: INTERNAL MEDICINE

## 2025-07-02 PROCEDURE — 96367 TX/PROPH/DG ADDL SEQ IV INF: CPT

## 2025-07-02 PROCEDURE — 63600175 PHARM REV CODE 636 W HCPCS: Performed by: INTERNAL MEDICINE

## 2025-07-02 PROCEDURE — 25000003 PHARM REV CODE 250: Performed by: INTERNAL MEDICINE

## 2025-07-02 RX ORDER — EPINEPHRINE 0.3 MG/.3ML
0.3 INJECTION SUBCUTANEOUS ONCE AS NEEDED
Status: DISCONTINUED | OUTPATIENT
Start: 2025-07-02 | End: 2025-07-02 | Stop reason: HOSPADM

## 2025-07-02 RX ORDER — DIPHENHYDRAMINE HYDROCHLORIDE 50 MG/ML
50 INJECTION, SOLUTION INTRAMUSCULAR; INTRAVENOUS ONCE AS NEEDED
Status: DISCONTINUED | OUTPATIENT
Start: 2025-07-02 | End: 2025-07-02 | Stop reason: HOSPADM

## 2025-07-02 RX ORDER — FAMOTIDINE 10 MG/ML
20 INJECTION, SOLUTION INTRAVENOUS
Status: COMPLETED | OUTPATIENT
Start: 2025-07-02 | End: 2025-07-02

## 2025-07-02 RX ORDER — HEPARIN 100 UNIT/ML
500 SYRINGE INTRAVENOUS
Status: DISCONTINUED | OUTPATIENT
Start: 2025-07-02 | End: 2025-07-02 | Stop reason: HOSPADM

## 2025-07-02 RX ORDER — SODIUM CHLORIDE 0.9 % (FLUSH) 0.9 %
10 SYRINGE (ML) INJECTION
Status: DISCONTINUED | OUTPATIENT
Start: 2025-07-02 | End: 2025-07-02 | Stop reason: HOSPADM

## 2025-07-02 RX ADMIN — SODIUM CHLORIDE: 9 INJECTION, SOLUTION INTRAVENOUS at 07:07

## 2025-07-02 RX ADMIN — SODIUM CHLORIDE, PRESERVATIVE FREE 10 ML: 5 INJECTION INTRAVENOUS at 01:07

## 2025-07-02 RX ADMIN — HEPARIN 500 UNITS: 100 SYRINGE at 01:07

## 2025-07-02 RX ADMIN — SODIUM CHLORIDE 50 MG: 9 INJECTION, SOLUTION INTRAVENOUS at 08:07

## 2025-07-02 RX ADMIN — FAMOTIDINE 20 MG: 10 INJECTION INTRAVENOUS at 08:07

## 2025-07-02 RX ADMIN — PALONOSETRON HYDROCHLORIDE 0.25 MG: 0.25 INJECTION, SOLUTION INTRAVENOUS at 08:07

## 2025-07-02 RX ADMIN — CARBOPLATIN 590 MG: 450 INJECTION, SOLUTION INTRAVENOUS at 12:07

## 2025-07-02 RX ADMIN — SODIUM CHLORIDE 200 MG: 9 INJECTION, SOLUTION INTRAVENOUS at 07:07

## 2025-07-02 RX ADMIN — PACLITAXEL 408 MG: 6 INJECTION INTRAVENOUS at 09:07

## 2025-07-02 RX ADMIN — APREPITANT 130 MG: 130 INJECTION, EMULSION INTRAVENOUS at 08:07

## 2025-07-02 NOTE — PLAN OF CARE
Problem: Chemotherapy Effects  Goal: Safety Maintained  Outcome: Progressing  Intervention: Promote Safe Chemotherapy Delivery  Flowsheets (Taken 7/2/2025 6601)  Infection Prevention:   hand hygiene promoted   rest/sleep promoted   personal protective equipment utilized   environmental surveillance performed   equipment surfaces disinfected  Chemotherapy Environmental Safety:   meticulous hand hygiene promoted   chemotherapy waste containers in room   protective environment maintained   patient environment monitored for safety   personal protective equipment utilized

## 2025-07-03 ENCOUNTER — INFUSION (OUTPATIENT)
Dept: INFUSION THERAPY | Facility: HOSPITAL | Age: 80
End: 2025-07-03
Attending: INTERNAL MEDICINE
Payer: MEDICARE

## 2025-07-03 VITALS
RESPIRATION RATE: 18 BRPM | OXYGEN SATURATION: 97 % | DIASTOLIC BLOOD PRESSURE: 73 MMHG | BODY MASS INDEX: 27.33 KG/M2 | HEART RATE: 51 BPM | TEMPERATURE: 97 F | SYSTOLIC BLOOD PRESSURE: 149 MMHG | WEIGHT: 184.5 LBS | HEIGHT: 69 IN

## 2025-07-03 DIAGNOSIS — E86.0 DEHYDRATION: Primary | ICD-10-CM

## 2025-07-03 DIAGNOSIS — C34.12 MALIGNANT NEOPLASM OF UPPER LOBE OF LEFT LUNG: ICD-10-CM

## 2025-07-03 DIAGNOSIS — C77.1 SECONDARY AND UNSPECIFIED MALIGNANT NEOPLASM OF INTRATHORACIC LYMPH NODES: ICD-10-CM

## 2025-07-03 PROCEDURE — 96361 HYDRATE IV INFUSION ADD-ON: CPT

## 2025-07-03 PROCEDURE — A4216 STERILE WATER/SALINE, 10 ML: HCPCS | Performed by: NURSE PRACTITIONER

## 2025-07-03 PROCEDURE — 63600175 PHARM REV CODE 636 W HCPCS: Performed by: NURSE PRACTITIONER

## 2025-07-03 PROCEDURE — 25000003 PHARM REV CODE 250: Performed by: NURSE PRACTITIONER

## 2025-07-03 PROCEDURE — 96374 THER/PROPH/DIAG INJ IV PUSH: CPT

## 2025-07-03 RX ORDER — HEPARIN 100 UNIT/ML
500 SYRINGE INTRAVENOUS
Status: DISCONTINUED | OUTPATIENT
Start: 2025-07-03 | End: 2025-07-03 | Stop reason: HOSPADM

## 2025-07-03 RX ORDER — SODIUM CHLORIDE 0.9 % (FLUSH) 0.9 %
10 SYRINGE (ML) INJECTION
Status: DISCONTINUED | OUTPATIENT
Start: 2025-07-03 | End: 2025-07-03 | Stop reason: HOSPADM

## 2025-07-03 RX ORDER — FAMOTIDINE 10 MG/ML
20 INJECTION, SOLUTION INTRAVENOUS
Status: COMPLETED | OUTPATIENT
Start: 2025-07-03 | End: 2025-07-03

## 2025-07-03 RX ADMIN — FAMOTIDINE 20 MG: 10 INJECTION INTRAVENOUS at 02:07

## 2025-07-03 RX ADMIN — HEPARIN 500 UNITS: 100 SYRINGE at 04:07

## 2025-07-03 RX ADMIN — SODIUM CHLORIDE, PRESERVATIVE FREE 10 ML: 5 INJECTION INTRAVENOUS at 04:07

## 2025-07-03 RX ADMIN — SODIUM CHLORIDE 1000 ML: 9 INJECTION, SOLUTION INTRAVENOUS at 02:07

## 2025-07-03 NOTE — PLAN OF CARE
Problem: Fatigue  Goal: Improved Activity Tolerance  Outcome: Progressing  Intervention: Promote Improved Energy  Flowsheets (Taken 7/3/2025 1440)  Fatigue Management: frequent rest breaks encouraged

## 2025-07-07 ENCOUNTER — LAB VISIT (OUTPATIENT)
Dept: LAB | Facility: HOSPITAL | Age: 80
End: 2025-07-07
Attending: INTERNAL MEDICINE
Payer: MEDICARE

## 2025-07-07 DIAGNOSIS — D84.821 IMMUNODEFICIENCY DUE TO DRUGS: ICD-10-CM

## 2025-07-07 DIAGNOSIS — Z29.89 IMMUNOTHERAPY: ICD-10-CM

## 2025-07-07 DIAGNOSIS — C34.12 MALIGNANT NEOPLASM OF UPPER LOBE OF LEFT LUNG: ICD-10-CM

## 2025-07-07 DIAGNOSIS — Z79.899 IMMUNODEFICIENCY DUE TO DRUGS: ICD-10-CM

## 2025-07-07 LAB
ABSOLUTE EOSINOPHIL (SMH): 0.1 K/UL
ABSOLUTE MONOCYTE (SMH): 0.07 K/UL (ref 0.3–1)
ABSOLUTE NEUTROPHIL COUNT (SMH): 3.1 K/UL (ref 1.8–7.7)
ALBUMIN SERPL-MCNC: 3.6 G/DL (ref 3.5–5.2)
ALP SERPL-CCNC: 79 UNIT/L (ref 55–135)
ALT SERPL-CCNC: 25 UNIT/L (ref 10–44)
ANION GAP (SMH): 6 MMOL/L (ref 8–16)
AST SERPL-CCNC: 19 UNIT/L (ref 10–40)
BASOPHILS # BLD AUTO: 0.04 K/UL
BASOPHILS NFR BLD AUTO: 0.9 %
BILIRUB SERPL-MCNC: 0.5 MG/DL (ref 0.1–1)
BUN SERPL-MCNC: 17 MG/DL (ref 8–23)
CALCIUM SERPL-MCNC: 9.5 MG/DL (ref 8.7–10.5)
CHLORIDE SERPL-SCNC: 98 MMOL/L (ref 95–110)
CO2 SERPL-SCNC: 30 MMOL/L (ref 23–29)
CREAT SERPL-MCNC: 0.7 MG/DL (ref 0.5–1.4)
ERYTHROCYTE [DISTWIDTH] IN BLOOD BY AUTOMATED COUNT: 13.1 % (ref 11.5–14.5)
GFR SERPLBLD CREATININE-BSD FMLA CKD-EPI: >60 ML/MIN/1.73/M2
GLUCOSE SERPL-MCNC: 117 MG/DL (ref 70–110)
HCT VFR BLD AUTO: 32.7 % (ref 40–54)
HGB BLD-MCNC: 10.9 GM/DL (ref 14–18)
IMM GRANULOCYTES # BLD AUTO: 0.01 K/UL (ref 0–0.04)
IMM GRANULOCYTES NFR BLD AUTO: 0.2 % (ref 0–0.5)
LYMPHOCYTES # BLD AUTO: 1.14 K/UL (ref 1–4.8)
MAGNESIUM SERPL-MCNC: 1.8 MG/DL (ref 1.6–2.6)
MCH RBC QN AUTO: 30.4 PG (ref 27–31)
MCHC RBC AUTO-ENTMCNC: 33.3 G/DL (ref 32–36)
MCV RBC AUTO: 91 FL (ref 82–98)
NUCLEATED RBC (/100WBC) (SMH): 0 /100 WBC
PLATELET # BLD AUTO: 192 K/UL (ref 150–450)
PMV BLD AUTO: 10.3 FL (ref 9.2–12.9)
POTASSIUM SERPL-SCNC: 4.4 MMOL/L (ref 3.5–5.1)
PROT SERPL-MCNC: 6.6 GM/DL (ref 6–8.4)
RBC # BLD AUTO: 3.59 M/UL (ref 4.6–6.2)
RELATIVE EOSINOPHIL (SMH): 2.3 % (ref 0–8)
RELATIVE LYMPHOCYTE (SMH): 25.7 % (ref 18–48)
RELATIVE MONOCYTE (SMH): 1.6 % (ref 4–15)
RELATIVE NEUTROPHIL (SMH): 69.3 % (ref 38–73)
SODIUM SERPL-SCNC: 134 MMOL/L (ref 136–145)
TSH SERPL-ACNC: 2.46 UIU/ML (ref 0.34–5.6)
WBC # BLD AUTO: 4.43 K/UL (ref 3.9–12.7)

## 2025-07-07 PROCEDURE — 36415 COLL VENOUS BLD VENIPUNCTURE: CPT

## 2025-07-07 PROCEDURE — 83735 ASSAY OF MAGNESIUM: CPT

## 2025-07-07 PROCEDURE — 84443 ASSAY THYROID STIM HORMONE: CPT

## 2025-07-07 PROCEDURE — 85025 COMPLETE CBC W/AUTO DIFF WBC: CPT

## 2025-07-07 PROCEDURE — 80053 COMPREHEN METABOLIC PANEL: CPT

## 2025-07-10 ENCOUNTER — OFFICE VISIT (OUTPATIENT)
Facility: CLINIC | Age: 80
End: 2025-07-10
Payer: MEDICARE

## 2025-07-10 VITALS
DIASTOLIC BLOOD PRESSURE: 61 MMHG | TEMPERATURE: 98 F | RESPIRATION RATE: 17 BRPM | HEART RATE: 73 BPM | SYSTOLIC BLOOD PRESSURE: 128 MMHG | BODY MASS INDEX: 27.16 KG/M2 | WEIGHT: 183.88 LBS

## 2025-07-10 DIAGNOSIS — C77.0 SQUAMOUS CELL CARCINOMA METASTATIC TO LYMPH NODES OF HEAD AND NECK: Primary | ICD-10-CM

## 2025-07-10 DIAGNOSIS — C44.92 SQUAMOUS CELL CARCINOMA METASTATIC TO LYMPH NODES OF HEAD AND NECK: Primary | ICD-10-CM

## 2025-07-10 PROCEDURE — 99999 PR PBB SHADOW E&M-EST. PATIENT-LVL III: CPT | Mod: PBBFAC,,, | Performed by: INTERNAL MEDICINE

## 2025-07-10 PROCEDURE — G2211 COMPLEX E/M VISIT ADD ON: HCPCS | Mod: ,,, | Performed by: INTERNAL MEDICINE

## 2025-07-10 PROCEDURE — 99215 OFFICE O/P EST HI 40 MIN: CPT | Mod: S$PBB,,, | Performed by: INTERNAL MEDICINE

## 2025-07-10 PROCEDURE — 99213 OFFICE O/P EST LOW 20 MIN: CPT | Mod: PBBFAC,PN | Performed by: INTERNAL MEDICINE

## 2025-07-10 NOTE — PROGRESS NOTES
PROGRESS NOTE    Subjective:       Patient ID: Taco Odonnell Jr is a 79 y.o. male.    5/13/2025:  LYMPH NODE, RIGHT CERVICAL, CORE BIOPSY   - POSITIVE FOR SQUAMOUS CELL CARCINOMA   P16 negative    5/26/2025-EBUS:  LN 4L: SCCA  LN 11L: SCCA  LN 7:  SCCA      5/16/2025-PET:  --left mediastinal mass within the sub aortic region AP window and extending into the left hilum measuring 5.7 x 4.2 cm   --multiple radiotracer avid lymph nodes in the level 2, level 3, and left level 4 regions of the neck   --The thyroid gland is enlarged and demonstrates diffuse intense uptake.           Carbo/Taxol/Pembrolizumab Treatment:  Cycle 1: 6/11/2025  Cycle 2: 7/2/2025  Cycle 3: 7/23/2025-due    Chief Complaint:  No chief complaint on file.  Squamous call carcinoma of unknown primary     History of Present Illness:   Taco Odonnell Jr is a 79 y.o. male who presents for follow up of unknown primary cancer/lung cancer    Patient started therapy and s/p 2 cycles.  He notes some fatigue but no sob or diarrhea problems.   He states his neck swelling has gone down significantly and his cough has resolved.            Current Medications[1]        Objective:       Physical Examination:     /61   Pulse 73   Temp 97.6 °F (36.4 °C)   Resp 17   Wt 83.4 kg (183 lb 14.4 oz)   BMI 27.16 kg/m²     Physical Exam  Constitutional:       Appearance: Normal appearance.   HENT:      Head: Normocephalic and atraumatic.   Eyes:      General: No scleral icterus.     Conjunctiva/sclera: Conjunctivae normal.   Cardiovascular:      Rate and Rhythm: Normal rate.   Pulmonary:      Effort: Pulmonary effort is normal.   Abdominal:      General: Abdomen is flat.   Neurological:      General: No focal deficit present.      Mental Status: He is alert and oriented to person, place, and time.   Psychiatric:         Mood and Affect: Mood normal.         Behavior: Behavior normal.         Labs:   Recent  Results (from the past 2 weeks)   CBC with Differential    Collection Time: 07/07/25 11:12 AM   Result Value Ref Range    WBC 4.43 3.90 - 12.70 K/uL    Hgb 10.9 (L) 14.0 - 18.0 gm/dL    Hct 32.7 (L) 40.0 - 54.0 %    Platelet Count 192 150 - 450 K/uL   CBC with Differential    Collection Time: 06/30/25 10:18 AM   Result Value Ref Range    WBC 7.47 3.90 - 12.70 K/uL    Hgb 11.9 (L) 14.0 - 18.0 gm/dL    Hct 35.9 (L) 40.0 - 54.0 %    Platelet Count 293 150 - 450 K/uL     CMP  Sodium   Date Value Ref Range Status   07/07/2025 134 (L) 136 - 145 mmol/L Final     Potassium   Date Value Ref Range Status   07/07/2025 4.4 3.5 - 5.1 mmol/L Final     Chloride   Date Value Ref Range Status   07/07/2025 98 95 - 110 mmol/L Final     CO2   Date Value Ref Range Status   07/07/2025 30 (H) 23 - 29 mmol/L Final     Glucose   Date Value Ref Range Status   07/07/2025 117 (H) 70 - 110 mg/dL Final     BUN   Date Value Ref Range Status   07/07/2025 17 8 - 23 mg/dL Final     Creatinine   Date Value Ref Range Status   07/07/2025 0.7 0.5 - 1.4 mg/dL Final     Calcium   Date Value Ref Range Status   07/07/2025 9.5 8.7 - 10.5 mg/dL Final     Protein Total   Date Value Ref Range Status   07/07/2025 6.6 6.0 - 8.4 gm/dL Final     Albumin   Date Value Ref Range Status   07/07/2025 3.6 3.5 - 5.2 g/dL Final     Bilirubin Total   Date Value Ref Range Status   07/07/2025 0.5 0.1 - 1.0 mg/dL Final     Comment:     For infants and newborns, interpretation of results should be based   on gestational age, weight and in agreement with clinical   observations.    Premature Infant recommended reference ranges:   0-24 hours:  <8.0 mg/dL   24-48 hours: <12.0 mg/dL   3-5 days:    <15.0 mg/dL   6-29 days:   <15.0 mg/dL     ALP   Date Value Ref Range Status   07/07/2025 79 55 - 135 unit/L Final     AST   Date Value Ref Range Status   07/07/2025 19 10 - 40 unit/L Final     ALT   Date Value Ref Range Status   07/07/2025 25 10 - 44 unit/L Final     Anion Gap   Date  "Value Ref Range Status   07/07/2025 6 (L) 8 - 16 mmol/L Final     eGFR if    Date Value Ref Range Status   07/05/2022 >60.0 >60 mL/min/1.73 m^2 Final     eGFR if non    Date Value Ref Range Status   07/05/2022 >60.0 >60 mL/min/1.73 m^2 Final     Comment:     Calculation used to obtain the estimated glomerular filtration  rate (eGFR) is the CKD-EPI equation.        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  His labs look good and he is tolerating the therapy with expected toxicity.  He has no sign of autoimmune problems and will continue therapy on schedule as planned.  Continue to monitor labs closely and will arrange for CT neck and chest to be done after cycle 3.  Discussed this today.          Relevant Orders    CT Neck Chest With Contrast (XPD)    Creatinine, serum       Discussion:     Follow up in about 1 month (around 8/11/2025).      Electronically signed by Kyree Melton           [1]   Current Outpatient Medications:     ascorbic acid, vitamin C, (VITAMIN C) 250 MG tablet, Take 250 mg by mouth once daily., Disp: , Rfl:     co-enzyme Q-10 50 mg capsule, Take 200 mg by mouth 2 (two) times daily., Disp: , Rfl:     evolocumab (REPATHA SURECLICK) 140 mg/mL PnIj, 1 mL (140 mg total) by abdominal subcutaneous route every 14 (fourteen) days., Disp: 6 mL, Rfl: 3    ferrous sulfate (FEOSOL) Tab tablet, Take 1 tablet by mouth daily with breakfast., Disp: , Rfl:     FLAXSEED ORAL, Take 1,300 mg by mouth once daily., Disp: , Rfl:     hydroCHLOROthiazide (HYDRODIURIL) 12.5 MG Tab, Take 1 tablet (12.5 mg total) by mouth every morning., Disp: 90 tablet, Rfl: 3    LORazepam (ATIVAN) 0.5 MG " tablet, Take 1 tablet (0.5 mg total) by mouth every 8 (eight) hours as needed for Anxiety., Disp: 30 tablet, Rfl: 2    magnesium oxide (MAG-OX) 400 mg (241.3 mg magnesium) tablet, Take 400 mg by mouth once daily., Disp: , Rfl:     multivit-min/folic acid/lutein (CENTRUM SILVER ORAL), Take 1 tablet by mouth., Disp: , Rfl:     ondansetron (ZOFRAN-ODT) 8 MG TbDL, Take 1 tablet (8 mg total) by mouth every 6 (six) hours as needed (Nausea)., Disp: 30 tablet, Rfl: 6    oxyCODONE-acetaminophen (PERCOCET) 5-325 mg per tablet, take 1 tablet by mouth every 4 hours as needed. Use sparingly, Disp: 60 tablet, Rfl: 0    promethazine (PHENERGAN) 25 MG tablet, Take 1 tablet (25 mg total) by mouth every 6 (six) hours as needed for Nausea., Disp: 30 tablet, Rfl: 6    promethazine (PHENERGAN) 25 MG tablet, Take 1 tablet (25 mg total) by mouth every 6 (six) hours as needed for Nausea., Disp: 15 tablet, Rfl: 0    rivaroxaban (XARELTO) 20 mg Tab, Take 1 tablet (20 mg total) by mouth once daily., Disp: 90 tablet, Rfl: 3    sotaloL (BETAPACE) 80 MG tablet, Take 1 tablet (80 mg total) by mouth 2 (two) times daily., Disp: 180 tablet, Rfl: 3    terazosin (HYTRIN) 5 MG capsule, Take 1 capsule (5 mg total) by mouth every evening., Disp: 90 capsule, Rfl: 3

## 2025-07-10 NOTE — ASSESSMENT & PLAN NOTE
Patient is doing quite well on chemo/immunotherapy.  His labs look good and he is tolerating the therapy with expected toxicity.  He has no sign of autoimmune problems and will continue therapy on schedule as planned.  Continue to monitor labs closely and will arrange for CT neck and chest to be done after cycle 3.  Discussed this today.

## 2025-07-14 ENCOUNTER — LAB VISIT (OUTPATIENT)
Dept: LAB | Facility: HOSPITAL | Age: 80
End: 2025-07-14
Attending: INTERNAL MEDICINE
Payer: MEDICARE

## 2025-07-14 DIAGNOSIS — C34.12 MALIGNANT NEOPLASM OF UPPER LOBE OF LEFT LUNG: ICD-10-CM

## 2025-07-14 DIAGNOSIS — Z29.89 IMMUNOTHERAPY: ICD-10-CM

## 2025-07-14 LAB
ABSOLUTE EOSINOPHIL (SMH): 0.06 K/UL
ABSOLUTE MONOCYTE (SMH): 0.56 K/UL (ref 0.3–1)
ABSOLUTE NEUTROPHIL COUNT (SMH): 0.6 K/UL (ref 1.8–7.7)
ALBUMIN SERPL-MCNC: 3.7 G/DL (ref 3.5–5.2)
ALP SERPL-CCNC: 77 UNIT/L (ref 55–135)
ALT SERPL-CCNC: 18 UNIT/L (ref 10–44)
ANION GAP (SMH): 7 MMOL/L (ref 8–16)
AST SERPL-CCNC: 17 UNIT/L (ref 10–40)
BASOPHILS # BLD AUTO: 0.03 K/UL
BASOPHILS NFR BLD AUTO: 1.2 %
BILIRUB SERPL-MCNC: 0.3 MG/DL (ref 0.1–1)
BUN SERPL-MCNC: 15 MG/DL (ref 8–23)
CALCIUM SERPL-MCNC: 9.3 MG/DL (ref 8.7–10.5)
CHLORIDE SERPL-SCNC: 103 MMOL/L (ref 95–110)
CO2 SERPL-SCNC: 29 MMOL/L (ref 23–29)
CREAT SERPL-MCNC: 0.6 MG/DL (ref 0.5–1.4)
ERYTHROCYTE [DISTWIDTH] IN BLOOD BY AUTOMATED COUNT: 14.1 % (ref 11.5–14.5)
GFR SERPLBLD CREATININE-BSD FMLA CKD-EPI: >60 ML/MIN/1.73/M2
GLUCOSE SERPL-MCNC: 108 MG/DL (ref 70–110)
HCT VFR BLD AUTO: 31 % (ref 40–54)
HGB BLD-MCNC: 10.3 GM/DL (ref 14–18)
IMM GRANULOCYTES # BLD AUTO: 0 K/UL (ref 0–0.04)
IMM GRANULOCYTES NFR BLD AUTO: 0 % (ref 0–0.5)
LYMPHOCYTES # BLD AUTO: 1.2 K/UL (ref 1–4.8)
MAGNESIUM SERPL-MCNC: 1.9 MG/DL (ref 1.6–2.6)
MCH RBC QN AUTO: 30.6 PG (ref 27–31)
MCHC RBC AUTO-ENTMCNC: 33.2 G/DL (ref 32–36)
MCV RBC AUTO: 92 FL (ref 82–98)
NUCLEATED RBC (/100WBC) (SMH): 0 /100 WBC
PLATELET # BLD AUTO: 218 K/UL (ref 150–450)
PMV BLD AUTO: 9.5 FL (ref 9.2–12.9)
POTASSIUM SERPL-SCNC: 4.1 MMOL/L (ref 3.5–5.1)
PROT SERPL-MCNC: 6.8 GM/DL (ref 6–8.4)
RBC # BLD AUTO: 3.37 M/UL (ref 4.6–6.2)
RELATIVE EOSINOPHIL (SMH): 2.4 % (ref 0–8)
RELATIVE LYMPHOCYTE (SMH): 49 % (ref 18–48)
RELATIVE MONOCYTE (SMH): 22.9 % (ref 4–15)
RELATIVE NEUTROPHIL (SMH): 24.5 % (ref 38–73)
SODIUM SERPL-SCNC: 139 MMOL/L (ref 136–145)
WBC # BLD AUTO: 2.45 K/UL (ref 3.9–12.7)

## 2025-07-14 PROCEDURE — 36415 COLL VENOUS BLD VENIPUNCTURE: CPT

## 2025-07-14 PROCEDURE — 85025 COMPLETE CBC W/AUTO DIFF WBC: CPT

## 2025-07-14 PROCEDURE — 82040 ASSAY OF SERUM ALBUMIN: CPT

## 2025-07-14 PROCEDURE — 83735 ASSAY OF MAGNESIUM: CPT

## 2025-07-15 ENCOUNTER — TELEPHONE (OUTPATIENT)
Facility: CLINIC | Age: 80
End: 2025-07-15
Payer: MEDICARE

## 2025-07-15 ENCOUNTER — INFUSION (OUTPATIENT)
Dept: INFUSION THERAPY | Facility: HOSPITAL | Age: 80
End: 2025-07-15
Attending: NURSE PRACTITIONER
Payer: MEDICARE

## 2025-07-15 VITALS
TEMPERATURE: 97 F | RESPIRATION RATE: 18 BRPM | HEIGHT: 70 IN | OXYGEN SATURATION: 97 % | DIASTOLIC BLOOD PRESSURE: 70 MMHG | SYSTOLIC BLOOD PRESSURE: 121 MMHG | WEIGHT: 189.5 LBS | BODY MASS INDEX: 27.13 KG/M2 | HEART RATE: 70 BPM

## 2025-07-15 DIAGNOSIS — C77.1 SECONDARY AND UNSPECIFIED MALIGNANT NEOPLASM OF INTRATHORACIC LYMPH NODES: ICD-10-CM

## 2025-07-15 DIAGNOSIS — C34.12 MALIGNANT NEOPLASM OF UPPER LOBE OF LEFT LUNG: Primary | ICD-10-CM

## 2025-07-15 PROCEDURE — 63600175 PHARM REV CODE 636 W HCPCS: Mod: JZ,TB | Performed by: NURSE PRACTITIONER

## 2025-07-15 PROCEDURE — 96372 THER/PROPH/DIAG INJ SC/IM: CPT

## 2025-07-15 RX ADMIN — FILGRASTIM 480 MCG: 480 INJECTION, SOLUTION INTRAVENOUS; SUBCUTANEOUS at 04:07

## 2025-07-15 NOTE — TELEPHONE ENCOUNTER
Called patient on regard to above indications, request sent to Infusion, patient stated understanding.

## 2025-07-15 NOTE — TELEPHONE ENCOUNTER
----- Message from TEE Reynolds sent at 7/14/2025  4:23 PM CDT -----  Please notify the patient that their ANC low at 600. Given Neupogen daily x 3 days 480mcg. And repeat labs next week. Can add Udenyca to Day 2  ----- Message -----  From: Lab, Background User  Sent: 7/14/2025  11:34 AM CDT  To: LIANG ReynoldsC

## 2025-07-15 NOTE — PLAN OF CARE
Problem: Neutropenia  Goal: Absence of Infection  Outcome: Progressing  Intervention: Prevent Infection and Maximize Resistance  Flowsheets (Taken 7/15/2025 8725)  Infection Prevention:   hand hygiene promoted   environmental surveillance performed

## 2025-07-16 ENCOUNTER — INFUSION (OUTPATIENT)
Dept: INFUSION THERAPY | Facility: HOSPITAL | Age: 80
End: 2025-07-16
Attending: NURSE PRACTITIONER
Payer: MEDICARE

## 2025-07-16 VITALS
BODY MASS INDEX: 26.79 KG/M2 | HEIGHT: 70 IN | RESPIRATION RATE: 17 BRPM | HEART RATE: 66 BPM | TEMPERATURE: 98 F | DIASTOLIC BLOOD PRESSURE: 68 MMHG | OXYGEN SATURATION: 97 % | WEIGHT: 187.13 LBS | SYSTOLIC BLOOD PRESSURE: 128 MMHG

## 2025-07-16 DIAGNOSIS — C77.1 SECONDARY AND UNSPECIFIED MALIGNANT NEOPLASM OF INTRATHORACIC LYMPH NODES: ICD-10-CM

## 2025-07-16 DIAGNOSIS — C34.12 MALIGNANT NEOPLASM OF UPPER LOBE OF LEFT LUNG: Primary | ICD-10-CM

## 2025-07-16 PROCEDURE — 63600175 PHARM REV CODE 636 W HCPCS: Mod: JZ,TB | Performed by: NURSE PRACTITIONER

## 2025-07-16 PROCEDURE — 96372 THER/PROPH/DIAG INJ SC/IM: CPT

## 2025-07-16 RX ADMIN — FILGRASTIM 480 MCG: 480 INJECTION, SOLUTION INTRAVENOUS; SUBCUTANEOUS at 10:07

## 2025-07-16 NOTE — PLAN OF CARE
Problem: Fatigue  Goal: Improved Activity Tolerance  Intervention: Promote Improved Energy  Flowsheets (Taken 7/16/2025 1015)  Fatigue Management: fatigue-related activity identified  Activity Management: Ambulated -L4

## 2025-07-17 ENCOUNTER — INFUSION (OUTPATIENT)
Dept: INFUSION THERAPY | Facility: HOSPITAL | Age: 80
End: 2025-07-17
Attending: NURSE PRACTITIONER
Payer: MEDICARE

## 2025-07-17 VITALS
BODY MASS INDEX: 26.92 KG/M2 | RESPIRATION RATE: 16 BRPM | DIASTOLIC BLOOD PRESSURE: 69 MMHG | WEIGHT: 188 LBS | HEIGHT: 70 IN | SYSTOLIC BLOOD PRESSURE: 131 MMHG | HEART RATE: 64 BPM | TEMPERATURE: 98 F | OXYGEN SATURATION: 98 %

## 2025-07-17 DIAGNOSIS — C77.1 SECONDARY AND UNSPECIFIED MALIGNANT NEOPLASM OF INTRATHORACIC LYMPH NODES: ICD-10-CM

## 2025-07-17 DIAGNOSIS — C34.12 MALIGNANT NEOPLASM OF UPPER LOBE OF LEFT LUNG: Primary | ICD-10-CM

## 2025-07-17 PROCEDURE — 63600175 PHARM REV CODE 636 W HCPCS: Mod: JZ,TB | Performed by: NURSE PRACTITIONER

## 2025-07-17 PROCEDURE — 96372 THER/PROPH/DIAG INJ SC/IM: CPT

## 2025-07-17 RX ADMIN — FILGRASTIM 480 MCG: 480 INJECTION, SOLUTION INTRAVENOUS; SUBCUTANEOUS at 03:07

## 2025-07-17 NOTE — PLAN OF CARE
Problem: Fatigue  Goal: Improved Activity Tolerance  Outcome: Progressing  Intervention: Promote Improved Energy  Flowsheets (Taken 7/17/2025 1258)  Fatigue Management: paced activity encouraged  Sleep/Rest Enhancement: regular sleep/rest pattern promoted  Activity Management: Ambulated -L4  Environmental Support: environmental consistency promoted

## 2025-07-21 ENCOUNTER — LAB VISIT (OUTPATIENT)
Dept: LAB | Facility: HOSPITAL | Age: 80
End: 2025-07-21
Attending: INTERNAL MEDICINE
Payer: MEDICARE

## 2025-07-21 DIAGNOSIS — C34.12 MALIGNANT NEOPLASM OF UPPER LOBE OF LEFT LUNG: ICD-10-CM

## 2025-07-21 DIAGNOSIS — Z29.89 IMMUNOTHERAPY: ICD-10-CM

## 2025-07-21 LAB
ABSOLUTE EOSINOPHIL (SMH): 0.03 K/UL
ABSOLUTE MONOCYTE (SMH): 0.9 K/UL (ref 0.3–1)
ABSOLUTE NEUTROPHIL COUNT (SMH): 3.3 K/UL (ref 1.8–7.7)
ALBUMIN SERPL-MCNC: 4 G/DL (ref 3.5–5.2)
ALP SERPL-CCNC: 111 UNIT/L (ref 55–135)
ALT SERPL-CCNC: 14 UNIT/L (ref 10–44)
ANION GAP (SMH): 8 MMOL/L (ref 8–16)
AST SERPL-CCNC: 15 UNIT/L (ref 10–40)
BASOPHILS # BLD AUTO: 0.03 K/UL
BASOPHILS NFR BLD AUTO: 0.5 %
BILIRUB SERPL-MCNC: 0.5 MG/DL (ref 0.1–1)
BUN SERPL-MCNC: 14 MG/DL (ref 8–23)
CALCIUM SERPL-MCNC: 9.3 MG/DL (ref 8.7–10.5)
CHLORIDE SERPL-SCNC: 102 MMOL/L (ref 95–110)
CO2 SERPL-SCNC: 28 MMOL/L (ref 23–29)
CREAT SERPL-MCNC: 0.6 MG/DL (ref 0.5–1.4)
ERYTHROCYTE [DISTWIDTH] IN BLOOD BY AUTOMATED COUNT: 15.6 % (ref 11.5–14.5)
GFR SERPLBLD CREATININE-BSD FMLA CKD-EPI: >60 ML/MIN/1.73/M2
GLUCOSE SERPL-MCNC: 119 MG/DL (ref 70–110)
HCT VFR BLD AUTO: 35.1 % (ref 40–54)
HGB BLD-MCNC: 12 GM/DL (ref 14–18)
IMM GRANULOCYTES # BLD AUTO: 0.02 K/UL (ref 0–0.04)
IMM GRANULOCYTES NFR BLD AUTO: 0.4 % (ref 0–0.5)
LYMPHOCYTES # BLD AUTO: 1.4 K/UL (ref 1–4.8)
MAGNESIUM SERPL-MCNC: 2 MG/DL (ref 1.6–2.6)
MCH RBC QN AUTO: 31.4 PG (ref 27–31)
MCHC RBC AUTO-ENTMCNC: 34.2 G/DL (ref 32–36)
MCV RBC AUTO: 92 FL (ref 82–98)
NUCLEATED RBC (/100WBC) (SMH): 0 /100 WBC
PLATELET # BLD AUTO: 149 K/UL (ref 150–450)
PMV BLD AUTO: 9.3 FL (ref 9.2–12.9)
POTASSIUM SERPL-SCNC: 3.9 MMOL/L (ref 3.5–5.1)
PROT SERPL-MCNC: 7 GM/DL (ref 6–8.4)
RBC # BLD AUTO: 3.82 M/UL (ref 4.6–6.2)
RELATIVE EOSINOPHIL (SMH): 0.5 % (ref 0–8)
RELATIVE LYMPHOCYTE (SMH): 24.7 % (ref 18–48)
RELATIVE MONOCYTE (SMH): 15.9 % (ref 4–15)
RELATIVE NEUTROPHIL (SMH): 58 % (ref 38–73)
SODIUM SERPL-SCNC: 138 MMOL/L (ref 136–145)
WBC # BLD AUTO: 5.67 K/UL (ref 3.9–12.7)

## 2025-07-21 PROCEDURE — 36415 COLL VENOUS BLD VENIPUNCTURE: CPT

## 2025-07-21 PROCEDURE — 85025 COMPLETE CBC W/AUTO DIFF WBC: CPT

## 2025-07-21 PROCEDURE — 80053 COMPREHEN METABOLIC PANEL: CPT

## 2025-07-21 PROCEDURE — 83735 ASSAY OF MAGNESIUM: CPT

## 2025-07-23 ENCOUNTER — INFUSION (OUTPATIENT)
Dept: INFUSION THERAPY | Facility: HOSPITAL | Age: 80
End: 2025-07-23
Attending: INTERNAL MEDICINE
Payer: MEDICARE

## 2025-07-23 VITALS
WEIGHT: 184.5 LBS | HEART RATE: 56 BPM | OXYGEN SATURATION: 97 % | BODY MASS INDEX: 26.41 KG/M2 | TEMPERATURE: 97 F | RESPIRATION RATE: 16 BRPM | SYSTOLIC BLOOD PRESSURE: 141 MMHG | DIASTOLIC BLOOD PRESSURE: 91 MMHG | HEIGHT: 70 IN

## 2025-07-23 DIAGNOSIS — C34.12 MALIGNANT NEOPLASM OF UPPER LOBE OF LEFT LUNG: Primary | ICD-10-CM

## 2025-07-23 PROCEDURE — 96367 TX/PROPH/DG ADDL SEQ IV INF: CPT

## 2025-07-23 PROCEDURE — 96413 CHEMO IV INFUSION 1 HR: CPT

## 2025-07-23 PROCEDURE — A4216 STERILE WATER/SALINE, 10 ML: HCPCS | Performed by: INTERNAL MEDICINE

## 2025-07-23 PROCEDURE — 25000003 PHARM REV CODE 250: Performed by: INTERNAL MEDICINE

## 2025-07-23 PROCEDURE — 63600175 PHARM REV CODE 636 W HCPCS: Performed by: INTERNAL MEDICINE

## 2025-07-23 PROCEDURE — 96415 CHEMO IV INFUSION ADDL HR: CPT

## 2025-07-23 PROCEDURE — 96417 CHEMO IV INFUS EACH ADDL SEQ: CPT

## 2025-07-23 PROCEDURE — 96375 TX/PRO/DX INJ NEW DRUG ADDON: CPT

## 2025-07-23 RX ORDER — DIPHENHYDRAMINE HYDROCHLORIDE 50 MG/ML
50 INJECTION, SOLUTION INTRAMUSCULAR; INTRAVENOUS ONCE AS NEEDED
Status: DISCONTINUED | OUTPATIENT
Start: 2025-07-23 | End: 2025-07-23 | Stop reason: HOSPADM

## 2025-07-23 RX ORDER — HEPARIN 100 UNIT/ML
500 SYRINGE INTRAVENOUS
Status: DISCONTINUED | OUTPATIENT
Start: 2025-07-23 | End: 2025-07-23 | Stop reason: HOSPADM

## 2025-07-23 RX ORDER — DIPHENHYDRAMINE HYDROCHLORIDE 50 MG/ML
50 INJECTION, SOLUTION INTRAMUSCULAR; INTRAVENOUS ONCE AS NEEDED
Status: CANCELLED | OUTPATIENT
Start: 2025-07-23

## 2025-07-23 RX ORDER — HEPARIN 100 UNIT/ML
500 SYRINGE INTRAVENOUS
Status: CANCELLED | OUTPATIENT
Start: 2025-07-23

## 2025-07-23 RX ORDER — EPINEPHRINE 0.3 MG/.3ML
0.3 INJECTION SUBCUTANEOUS ONCE AS NEEDED
Status: DISCONTINUED | OUTPATIENT
Start: 2025-07-23 | End: 2025-07-23 | Stop reason: HOSPADM

## 2025-07-23 RX ORDER — SODIUM CHLORIDE 0.9 % (FLUSH) 0.9 %
10 SYRINGE (ML) INJECTION
Status: CANCELLED | OUTPATIENT
Start: 2025-07-23

## 2025-07-23 RX ORDER — FAMOTIDINE 10 MG/ML
20 INJECTION, SOLUTION INTRAVENOUS
Status: COMPLETED | OUTPATIENT
Start: 2025-07-23 | End: 2025-07-23

## 2025-07-23 RX ORDER — SODIUM CHLORIDE 0.9 % (FLUSH) 0.9 %
10 SYRINGE (ML) INJECTION
Status: DISCONTINUED | OUTPATIENT
Start: 2025-07-23 | End: 2025-07-23 | Stop reason: HOSPADM

## 2025-07-23 RX ORDER — FAMOTIDINE 10 MG/ML
20 INJECTION, SOLUTION INTRAVENOUS
Status: CANCELLED | OUTPATIENT
Start: 2025-07-23

## 2025-07-23 RX ORDER — EPINEPHRINE 0.3 MG/.3ML
0.3 INJECTION SUBCUTANEOUS ONCE AS NEEDED
Status: CANCELLED | OUTPATIENT
Start: 2025-07-23

## 2025-07-23 RX ADMIN — SODIUM CHLORIDE 200 MG: 9 INJECTION, SOLUTION INTRAVENOUS at 07:07

## 2025-07-23 RX ADMIN — FAMOTIDINE 20 MG: 10 INJECTION INTRAVENOUS at 08:07

## 2025-07-23 RX ADMIN — SODIUM CHLORIDE: 9 INJECTION, SOLUTION INTRAVENOUS at 07:07

## 2025-07-23 RX ADMIN — PACLITAXEL 408 MG: 6 INJECTION, SOLUTION INTRAVENOUS at 09:07

## 2025-07-23 RX ADMIN — CARBOPLATIN 590 MG: 450 INJECTION, SOLUTION INTRAVENOUS at 12:07

## 2025-07-23 RX ADMIN — PALONOSETRON HYDROCHLORIDE 0.25 MG: 0.25 INJECTION, SOLUTION INTRAVENOUS at 08:07

## 2025-07-23 RX ADMIN — HEPARIN 500 UNITS: 100 SYRINGE at 01:07

## 2025-07-23 RX ADMIN — APREPITANT 130 MG: 130 INJECTION, EMULSION INTRAVENOUS at 08:07

## 2025-07-23 RX ADMIN — DIPHENHYDRAMINE HYDROCHLORIDE 50 MG: 50 INJECTION INTRAMUSCULAR; INTRAVENOUS at 08:07

## 2025-07-23 RX ADMIN — SODIUM CHLORIDE, PRESERVATIVE FREE 10 ML: 5 INJECTION INTRAVENOUS at 01:07

## 2025-07-23 NOTE — PLAN OF CARE
Problem: Fatigue  Goal: Improved Activity Tolerance  Outcome: Progressing  Intervention: Promote Improved Energy  Flowsheets (Taken 7/23/2025 8123)  Fatigue Management: frequent rest breaks encouraged  Sleep/Rest Enhancement: reading promoted  Environmental Support: rest periods encouraged

## 2025-07-24 RX ORDER — SODIUM CHLORIDE 0.9 % (FLUSH) 0.9 %
10 SYRINGE (ML) INJECTION
Status: CANCELLED | OUTPATIENT
Start: 2025-07-25

## 2025-07-24 RX ORDER — HEPARIN 100 UNIT/ML
500 SYRINGE INTRAVENOUS
Status: CANCELLED | OUTPATIENT
Start: 2025-07-25

## 2025-07-24 RX ORDER — FAMOTIDINE 10 MG/ML
20 INJECTION, SOLUTION INTRAVENOUS
Status: CANCELLED | OUTPATIENT
Start: 2025-07-25 | End: 2025-07-25

## 2025-07-25 ENCOUNTER — INFUSION (OUTPATIENT)
Dept: INFUSION THERAPY | Facility: HOSPITAL | Age: 80
End: 2025-07-25
Attending: INTERNAL MEDICINE
Payer: MEDICARE

## 2025-07-25 VITALS
DIASTOLIC BLOOD PRESSURE: 52 MMHG | HEART RATE: 55 BPM | HEIGHT: 70 IN | OXYGEN SATURATION: 99 % | TEMPERATURE: 97 F | WEIGHT: 189.88 LBS | BODY MASS INDEX: 27.18 KG/M2 | SYSTOLIC BLOOD PRESSURE: 148 MMHG | RESPIRATION RATE: 18 BRPM

## 2025-07-25 DIAGNOSIS — E86.0 DEHYDRATION: Primary | ICD-10-CM

## 2025-07-25 DIAGNOSIS — C34.12 MALIGNANT NEOPLASM OF UPPER LOBE OF LEFT LUNG: ICD-10-CM

## 2025-07-25 DIAGNOSIS — C77.1 SECONDARY AND UNSPECIFIED MALIGNANT NEOPLASM OF INTRATHORACIC LYMPH NODES: ICD-10-CM

## 2025-07-25 PROCEDURE — 96361 HYDRATE IV INFUSION ADD-ON: CPT

## 2025-07-25 PROCEDURE — 25000003 PHARM REV CODE 250: Performed by: INTERNAL MEDICINE

## 2025-07-25 PROCEDURE — A4216 STERILE WATER/SALINE, 10 ML: HCPCS | Performed by: INTERNAL MEDICINE

## 2025-07-25 PROCEDURE — 96374 THER/PROPH/DIAG INJ IV PUSH: CPT

## 2025-07-25 PROCEDURE — 63600175 PHARM REV CODE 636 W HCPCS: Performed by: INTERNAL MEDICINE

## 2025-07-25 RX ORDER — SODIUM CHLORIDE 0.9 % (FLUSH) 0.9 %
10 SYRINGE (ML) INJECTION
Status: DISCONTINUED | OUTPATIENT
Start: 2025-07-25 | End: 2025-07-25 | Stop reason: HOSPADM

## 2025-07-25 RX ORDER — HEPARIN 100 UNIT/ML
500 SYRINGE INTRAVENOUS
Status: DISCONTINUED | OUTPATIENT
Start: 2025-07-25 | End: 2025-07-25 | Stop reason: HOSPADM

## 2025-07-25 RX ORDER — FAMOTIDINE 10 MG/ML
20 INJECTION, SOLUTION INTRAVENOUS
Status: COMPLETED | OUTPATIENT
Start: 2025-07-25 | End: 2025-07-25

## 2025-07-25 RX ADMIN — HEPARIN 500 UNITS: 100 SYRINGE at 03:07

## 2025-07-25 RX ADMIN — SODIUM CHLORIDE, PRESERVATIVE FREE 10 ML: 5 INJECTION INTRAVENOUS at 02:07

## 2025-07-25 RX ADMIN — FAMOTIDINE 20 MG: 10 INJECTION INTRAVENOUS at 02:07

## 2025-07-25 RX ADMIN — SODIUM CHLORIDE, PRESERVATIVE FREE 10 ML: 5 INJECTION INTRAVENOUS at 03:07

## 2025-07-25 RX ADMIN — SODIUM CHLORIDE 1000 ML: 9 INJECTION, SOLUTION INTRAVENOUS at 02:07

## 2025-07-25 NOTE — PLAN OF CARE
Problem: Fatigue  Goal: Improved Activity Tolerance  Outcome: Progressing  Intervention: Promote Improved Energy  Flowsheets (Taken 7/25/2025 2110)  Fatigue Management: fatigue-related activity identified  Sleep/Rest Enhancement: noise level reduced  Activity Management: Up in chair - L3  Environmental Support: environmental consistency promoted

## 2025-07-28 ENCOUNTER — LAB VISIT (OUTPATIENT)
Dept: LAB | Facility: HOSPITAL | Age: 80
End: 2025-07-28
Attending: INTERNAL MEDICINE
Payer: MEDICARE

## 2025-07-28 DIAGNOSIS — C34.12 MALIGNANT NEOPLASM OF UPPER LOBE OF LEFT LUNG: ICD-10-CM

## 2025-07-28 DIAGNOSIS — Z29.89 IMMUNOTHERAPY: ICD-10-CM

## 2025-07-28 LAB
ABSOLUTE EOSINOPHIL (SMH): 0.05 K/UL
ABSOLUTE MONOCYTE (SMH): 0.06 K/UL (ref 0.3–1)
ABSOLUTE NEUTROPHIL COUNT (SMH): 1.2 K/UL (ref 1.8–7.7)
ALBUMIN SERPL-MCNC: 3.9 G/DL (ref 3.5–5.2)
ALP SERPL-CCNC: 70 UNIT/L (ref 55–135)
ALT SERPL-CCNC: 18 UNIT/L (ref 10–44)
ANION GAP (SMH): 9 MMOL/L (ref 8–16)
AST SERPL-CCNC: 19 UNIT/L (ref 10–40)
BASOPHILS # BLD AUTO: 0.03 K/UL
BASOPHILS NFR BLD AUTO: 1.3 %
BILIRUB SERPL-MCNC: 0.6 MG/DL (ref 0.1–1)
BUN SERPL-MCNC: 18 MG/DL (ref 8–23)
CALCIUM SERPL-MCNC: 9.4 MG/DL (ref 8.7–10.5)
CHLORIDE SERPL-SCNC: 102 MMOL/L (ref 95–110)
CO2 SERPL-SCNC: 27 MMOL/L (ref 23–29)
CREAT SERPL-MCNC: 0.6 MG/DL (ref 0.5–1.4)
ERYTHROCYTE [DISTWIDTH] IN BLOOD BY AUTOMATED COUNT: 16.1 % (ref 11.5–14.5)
GFR SERPLBLD CREATININE-BSD FMLA CKD-EPI: >60 ML/MIN/1.73/M2
GLUCOSE SERPL-MCNC: 110 MG/DL (ref 70–110)
HCT VFR BLD AUTO: 33.1 % (ref 40–54)
HGB BLD-MCNC: 11.1 GM/DL (ref 14–18)
IMM GRANULOCYTES # BLD AUTO: 0.01 K/UL (ref 0–0.04)
IMM GRANULOCYTES NFR BLD AUTO: 0.4 % (ref 0–0.5)
LYMPHOCYTES # BLD AUTO: 0.95 K/UL (ref 1–4.8)
MAGNESIUM SERPL-MCNC: 1.8 MG/DL (ref 1.6–2.6)
MCH RBC QN AUTO: 30.9 PG (ref 27–31)
MCHC RBC AUTO-ENTMCNC: 33.5 G/DL (ref 32–36)
MCV RBC AUTO: 92 FL (ref 82–98)
NUCLEATED RBC (/100WBC) (SMH): 0 /100 WBC
PLATELET # BLD AUTO: 131 K/UL (ref 150–450)
PMV BLD AUTO: 11.5 FL (ref 9.2–12.9)
POTASSIUM SERPL-SCNC: 4.5 MMOL/L (ref 3.5–5.1)
PROT SERPL-MCNC: 6.7 GM/DL (ref 6–8.4)
RBC # BLD AUTO: 3.59 M/UL (ref 4.6–6.2)
RELATIVE EOSINOPHIL (SMH): 2.1 % (ref 0–8)
RELATIVE LYMPHOCYTE (SMH): 40.8 % (ref 18–48)
RELATIVE MONOCYTE (SMH): 2.6 % (ref 4–15)
RELATIVE NEUTROPHIL (SMH): 52.8 % (ref 38–73)
SODIUM SERPL-SCNC: 138 MMOL/L (ref 136–145)
WBC # BLD AUTO: 2.33 K/UL (ref 3.9–12.7)

## 2025-07-28 PROCEDURE — 83735 ASSAY OF MAGNESIUM: CPT

## 2025-07-28 PROCEDURE — 85025 COMPLETE CBC W/AUTO DIFF WBC: CPT

## 2025-07-28 PROCEDURE — 80053 COMPREHEN METABOLIC PANEL: CPT

## 2025-07-28 PROCEDURE — 36415 COLL VENOUS BLD VENIPUNCTURE: CPT

## 2025-08-04 ENCOUNTER — LAB VISIT (OUTPATIENT)
Dept: LAB | Facility: HOSPITAL | Age: 80
End: 2025-08-04
Attending: INTERNAL MEDICINE
Payer: MEDICARE

## 2025-08-04 DIAGNOSIS — D84.821 IMMUNODEFICIENCY DUE TO DRUGS: ICD-10-CM

## 2025-08-04 DIAGNOSIS — Z79.899 IMMUNODEFICIENCY DUE TO DRUGS: ICD-10-CM

## 2025-08-04 DIAGNOSIS — Z29.89 IMMUNOTHERAPY: ICD-10-CM

## 2025-08-04 DIAGNOSIS — C34.12 MALIGNANT NEOPLASM OF UPPER LOBE OF LEFT LUNG: ICD-10-CM

## 2025-08-04 LAB
ABSOLUTE EOSINOPHIL (SMH): 0.04 K/UL
ABSOLUTE MONOCYTE (SMH): 0.61 K/UL (ref 0.3–1)
ABSOLUTE NEUTROPHIL COUNT (SMH): 0.5 K/UL (ref 1.8–7.7)
ALBUMIN SERPL-MCNC: 4 G/DL (ref 3.5–5.2)
ALP SERPL-CCNC: 72 UNIT/L (ref 55–135)
ALT SERPL-CCNC: 18 UNIT/L (ref 10–44)
ANION GAP (SMH): 5 MMOL/L (ref 8–16)
AST SERPL-CCNC: 18 UNIT/L (ref 10–40)
BASOPHILS # BLD AUTO: 0.02 K/UL
BASOPHILS NFR BLD AUTO: 0.8 %
BILIRUB SERPL-MCNC: 0.4 MG/DL (ref 0.1–1)
BUN SERPL-MCNC: 15 MG/DL (ref 8–23)
CALCIUM SERPL-MCNC: 9.2 MG/DL (ref 8.7–10.5)
CHLORIDE SERPL-SCNC: 106 MMOL/L (ref 95–110)
CO2 SERPL-SCNC: 30 MMOL/L (ref 23–29)
CREAT SERPL-MCNC: 0.7 MG/DL (ref 0.5–1.4)
ERYTHROCYTE [DISTWIDTH] IN BLOOD BY AUTOMATED COUNT: 17.5 % (ref 11.5–14.5)
GFR SERPLBLD CREATININE-BSD FMLA CKD-EPI: >60 ML/MIN/1.73/M2
GLUCOSE SERPL-MCNC: 97 MG/DL (ref 70–110)
HCT VFR BLD AUTO: 32.3 % (ref 40–54)
HGB BLD-MCNC: 10.9 GM/DL (ref 14–18)
IMM GRANULOCYTES # BLD AUTO: 0 K/UL (ref 0–0.04)
IMM GRANULOCYTES NFR BLD AUTO: 0 % (ref 0–0.5)
LYMPHOCYTES # BLD AUTO: 1.38 K/UL (ref 1–4.8)
MAGNESIUM SERPL-MCNC: 2.1 MG/DL (ref 1.6–2.6)
MCH RBC QN AUTO: 31.9 PG (ref 27–31)
MCHC RBC AUTO-ENTMCNC: 33.7 G/DL (ref 32–36)
MCV RBC AUTO: 94 FL (ref 82–98)
NUCLEATED RBC (/100WBC) (SMH): 0 /100 WBC
PLATELET # BLD AUTO: 193 K/UL (ref 150–450)
PLATELET BLD QL SMEAR: NORMAL
PMV BLD AUTO: 9.5 FL (ref 9.2–12.9)
POTASSIUM SERPL-SCNC: 4.5 MMOL/L (ref 3.5–5.1)
PROT SERPL-MCNC: 6.8 GM/DL (ref 6–8.4)
RBC # BLD AUTO: 3.42 M/UL (ref 4.6–6.2)
RELATIVE EOSINOPHIL (SMH): 1.6 % (ref 0–8)
RELATIVE LYMPHOCYTE (SMH): 55 % (ref 18–48)
RELATIVE MONOCYTE (SMH): 24.3 % (ref 4–15)
RELATIVE NEUTROPHIL (SMH): 18.3 % (ref 38–73)
SODIUM SERPL-SCNC: 141 MMOL/L (ref 136–145)
TSH SERPL-ACNC: 2.11 UIU/ML (ref 0.34–5.6)
WBC # BLD AUTO: 2.51 K/UL (ref 3.9–12.7)

## 2025-08-04 PROCEDURE — 85025 COMPLETE CBC W/AUTO DIFF WBC: CPT

## 2025-08-04 PROCEDURE — 84443 ASSAY THYROID STIM HORMONE: CPT

## 2025-08-04 PROCEDURE — 83735 ASSAY OF MAGNESIUM: CPT

## 2025-08-04 PROCEDURE — 82247 BILIRUBIN TOTAL: CPT

## 2025-08-04 PROCEDURE — 36415 COLL VENOUS BLD VENIPUNCTURE: CPT

## 2025-08-05 ENCOUNTER — INFUSION (OUTPATIENT)
Dept: INFUSION THERAPY | Facility: HOSPITAL | Age: 80
End: 2025-08-05
Attending: INTERNAL MEDICINE
Payer: MEDICARE

## 2025-08-05 ENCOUNTER — TELEPHONE (OUTPATIENT)
Facility: CLINIC | Age: 80
End: 2025-08-05
Payer: MEDICARE

## 2025-08-05 VITALS
HEIGHT: 70 IN | WEIGHT: 188.38 LBS | TEMPERATURE: 98 F | DIASTOLIC BLOOD PRESSURE: 74 MMHG | HEART RATE: 67 BPM | OXYGEN SATURATION: 97 % | SYSTOLIC BLOOD PRESSURE: 129 MMHG | BODY MASS INDEX: 26.97 KG/M2 | RESPIRATION RATE: 17 BRPM

## 2025-08-05 DIAGNOSIS — C34.12 MALIGNANT NEOPLASM OF UPPER LOBE OF LEFT LUNG: Primary | ICD-10-CM

## 2025-08-05 PROCEDURE — 63600175 PHARM REV CODE 636 W HCPCS: Mod: JZ,JB,TB | Performed by: NURSE PRACTITIONER

## 2025-08-05 PROCEDURE — 96372 THER/PROPH/DIAG INJ SC/IM: CPT

## 2025-08-05 RX ADMIN — FILGRASTIM-SNDZ 480 MCG: 480 INJECTION, SOLUTION INTRAVENOUS; SUBCUTANEOUS at 10:08

## 2025-08-05 NOTE — TELEPHONE ENCOUNTER
Called patient on regard to above information, a request has been sent to infusion scheduling to schedule appointment, patient stated understanding.

## 2025-08-05 NOTE — PLAN OF CARE
Problem: Fatigue  Goal: Improved Activity Tolerance  Outcome: Progressing  Intervention: Promote Improved Energy  Flowsheets (Taken 8/5/2025 1001)  Sleep/Rest Enhancement: regular sleep/rest pattern promoted  Activity Management: Ambulated -L4  Environmental Support: calm environment promoted

## 2025-08-05 NOTE — TELEPHONE ENCOUNTER
----- Message from TEE Reynolds sent at 8/4/2025  5:18 PM CDT -----  Please notify the patient that their ANC is low at 500. Can you set him up for Zarxio for 3 days   ----- Message -----  From: Lab, Background User  Sent: 8/4/2025  12:01 PM CDT  To: TEE Reynolds

## 2025-08-06 ENCOUNTER — TELEPHONE (OUTPATIENT)
Facility: CLINIC | Age: 80
End: 2025-08-06
Payer: MEDICARE

## 2025-08-06 ENCOUNTER — INFUSION (OUTPATIENT)
Dept: INFUSION THERAPY | Facility: HOSPITAL | Age: 80
End: 2025-08-06
Attending: INTERNAL MEDICINE
Payer: MEDICARE

## 2025-08-06 VITALS
DIASTOLIC BLOOD PRESSURE: 80 MMHG | RESPIRATION RATE: 18 BRPM | BODY MASS INDEX: 27.11 KG/M2 | WEIGHT: 189.38 LBS | TEMPERATURE: 98 F | HEART RATE: 76 BPM | SYSTOLIC BLOOD PRESSURE: 150 MMHG | HEIGHT: 70 IN

## 2025-08-06 DIAGNOSIS — C34.12 MALIGNANT NEOPLASM OF UPPER LOBE OF LEFT LUNG: Primary | ICD-10-CM

## 2025-08-06 PROCEDURE — 96372 THER/PROPH/DIAG INJ SC/IM: CPT

## 2025-08-06 PROCEDURE — 63600175 PHARM REV CODE 636 W HCPCS: Mod: JZ,JB,TB | Performed by: NURSE PRACTITIONER

## 2025-08-06 RX ADMIN — FILGRASTIM-SNDZ 480 MCG: 480 INJECTION, SOLUTION INTRAVENOUS; SUBCUTANEOUS at 04:08

## 2025-08-06 NOTE — TELEPHONE ENCOUNTER
8/6: Patient requested to be called yesterday regarding vegetarian-friendly foods that boost immune system. Discussed examples of different types of fruit and vegetables to incorporate to help support the immune system, such at those high in vitamin C, E, A, B6, zinc, selenium, and copper. Patient is trying to follow a high-protein diet but finds it difficult with vegetarian diet. We discussed different types of protein sources that he could include. Recommended patient start 1-3 plant-based protein shakes (Orgain or Owyn) to better meet his higher protein needs. He is also taking mult vitamin, vitamin C, and flaxseed for omega-3. Reccommended patient to add another 2000 IU of vitamin D. Patient voiced that will start incorpotting more of these food options and supplements. RD will continue to monitor.     Alisha Mays, MS, RD, LDN

## 2025-08-06 NOTE — PLAN OF CARE
Problem: Fatigue  Goal: Improved Activity Tolerance  8/6/2025 1601 by Angela Guadalupe, RN  Outcome: Met  8/6/2025 1601 by Angela Guadalupe, RN  Outcome: Progressing

## 2025-08-07 ENCOUNTER — HOSPITAL ENCOUNTER (OUTPATIENT)
Dept: RADIOLOGY | Facility: HOSPITAL | Age: 80
Discharge: HOME | End: 2025-08-07
Attending: INTERNAL MEDICINE
Payer: MEDICARE

## 2025-08-07 ENCOUNTER — OFFICE VISIT (OUTPATIENT)
Facility: CLINIC | Age: 80
End: 2025-08-07
Payer: MEDICARE

## 2025-08-07 ENCOUNTER — INFUSION (OUTPATIENT)
Dept: INFUSION THERAPY | Facility: HOSPITAL | Age: 80
End: 2025-08-07
Attending: INTERNAL MEDICINE
Payer: MEDICARE

## 2025-08-07 VITALS
OXYGEN SATURATION: 94 % | WEIGHT: 190.19 LBS | BODY MASS INDEX: 27.18 KG/M2 | HEART RATE: 72 BPM | WEIGHT: 189.88 LBS | SYSTOLIC BLOOD PRESSURE: 123 MMHG | DIASTOLIC BLOOD PRESSURE: 72 MMHG | RESPIRATION RATE: 17 BRPM | DIASTOLIC BLOOD PRESSURE: 58 MMHG | BODY MASS INDEX: 27.29 KG/M2 | TEMPERATURE: 98 F | HEIGHT: 70 IN | SYSTOLIC BLOOD PRESSURE: 137 MMHG | TEMPERATURE: 98 F | HEART RATE: 84 BPM | RESPIRATION RATE: 18 BRPM

## 2025-08-07 DIAGNOSIS — C34.12 MALIGNANT NEOPLASM OF UPPER LOBE OF LEFT LUNG: Primary | ICD-10-CM

## 2025-08-07 DIAGNOSIS — C44.92 SQUAMOUS CELL CARCINOMA METASTATIC TO LYMPH NODES OF HEAD AND NECK: Primary | ICD-10-CM

## 2025-08-07 DIAGNOSIS — C77.0 SQUAMOUS CELL CARCINOMA METASTATIC TO LYMPH NODES OF HEAD AND NECK: ICD-10-CM

## 2025-08-07 DIAGNOSIS — C77.0 SQUAMOUS CELL CARCINOMA METASTATIC TO LYMPH NODES OF HEAD AND NECK: Primary | ICD-10-CM

## 2025-08-07 DIAGNOSIS — C44.92 SQUAMOUS CELL CARCINOMA METASTATIC TO LYMPH NODES OF HEAD AND NECK: ICD-10-CM

## 2025-08-07 PROCEDURE — 99999 PR PBB SHADOW E&M-EST. PATIENT-LVL III: CPT | Mod: PBBFAC,,, | Performed by: INTERNAL MEDICINE

## 2025-08-07 PROCEDURE — 70491 CT SOFT TISSUE NECK W/DYE: CPT | Mod: TC,PO

## 2025-08-07 PROCEDURE — 71260 CT THORAX DX C+: CPT | Mod: 26,,, | Performed by: RADIOLOGY

## 2025-08-07 PROCEDURE — 96372 THER/PROPH/DIAG INJ SC/IM: CPT | Mod: 59

## 2025-08-07 PROCEDURE — 25500020 PHARM REV CODE 255: Mod: PO | Performed by: INTERNAL MEDICINE

## 2025-08-07 PROCEDURE — 70491 CT SOFT TISSUE NECK W/DYE: CPT | Mod: 26,,, | Performed by: RADIOLOGY

## 2025-08-07 PROCEDURE — 99215 OFFICE O/P EST HI 40 MIN: CPT | Mod: S$PBB,,, | Performed by: INTERNAL MEDICINE

## 2025-08-07 PROCEDURE — 63600175 PHARM REV CODE 636 W HCPCS: Mod: JZ,JB,TB | Performed by: NURSE PRACTITIONER

## 2025-08-07 PROCEDURE — 99213 OFFICE O/P EST LOW 20 MIN: CPT | Mod: PBBFAC,PN | Performed by: INTERNAL MEDICINE

## 2025-08-07 PROCEDURE — G2211 COMPLEX E/M VISIT ADD ON: HCPCS | Mod: ,,, | Performed by: INTERNAL MEDICINE

## 2025-08-07 RX ORDER — EPINEPHRINE 0.3 MG/.3ML
0.3 INJECTION SUBCUTANEOUS ONCE AS NEEDED
OUTPATIENT
Start: 2025-08-13

## 2025-08-07 RX ORDER — DIPHENHYDRAMINE HYDROCHLORIDE 50 MG/ML
50 INJECTION, SOLUTION INTRAMUSCULAR; INTRAVENOUS ONCE AS NEEDED
OUTPATIENT
Start: 2025-08-13

## 2025-08-07 RX ORDER — SODIUM CHLORIDE 0.9 % (FLUSH) 0.9 %
10 SYRINGE (ML) INJECTION
OUTPATIENT
Start: 2025-08-13

## 2025-08-07 RX ORDER — HEPARIN 100 UNIT/ML
500 SYRINGE INTRAVENOUS
OUTPATIENT
Start: 2025-08-13

## 2025-08-07 RX ORDER — FAMOTIDINE 10 MG/ML
20 INJECTION, SOLUTION INTRAVENOUS
OUTPATIENT
Start: 2025-08-13

## 2025-08-07 RX ADMIN — FILGRASTIM-SNDZ 480 MCG: 480 INJECTION, SOLUTION INTRAVENOUS; SUBCUTANEOUS at 01:08

## 2025-08-07 RX ADMIN — IOHEXOL 100 ML: 350 INJECTION, SOLUTION INTRAVENOUS at 08:08

## 2025-08-07 NOTE — PROGRESS NOTES
PROGRESS NOTE    Subjective:       Patient ID: Taco Odonnell Jr is a 79 y.o. male.    5/13/2025:  LYMPH NODE, RIGHT CERVICAL, CORE BIOPSY   - POSITIVE FOR SQUAMOUS CELL CARCINOMA   P16 negative    5/26/2025-EBUS:  LN 4L: SCCA  LN 11L: SCCA  LN 7:  SCCA      5/16/2025-PET:  --left mediastinal mass within the sub aortic region AP window and extending into the left hilum measuring 5.7 x 4.2 cm   --multiple radiotracer avid lymph nodes in the level 2, level 3, and left level 4 regions of the neck   --The thyroid gland is enlarged and demonstrates diffuse intense uptake.           Carbo/Taxol/Pembrolizumab Treatment:  Cycle 1: 6/11/2025  Cycle 2: 7/2/2025  Cycle 3: 7/23/2025  Cycle 4: 8/13/2025-due-taxol from 200 to 175mg/m2    Chief Complaint:  No chief complaint on file.  Squamous call carcinoma of unknown primary     History of Present Illness:   Taco Odonnell Jr is a 79 y.o. male who presents for follow up of unknown primary cancer/lung cancer    Patient started therapy and s/p 3 cycles of chemotherapy/IO.  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            Current Medications[1]        Objective:       Physical Examination:     BP (!) 123/58   Pulse 72   Temp 97.7 °F (36.5 °C)   Resp 17   Wt 86.3 kg (190 lb 3.2 oz)   BMI 27.29 kg/m²     Physical Exam  Constitutional:       Appearance: Normal appearance.   HENT:      Head: Normocephalic and atraumatic.   Eyes:      General: No scleral icterus.     Conjunctiva/sclera: Conjunctivae normal.   Cardiovascular:      Rate and Rhythm: Normal rate.   Pulmonary:      Effort: Pulmonary effort is normal.   Abdominal:      General: Abdomen is flat.   Neurological:      General: No focal deficit present.      Mental Status: He is alert and oriented to person, place, and time.   Psychiatric:         Mood and Affect: Mood normal.         Behavior: Behavior normal.         Labs:   Recent  Results (from the past 2 weeks)   CBC with Differential    Collection Time: 08/04/25 11:02 AM   Result Value Ref Range    WBC 2.51 (L) 3.90 - 12.70 K/uL    Hgb 10.9 (L) 14.0 - 18.0 gm/dL    Hct 32.3 (L) 40.0 - 54.0 %    Platelet Count 193 150 - 450 K/uL   CBC with Differential    Collection Time: 07/28/25 11:06 AM   Result Value Ref Range    WBC 2.33 (L) 3.90 - 12.70 K/uL    Hgb 11.1 (L) 14.0 - 18.0 gm/dL    Hct 33.1 (L) 40.0 - 54.0 %    Platelet Count 131 (L) 150 - 450 K/uL     CMP  Sodium   Date Value Ref Range Status   08/04/2025 141 136 - 145 mmol/L Final     Potassium   Date Value Ref Range Status   08/04/2025 4.5 3.5 - 5.1 mmol/L Final     Chloride   Date Value Ref Range Status   08/04/2025 106 95 - 110 mmol/L Final     CO2   Date Value Ref Range Status   08/04/2025 30 (H) 23 - 29 mmol/L Final     Glucose   Date Value Ref Range Status   08/04/2025 97 70 - 110 mg/dL Final     BUN   Date Value Ref Range Status   08/04/2025 15 8 - 23 mg/dL Final     Creatinine   Date Value Ref Range Status   08/04/2025 0.7 0.5 - 1.4 mg/dL Final     Calcium   Date Value Ref Range Status   08/04/2025 9.2 8.7 - 10.5 mg/dL Final     Protein Total   Date Value Ref Range Status   08/04/2025 6.8 6.0 - 8.4 gm/dL Final     Albumin   Date Value Ref Range Status   08/04/2025 4.0 3.5 - 5.2 g/dL Final     Bilirubin Total   Date Value Ref Range Status   08/04/2025 0.4 0.1 - 1.0 mg/dL Final     Comment:     For infants and newborns, interpretation of results should be based   on gestational age, weight and in agreement with clinical   observations.    Premature Infant recommended reference ranges:   0-24 hours:  <8.0 mg/dL   24-48 hours: <12.0 mg/dL   3-5 days:    <15.0 mg/dL   6-29 days:   <15.0 mg/dL     ALP   Date Value Ref Range Status   08/04/2025 72 55 - 135 unit/L Final     AST   Date Value Ref Range Status   08/04/2025 18 10 - 40 unit/L Final     ALT   Date Value Ref Range Status   08/04/2025 18 10 - 44 unit/L Final     Anion Gap   Date  "Value Ref Range Status   08/04/2025 5 (L) 8 - 16 mmol/L Final     eGFR if    Date Value Ref Range Status   07/05/2022 >60.0 >60 mL/min/1.73 m^2 Final     eGFR if non    Date Value Ref Range Status   07/05/2022 >60.0 >60 mL/min/1.73 m^2 Final     Comment:     Calculation used to obtain the estimated glomerular filtration  rate (eGFR) is the CKD-EPI equation.        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  No direct comparison offered.  He is doing ok with the chemotherapy/IO and has no sign of AI disease.  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  Will continue to see him every 3 weeks and continue therapy as planned, labs allowing.               Discussion:     Follow up in about 3 weeks (around 8/28/2025).      Electronically signed by Kyree Melton             [1]   Current Outpatient Medications:     ascorbic acid, vitamin C, (VITAMIN C) 250 MG tablet, Take 250 mg by mouth once daily., Disp: , Rfl:     co-enzyme Q-10 50 mg capsule, Take 200 mg by mouth 2 (two) times daily., Disp: , Rfl:     evolocumab (REPATHA SURECLICK) 140 mg/mL PnIj, 1 mL (140 mg total) by abdominal subcutaneous route every 14 (fourteen) days., Disp: 6 mL, Rfl: 3    ferrous sulfate (FEOSOL) Tab tablet, Take 1 tablet by mouth daily with breakfast., Disp: , Rfl:     FLAXSEED ORAL, Take 1,300 mg by mouth once daily., Disp: , Rfl:     hydroCHLOROthiazide (HYDRODIURIL) 12.5 MG Tab, Take 1 tablet (12.5 mg total) by mouth every morning., Disp: 90 tablet, Rfl: 3    LORazepam (ATIVAN) 0.5 MG tablet, Take " 1 tablet (0.5 mg total) by mouth every 8 (eight) hours as needed for Anxiety., Disp: 30 tablet, Rfl: 2    magnesium oxide (MAG-OX) 400 mg (241.3 mg magnesium) tablet, Take 400 mg by mouth once daily., Disp: , Rfl:     multivit-min/folic acid/lutein (CENTRUM SILVER ORAL), Take 1 tablet by mouth., Disp: , Rfl:     ondansetron (ZOFRAN-ODT) 8 MG TbDL, Take 1 tablet (8 mg total) by mouth every 6 (six) hours as needed (Nausea)., Disp: 30 tablet, Rfl: 6    oxyCODONE-acetaminophen (PERCOCET) 5-325 mg per tablet, take 1 tablet by mouth every 4 hours as needed. Use sparingly, Disp: 60 tablet, Rfl: 0    promethazine (PHENERGAN) 25 MG tablet, Take 1 tablet (25 mg total) by mouth every 6 (six) hours as needed for Nausea., Disp: 30 tablet, Rfl: 6    promethazine (PHENERGAN) 25 MG tablet, Take 1 tablet (25 mg total) by mouth every 6 (six) hours as needed for Nausea., Disp: 15 tablet, Rfl: 0    rivaroxaban (XARELTO) 20 mg Tab, Take 1 tablet (20 mg total) by mouth once daily., Disp: 90 tablet, Rfl: 3    sotaloL (BETAPACE) 80 MG tablet, Take 1 tablet (80 mg total) by mouth 2 (two) times daily., Disp: 180 tablet, Rfl: 3    terazosin (HYTRIN) 5 MG capsule, Take 1 capsule (5 mg total) by mouth every evening., Disp: 90 capsule, Rfl: 3  No current facility-administered medications for this visit.

## 2025-08-07 NOTE — ASSESSMENT & PLAN NOTE
Patient had CT neck and chest and the lymph nodes do appear improved based on measurments on scan.  No direct comparison offered.  He is doing ok with the chemotherapy/IO and has no sign of AI disease.  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  Will continue to see him every 3 weeks and continue therapy as planned, labs allowing.

## 2025-08-11 ENCOUNTER — LAB VISIT (OUTPATIENT)
Dept: LAB | Facility: HOSPITAL | Age: 80
End: 2025-08-11
Attending: INTERNAL MEDICINE
Payer: MEDICARE

## 2025-08-11 DIAGNOSIS — C34.12 MALIGNANT NEOPLASM OF UPPER LOBE OF LEFT LUNG: ICD-10-CM

## 2025-08-11 DIAGNOSIS — Z29.89 IMMUNOTHERAPY: ICD-10-CM

## 2025-08-11 LAB
ABSOLUTE EOSINOPHIL (SMH): 0.04 K/UL
ABSOLUTE MONOCYTE (SMH): 1.04 K/UL (ref 0.3–1)
ABSOLUTE NEUTROPHIL COUNT (SMH): 2.9 K/UL (ref 1.8–7.7)
ALBUMIN SERPL-MCNC: 4 G/DL (ref 3.5–5.2)
ALP SERPL-CCNC: 91 UNIT/L (ref 55–135)
ALT SERPL-CCNC: 17 UNIT/L (ref 10–44)
ANION GAP (SMH): 7 MMOL/L (ref 8–16)
AST SERPL-CCNC: 17 UNIT/L (ref 10–40)
BASOPHILS # BLD AUTO: 0.06 K/UL
BASOPHILS NFR BLD AUTO: 1.1 %
BILIRUB SERPL-MCNC: 0.4 MG/DL (ref 0.1–1)
BUN SERPL-MCNC: 12 MG/DL (ref 8–23)
CALCIUM SERPL-MCNC: 9.4 MG/DL (ref 8.7–10.5)
CHLORIDE SERPL-SCNC: 106 MMOL/L (ref 95–110)
CO2 SERPL-SCNC: 27 MMOL/L (ref 23–29)
CREAT SERPL-MCNC: 0.8 MG/DL (ref 0.5–1.4)
ERYTHROCYTE [DISTWIDTH] IN BLOOD BY AUTOMATED COUNT: 18.4 % (ref 11.5–14.5)
GFR SERPLBLD CREATININE-BSD FMLA CKD-EPI: >60 ML/MIN/1.73/M2
GLUCOSE SERPL-MCNC: 119 MG/DL (ref 70–110)
HCT VFR BLD AUTO: 34.4 % (ref 40–54)
HGB BLD-MCNC: 11.8 GM/DL (ref 14–18)
IMM GRANULOCYTES # BLD AUTO: 0.02 K/UL (ref 0–0.04)
IMM GRANULOCYTES NFR BLD AUTO: 0.4 % (ref 0–0.5)
LYMPHOCYTES # BLD AUTO: 1.25 K/UL (ref 1–4.8)
MAGNESIUM SERPL-MCNC: 1.9 MG/DL (ref 1.6–2.6)
MCH RBC QN AUTO: 32.5 PG (ref 27–31)
MCHC RBC AUTO-ENTMCNC: 34.3 G/DL (ref 32–36)
MCV RBC AUTO: 95 FL (ref 82–98)
NUCLEATED RBC (/100WBC) (SMH): 0 /100 WBC
PLATELET # BLD AUTO: 145 K/UL (ref 150–450)
PMV BLD AUTO: 9.7 FL (ref 9.2–12.9)
POTASSIUM SERPL-SCNC: 4 MMOL/L (ref 3.5–5.1)
PROT SERPL-MCNC: 6.8 GM/DL (ref 6–8.4)
RBC # BLD AUTO: 3.63 M/UL (ref 4.6–6.2)
RELATIVE EOSINOPHIL (SMH): 0.8 % (ref 0–8)
RELATIVE LYMPHOCYTE (SMH): 23.6 % (ref 18–48)
RELATIVE MONOCYTE (SMH): 19.6 % (ref 4–15)
RELATIVE NEUTROPHIL (SMH): 54.5 % (ref 38–73)
SODIUM SERPL-SCNC: 140 MMOL/L (ref 136–145)
WBC # BLD AUTO: 5.3 K/UL (ref 3.9–12.7)

## 2025-08-11 PROCEDURE — 85025 COMPLETE CBC W/AUTO DIFF WBC: CPT

## 2025-08-11 PROCEDURE — 82565 ASSAY OF CREATININE: CPT

## 2025-08-11 PROCEDURE — 83735 ASSAY OF MAGNESIUM: CPT

## 2025-08-11 PROCEDURE — 36415 COLL VENOUS BLD VENIPUNCTURE: CPT

## 2025-08-13 ENCOUNTER — INFUSION (OUTPATIENT)
Dept: INFUSION THERAPY | Facility: HOSPITAL | Age: 80
End: 2025-08-13
Attending: INTERNAL MEDICINE
Payer: MEDICARE

## 2025-08-13 VITALS
OXYGEN SATURATION: 96 % | RESPIRATION RATE: 16 BRPM | HEART RATE: 78 BPM | BODY MASS INDEX: 26.68 KG/M2 | WEIGHT: 186.38 LBS | DIASTOLIC BLOOD PRESSURE: 71 MMHG | HEIGHT: 70 IN | TEMPERATURE: 98 F | SYSTOLIC BLOOD PRESSURE: 159 MMHG

## 2025-08-13 DIAGNOSIS — C34.12 MALIGNANT NEOPLASM OF UPPER LOBE OF LEFT LUNG: Primary | ICD-10-CM

## 2025-08-13 PROCEDURE — 25000003 PHARM REV CODE 250: Performed by: INTERNAL MEDICINE

## 2025-08-13 PROCEDURE — 96367 TX/PROPH/DG ADDL SEQ IV INF: CPT

## 2025-08-13 PROCEDURE — 63600175 PHARM REV CODE 636 W HCPCS: Performed by: INTERNAL MEDICINE

## 2025-08-13 PROCEDURE — A4216 STERILE WATER/SALINE, 10 ML: HCPCS | Performed by: INTERNAL MEDICINE

## 2025-08-13 PROCEDURE — 96413 CHEMO IV INFUSION 1 HR: CPT

## 2025-08-13 PROCEDURE — 96415 CHEMO IV INFUSION ADDL HR: CPT

## 2025-08-13 PROCEDURE — 96417 CHEMO IV INFUS EACH ADDL SEQ: CPT

## 2025-08-13 PROCEDURE — 96375 TX/PRO/DX INJ NEW DRUG ADDON: CPT

## 2025-08-13 RX ORDER — SODIUM CHLORIDE 0.9 % (FLUSH) 0.9 %
10 SYRINGE (ML) INJECTION
Status: DISCONTINUED | OUTPATIENT
Start: 2025-08-13 | End: 2025-08-13 | Stop reason: HOSPADM

## 2025-08-13 RX ORDER — DIPHENHYDRAMINE HYDROCHLORIDE 50 MG/ML
50 INJECTION, SOLUTION INTRAMUSCULAR; INTRAVENOUS ONCE AS NEEDED
Status: DISCONTINUED | OUTPATIENT
Start: 2025-08-13 | End: 2025-08-13 | Stop reason: HOSPADM

## 2025-08-13 RX ORDER — FAMOTIDINE 10 MG/ML
20 INJECTION, SOLUTION INTRAVENOUS
Status: COMPLETED | OUTPATIENT
Start: 2025-08-13 | End: 2025-08-13

## 2025-08-13 RX ORDER — EPINEPHRINE 0.3 MG/.3ML
0.3 INJECTION SUBCUTANEOUS ONCE AS NEEDED
Status: DISCONTINUED | OUTPATIENT
Start: 2025-08-13 | End: 2025-08-13 | Stop reason: HOSPADM

## 2025-08-13 RX ORDER — HEPARIN 100 UNIT/ML
500 SYRINGE INTRAVENOUS
Status: DISCONTINUED | OUTPATIENT
Start: 2025-08-13 | End: 2025-08-13 | Stop reason: HOSPADM

## 2025-08-13 RX ADMIN — DIPHENHYDRAMINE HYDROCHLORIDE 50 MG: 50 INJECTION INTRAMUSCULAR; INTRAVENOUS at 08:08

## 2025-08-13 RX ADMIN — SODIUM CHLORIDE: 9 INJECTION, SOLUTION INTRAVENOUS at 07:08

## 2025-08-13 RX ADMIN — PACLITAXEL 360 MG: 6 INJECTION, SOLUTION INTRAVENOUS at 09:08

## 2025-08-13 RX ADMIN — FAMOTIDINE 20 MG: 10 INJECTION INTRAVENOUS at 08:08

## 2025-08-13 RX ADMIN — APREPITANT 130 MG: 130 INJECTION, EMULSION INTRAVENOUS at 08:08

## 2025-08-13 RX ADMIN — SODIUM CHLORIDE 200 MG: 9 INJECTION, SOLUTION INTRAVENOUS at 07:08

## 2025-08-13 RX ADMIN — DEXAMETHASONE SODIUM PHOSPHATE 0.25 MG: 4 INJECTION, SOLUTION INTRA-ARTICULAR; INTRALESIONAL; INTRAMUSCULAR; INTRAVENOUS; SOFT TISSUE at 08:08

## 2025-08-13 RX ADMIN — SODIUM CHLORIDE, PRESERVATIVE FREE 10 ML: 5 INJECTION INTRAVENOUS at 01:08

## 2025-08-13 RX ADMIN — HEPARIN 500 UNITS: 100 SYRINGE at 01:08

## 2025-08-13 RX ADMIN — CARBOPLATIN 590 MG: 10 INJECTION, SOLUTION INTRAVENOUS at 11:08

## 2025-08-14 RX ORDER — FAMOTIDINE 10 MG/ML
20 INJECTION, SOLUTION INTRAVENOUS
Status: CANCELLED | OUTPATIENT
Start: 2025-08-15 | End: 2025-08-14

## 2025-08-14 RX ORDER — FAMOTIDINE 10 MG/ML
20 INJECTION, SOLUTION INTRAVENOUS
OUTPATIENT
Start: 2025-09-09 | End: 2025-08-23

## 2025-08-14 RX ORDER — SODIUM CHLORIDE 0.9 % (FLUSH) 0.9 %
10 SYRINGE (ML) INJECTION
Status: CANCELLED | OUTPATIENT
Start: 2025-08-15

## 2025-08-14 RX ORDER — SODIUM CHLORIDE 0.9 % (FLUSH) 0.9 %
10 SYRINGE (ML) INJECTION
OUTPATIENT
Start: 2025-09-09

## 2025-08-14 RX ORDER — HEPARIN 100 UNIT/ML
500 SYRINGE INTRAVENOUS
OUTPATIENT
Start: 2025-09-09

## 2025-08-14 RX ORDER — HEPARIN 100 UNIT/ML
500 SYRINGE INTRAVENOUS
Status: CANCELLED | OUTPATIENT
Start: 2025-08-15

## 2025-08-15 ENCOUNTER — INFUSION (OUTPATIENT)
Dept: INFUSION THERAPY | Facility: HOSPITAL | Age: 80
End: 2025-08-15
Attending: INTERNAL MEDICINE
Payer: MEDICARE

## 2025-08-15 VITALS
OXYGEN SATURATION: 99 % | HEART RATE: 55 BPM | WEIGHT: 191 LBS | DIASTOLIC BLOOD PRESSURE: 82 MMHG | HEIGHT: 70 IN | TEMPERATURE: 97 F | RESPIRATION RATE: 18 BRPM | SYSTOLIC BLOOD PRESSURE: 151 MMHG | BODY MASS INDEX: 27.35 KG/M2

## 2025-08-15 DIAGNOSIS — C34.12 MALIGNANT NEOPLASM OF UPPER LOBE OF LEFT LUNG: ICD-10-CM

## 2025-08-15 DIAGNOSIS — C77.1 SECONDARY AND UNSPECIFIED MALIGNANT NEOPLASM OF INTRATHORACIC LYMPH NODES: ICD-10-CM

## 2025-08-15 DIAGNOSIS — E86.0 DEHYDRATION: Primary | ICD-10-CM

## 2025-08-15 PROCEDURE — 96361 HYDRATE IV INFUSION ADD-ON: CPT

## 2025-08-15 PROCEDURE — 25000003 PHARM REV CODE 250: Performed by: NURSE PRACTITIONER

## 2025-08-15 PROCEDURE — A4216 STERILE WATER/SALINE, 10 ML: HCPCS | Performed by: NURSE PRACTITIONER

## 2025-08-15 PROCEDURE — 63600175 PHARM REV CODE 636 W HCPCS: Performed by: NURSE PRACTITIONER

## 2025-08-15 PROCEDURE — 96374 THER/PROPH/DIAG INJ IV PUSH: CPT

## 2025-08-15 RX ORDER — HEPARIN 100 UNIT/ML
500 SYRINGE INTRAVENOUS
Status: DISCONTINUED | OUTPATIENT
Start: 2025-08-15 | End: 2025-08-15 | Stop reason: HOSPADM

## 2025-08-15 RX ORDER — SODIUM CHLORIDE 0.9 % (FLUSH) 0.9 %
10 SYRINGE (ML) INJECTION
Status: DISCONTINUED | OUTPATIENT
Start: 2025-08-15 | End: 2025-08-15 | Stop reason: HOSPADM

## 2025-08-15 RX ORDER — FAMOTIDINE 10 MG/ML
20 INJECTION, SOLUTION INTRAVENOUS
Status: COMPLETED | OUTPATIENT
Start: 2025-08-15 | End: 2025-08-15

## 2025-08-15 RX ADMIN — SODIUM CHLORIDE, PRESERVATIVE FREE 10 ML: 5 INJECTION INTRAVENOUS at 03:08

## 2025-08-15 RX ADMIN — HEPARIN 500 UNITS: 100 SYRINGE at 04:08

## 2025-08-15 RX ADMIN — SODIUM CHLORIDE 1000 ML: 9 INJECTION, SOLUTION INTRAVENOUS at 02:08

## 2025-08-15 RX ADMIN — FAMOTIDINE 20 MG: 10 INJECTION INTRAVENOUS at 02:08

## 2025-08-18 ENCOUNTER — LAB VISIT (OUTPATIENT)
Dept: LAB | Facility: HOSPITAL | Age: 80
End: 2025-08-18
Attending: INTERNAL MEDICINE
Payer: MEDICARE

## 2025-08-18 DIAGNOSIS — Z29.89 IMMUNOTHERAPY: ICD-10-CM

## 2025-08-18 DIAGNOSIS — C34.12 MALIGNANT NEOPLASM OF UPPER LOBE OF LEFT LUNG: ICD-10-CM

## 2025-08-18 LAB
ABSOLUTE EOSINOPHIL (SMH): 0.02 K/UL
ABSOLUTE MONOCYTE (SMH): 0.06 K/UL (ref 0.3–1)
ABSOLUTE NEUTROPHIL COUNT (SMH): 1.5 K/UL (ref 1.8–7.7)
ALBUMIN SERPL-MCNC: 4 G/DL (ref 3.5–5.2)
ALP SERPL-CCNC: 67 UNIT/L (ref 55–135)
ALT SERPL-CCNC: 16 UNIT/L (ref 10–44)
ANION GAP (SMH): 4 MMOL/L (ref 8–16)
AST SERPL-CCNC: 17 UNIT/L (ref 10–40)
BASOPHILS # BLD AUTO: 0.02 K/UL
BASOPHILS NFR BLD AUTO: 0.7 %
BILIRUB SERPL-MCNC: 0.6 MG/DL (ref 0.1–1)
BUN SERPL-MCNC: 20 MG/DL (ref 8–23)
CALCIUM SERPL-MCNC: 9.6 MG/DL (ref 8.7–10.5)
CHLORIDE SERPL-SCNC: 102 MMOL/L (ref 95–110)
CO2 SERPL-SCNC: 31 MMOL/L (ref 23–29)
CREAT SERPL-MCNC: 0.7 MG/DL (ref 0.5–1.4)
ERYTHROCYTE [DISTWIDTH] IN BLOOD BY AUTOMATED COUNT: 17.7 % (ref 11.5–14.5)
GFR SERPLBLD CREATININE-BSD FMLA CKD-EPI: >60 ML/MIN/1.73/M2
GLUCOSE SERPL-MCNC: 132 MG/DL (ref 70–110)
HCT VFR BLD AUTO: 32.2 % (ref 40–54)
HGB BLD-MCNC: 11 GM/DL (ref 14–18)
IMM GRANULOCYTES # BLD AUTO: 0.02 K/UL (ref 0–0.04)
IMM GRANULOCYTES NFR BLD AUTO: 0.7 % (ref 0–0.5)
LYMPHOCYTES # BLD AUTO: 1.1 K/UL (ref 1–4.8)
MAGNESIUM SERPL-MCNC: 1.8 MG/DL (ref 1.6–2.6)
MCH RBC QN AUTO: 32.7 PG (ref 27–31)
MCHC RBC AUTO-ENTMCNC: 34.2 G/DL (ref 32–36)
MCV RBC AUTO: 96 FL (ref 82–98)
NUCLEATED RBC (/100WBC) (SMH): 0 /100 WBC
PLATELET # BLD AUTO: 91 K/UL (ref 150–450)
PLATELET BLD QL SMEAR: ABNORMAL
PMV BLD AUTO: 11.5 FL (ref 9.2–12.9)
POTASSIUM SERPL-SCNC: 4.2 MMOL/L (ref 3.5–5.1)
PROT SERPL-MCNC: 6.7 GM/DL (ref 6–8.4)
RBC # BLD AUTO: 3.36 M/UL (ref 4.6–6.2)
RELATIVE EOSINOPHIL (SMH): 0.7 % (ref 0–8)
RELATIVE LYMPHOCYTE (SMH): 40 % (ref 18–48)
RELATIVE MONOCYTE (SMH): 2.2 % (ref 4–15)
RELATIVE NEUTROPHIL (SMH): 55.7 % (ref 38–73)
SODIUM SERPL-SCNC: 137 MMOL/L (ref 136–145)
WBC # BLD AUTO: 2.75 K/UL (ref 3.9–12.7)

## 2025-08-18 PROCEDURE — 85025 COMPLETE CBC W/AUTO DIFF WBC: CPT

## 2025-08-18 PROCEDURE — 36415 COLL VENOUS BLD VENIPUNCTURE: CPT

## 2025-08-18 PROCEDURE — 84460 ALANINE AMINO (ALT) (SGPT): CPT

## 2025-08-18 PROCEDURE — 83735 ASSAY OF MAGNESIUM: CPT

## 2025-08-21 ENCOUNTER — OFFICE VISIT (OUTPATIENT)
Dept: CARDIOLOGY | Facility: CLINIC | Age: 80
End: 2025-08-21
Payer: MEDICARE

## 2025-08-21 VITALS
HEIGHT: 70 IN | BODY MASS INDEX: 27.24 KG/M2 | OXYGEN SATURATION: 98 % | SYSTOLIC BLOOD PRESSURE: 141 MMHG | HEART RATE: 70 BPM | DIASTOLIC BLOOD PRESSURE: 68 MMHG | WEIGHT: 190.25 LBS

## 2025-08-21 DIAGNOSIS — Z78.9 STATIN INTOLERANCE: ICD-10-CM

## 2025-08-21 DIAGNOSIS — I48.0 PAROXYSMAL ATRIAL FIBRILLATION: ICD-10-CM

## 2025-08-21 DIAGNOSIS — I87.2 VENOUS INSUFFICIENCY: ICD-10-CM

## 2025-08-21 DIAGNOSIS — I25.10 CORONARY ARTERY DISEASE INVOLVING NATIVE CORONARY ARTERY OF NATIVE HEART WITHOUT ANGINA PECTORIS: ICD-10-CM

## 2025-08-21 DIAGNOSIS — I10 BENIGN HYPERTENSION: ICD-10-CM

## 2025-08-21 DIAGNOSIS — E78.5 HYPERLIPIDEMIA WITH TARGET LOW DENSITY LIPOPROTEIN (LDL) CHOLESTEROL LESS THAN 70 MG/DL: Primary | ICD-10-CM

## 2025-08-21 PROCEDURE — 99999 PR PBB SHADOW E&M-EST. PATIENT-LVL III: CPT | Mod: PBBFAC,,,

## 2025-08-21 PROCEDURE — 99213 OFFICE O/P EST LOW 20 MIN: CPT | Mod: PBBFAC,PN

## 2025-08-21 RX ORDER — EVOLOCUMAB 140 MG/ML
140 INJECTION, SOLUTION SUBCUTANEOUS
Qty: 6 ML | Refills: 3 | Status: SHIPPED | OUTPATIENT
Start: 2025-08-21 | End: 2026-08-21

## 2025-08-21 RX ORDER — SOTALOL HYDROCHLORIDE 80 MG/1
80 TABLET ORAL 2 TIMES DAILY
Qty: 180 TABLET | Refills: 3 | Status: SHIPPED | OUTPATIENT
Start: 2025-08-21 | End: 2026-08-21

## 2025-08-21 RX ORDER — LANOLIN ALCOHOL/MO/W.PET/CERES
400 CREAM (GRAM) TOPICAL DAILY
Qty: 90 TABLET | Refills: 3 | Status: SHIPPED | OUTPATIENT
Start: 2025-08-21

## 2025-08-21 RX ORDER — HYDROCHLOROTHIAZIDE 12.5 MG/1
12.5 TABLET ORAL EVERY MORNING
Qty: 90 TABLET | Refills: 3 | Status: SHIPPED | OUTPATIENT
Start: 2025-08-21 | End: 2026-08-21

## 2025-08-21 RX ORDER — TERAZOSIN 5 MG/1
5 CAPSULE ORAL NIGHTLY
Qty: 90 CAPSULE | Refills: 3 | Status: SHIPPED | OUTPATIENT
Start: 2025-08-21 | End: 2026-08-21

## 2025-08-21 RX ORDER — OMEPRAZOLE 20 MG/1
20 CAPSULE, DELAYED RELEASE ORAL DAILY PRN
COMMUNITY
Start: 2025-07-03

## 2025-08-25 ENCOUNTER — LAB VISIT (OUTPATIENT)
Dept: LAB | Facility: HOSPITAL | Age: 80
End: 2025-08-25
Attending: INTERNAL MEDICINE
Payer: MEDICARE

## 2025-08-25 ENCOUNTER — TELEPHONE (OUTPATIENT)
Facility: CLINIC | Age: 80
End: 2025-08-25
Payer: MEDICARE

## 2025-08-25 DIAGNOSIS — Z29.89 IMMUNOTHERAPY: ICD-10-CM

## 2025-08-25 DIAGNOSIS — C34.12 MALIGNANT NEOPLASM OF UPPER LOBE OF LEFT LUNG: ICD-10-CM

## 2025-08-25 LAB
ABSOLUTE EOSINOPHIL (SMH): 0.04 K/UL
ABSOLUTE MONOCYTE (SMH): 0.45 K/UL (ref 0.3–1)
ABSOLUTE NEUTROPHIL COUNT (SMH): 0.4 K/UL (ref 1.8–7.7)
ALBUMIN SERPL-MCNC: 4 G/DL (ref 3.5–5.2)
ALP SERPL-CCNC: 69 UNIT/L (ref 55–135)
ALT SERPL-CCNC: 18 UNIT/L (ref 10–44)
ANION GAP (SMH): 6 MMOL/L (ref 8–16)
AST SERPL-CCNC: 19 UNIT/L (ref 10–40)
BASOPHILS # BLD AUTO: 0.02 K/UL
BASOPHILS NFR BLD AUTO: 1 %
BILIRUB SERPL-MCNC: 0.4 MG/DL (ref 0.1–1)
BUN SERPL-MCNC: 9 MG/DL (ref 8–23)
CALCIUM SERPL-MCNC: 9.3 MG/DL (ref 8.7–10.5)
CHLORIDE SERPL-SCNC: 107 MMOL/L (ref 95–110)
CO2 SERPL-SCNC: 27 MMOL/L (ref 23–29)
CREAT SERPL-MCNC: 0.7 MG/DL (ref 0.5–1.4)
ERYTHROCYTE [DISTWIDTH] IN BLOOD BY AUTOMATED COUNT: 18.4 % (ref 11.5–14.5)
GFR SERPLBLD CREATININE-BSD FMLA CKD-EPI: >60 ML/MIN/1.73/M2
GLUCOSE SERPL-MCNC: 109 MG/DL (ref 70–110)
HCT VFR BLD AUTO: 31.5 % (ref 40–54)
HGB BLD-MCNC: 10.9 GM/DL (ref 14–18)
IMM GRANULOCYTES # BLD AUTO: 0 K/UL (ref 0–0.04)
IMM GRANULOCYTES NFR BLD AUTO: 0 % (ref 0–0.5)
LYMPHOCYTES # BLD AUTO: 1.12 K/UL (ref 1–4.8)
MAGNESIUM SERPL-MCNC: 2 MG/DL (ref 1.6–2.6)
MCH RBC QN AUTO: 33.9 PG (ref 27–31)
MCHC RBC AUTO-ENTMCNC: 34.6 G/DL (ref 32–36)
MCV RBC AUTO: 98 FL (ref 82–98)
NUCLEATED RBC (/100WBC) (SMH): 0 /100 WBC
PLATELET # BLD AUTO: 126 K/UL (ref 150–450)
PMV BLD AUTO: 10 FL (ref 9.2–12.9)
POTASSIUM SERPL-SCNC: 4 MMOL/L (ref 3.5–5.1)
PROT SERPL-MCNC: 6.5 GM/DL (ref 6–8.4)
RBC # BLD AUTO: 3.22 M/UL (ref 4.6–6.2)
RELATIVE EOSINOPHIL (SMH): 1.9 % (ref 0–8)
RELATIVE LYMPHOCYTE (SMH): 54.4 % (ref 18–48)
RELATIVE MONOCYTE (SMH): 21.8 % (ref 4–15)
RELATIVE NEUTROPHIL (SMH): 20.9 % (ref 38–73)
SODIUM SERPL-SCNC: 140 MMOL/L (ref 136–145)
WBC # BLD AUTO: 2.06 K/UL (ref 3.9–12.7)

## 2025-08-25 PROCEDURE — 82310 ASSAY OF CALCIUM: CPT

## 2025-08-25 PROCEDURE — 83735 ASSAY OF MAGNESIUM: CPT

## 2025-08-25 PROCEDURE — 36415 COLL VENOUS BLD VENIPUNCTURE: CPT

## 2025-08-25 PROCEDURE — 85025 COMPLETE CBC W/AUTO DIFF WBC: CPT

## 2025-09-02 ENCOUNTER — LAB VISIT (OUTPATIENT)
Dept: LAB | Facility: HOSPITAL | Age: 80
End: 2025-09-02
Attending: INTERNAL MEDICINE
Payer: MEDICARE

## 2025-09-02 ENCOUNTER — OFFICE VISIT (OUTPATIENT)
Facility: CLINIC | Age: 80
End: 2025-09-02
Payer: MEDICARE

## 2025-09-02 VITALS
SYSTOLIC BLOOD PRESSURE: 136 MMHG | RESPIRATION RATE: 16 BRPM | WEIGHT: 191 LBS | DIASTOLIC BLOOD PRESSURE: 66 MMHG | HEART RATE: 67 BPM | BODY MASS INDEX: 27.41 KG/M2 | TEMPERATURE: 98 F

## 2025-09-02 DIAGNOSIS — C34.12 MALIGNANT NEOPLASM OF UPPER LOBE OF LEFT LUNG: ICD-10-CM

## 2025-09-02 DIAGNOSIS — C44.92 SQUAMOUS CELL CARCINOMA METASTATIC TO LYMPH NODES OF HEAD AND NECK: Primary | ICD-10-CM

## 2025-09-02 DIAGNOSIS — C77.0 SQUAMOUS CELL CARCINOMA METASTATIC TO LYMPH NODES OF HEAD AND NECK: Primary | ICD-10-CM

## 2025-09-02 DIAGNOSIS — Z29.89 IMMUNOTHERAPY: ICD-10-CM

## 2025-09-02 LAB
ABSOLUTE EOSINOPHIL (SMH): 0.03 K/UL
ABSOLUTE MONOCYTE (SMH): 0.81 K/UL (ref 0.3–1)
ABSOLUTE NEUTROPHIL COUNT (SMH): 1.8 K/UL (ref 1.8–7.7)
ALBUMIN SERPL-MCNC: 4.2 G/DL (ref 3.5–5.2)
ALP SERPL-CCNC: 80 UNIT/L (ref 55–135)
ALT SERPL-CCNC: 17 UNIT/L (ref 10–44)
ANION GAP (SMH): 7 MMOL/L (ref 8–16)
AST SERPL-CCNC: 19 UNIT/L (ref 10–40)
BASOPHILS # BLD AUTO: 0.04 K/UL
BASOPHILS NFR BLD AUTO: 1 %
BILIRUB SERPL-MCNC: 0.4 MG/DL (ref 0.1–1)
BUN SERPL-MCNC: 10 MG/DL (ref 8–23)
CALCIUM SERPL-MCNC: 9.4 MG/DL (ref 8.7–10.5)
CHLORIDE SERPL-SCNC: 106 MMOL/L (ref 95–110)
CO2 SERPL-SCNC: 28 MMOL/L (ref 23–29)
CREAT SERPL-MCNC: 0.7 MG/DL (ref 0.5–1.4)
ERYTHROCYTE [DISTWIDTH] IN BLOOD BY AUTOMATED COUNT: 18 % (ref 11.5–14.5)
GFR SERPLBLD CREATININE-BSD FMLA CKD-EPI: >60 ML/MIN/1.73/M2
GLUCOSE SERPL-MCNC: 115 MG/DL (ref 70–110)
HCT VFR BLD AUTO: 34.4 % (ref 40–54)
HGB BLD-MCNC: 11.9 GM/DL (ref 14–18)
IMM GRANULOCYTES # BLD AUTO: 0.01 K/UL (ref 0–0.04)
IMM GRANULOCYTES NFR BLD AUTO: 0.2 % (ref 0–0.5)
LYMPHOCYTES # BLD AUTO: 1.48 K/UL (ref 1–4.8)
MAGNESIUM SERPL-MCNC: 1.9 MG/DL (ref 1.6–2.6)
MCH RBC QN AUTO: 33.9 PG (ref 27–31)
MCHC RBC AUTO-ENTMCNC: 34.6 G/DL (ref 32–36)
MCV RBC AUTO: 98 FL (ref 82–98)
NUCLEATED RBC (/100WBC) (SMH): 0 /100 WBC
PLATELET # BLD AUTO: 153 K/UL (ref 150–450)
PMV BLD AUTO: 9.6 FL (ref 9.2–12.9)
POTASSIUM SERPL-SCNC: 4.1 MMOL/L (ref 3.5–5.1)
PROT SERPL-MCNC: 6.9 GM/DL (ref 6–8.4)
RBC # BLD AUTO: 3.51 M/UL (ref 4.6–6.2)
RELATIVE EOSINOPHIL (SMH): 0.7 % (ref 0–8)
RELATIVE LYMPHOCYTE (SMH): 35.9 % (ref 18–48)
RELATIVE MONOCYTE (SMH): 19.7 % (ref 4–15)
RELATIVE NEUTROPHIL (SMH): 42.5 % (ref 38–73)
SODIUM SERPL-SCNC: 141 MMOL/L (ref 136–145)
WBC # BLD AUTO: 4.12 K/UL (ref 3.9–12.7)

## 2025-09-02 PROCEDURE — 84295 ASSAY OF SERUM SODIUM: CPT

## 2025-09-02 PROCEDURE — 83735 ASSAY OF MAGNESIUM: CPT

## 2025-09-02 PROCEDURE — 99215 OFFICE O/P EST HI 40 MIN: CPT | Mod: S$PBB,,, | Performed by: INTERNAL MEDICINE

## 2025-09-02 PROCEDURE — 99213 OFFICE O/P EST LOW 20 MIN: CPT | Mod: PBBFAC,PN | Performed by: INTERNAL MEDICINE

## 2025-09-02 PROCEDURE — 99999 PR PBB SHADOW E&M-EST. PATIENT-LVL III: CPT | Mod: PBBFAC,,, | Performed by: INTERNAL MEDICINE

## 2025-09-02 PROCEDURE — G2211 COMPLEX E/M VISIT ADD ON: HCPCS | Mod: ,,, | Performed by: INTERNAL MEDICINE

## 2025-09-02 PROCEDURE — 85025 COMPLETE CBC W/AUTO DIFF WBC: CPT

## 2025-09-02 PROCEDURE — 36415 COLL VENOUS BLD VENIPUNCTURE: CPT

## 2025-09-02 RX ORDER — DIPHENHYDRAMINE HYDROCHLORIDE 50 MG/ML
50 INJECTION, SOLUTION INTRAMUSCULAR; INTRAVENOUS ONCE AS NEEDED
Status: CANCELLED | OUTPATIENT
Start: 2025-09-02 | End: 2037-01-29

## 2025-09-02 RX ORDER — HEPARIN 100 UNIT/ML
500 SYRINGE INTRAVENOUS
Status: CANCELLED | OUTPATIENT
Start: 2025-09-02

## 2025-09-02 RX ORDER — EPINEPHRINE 0.3 MG/.3ML
0.3 INJECTION SUBCUTANEOUS ONCE AS NEEDED
Status: CANCELLED | OUTPATIENT
Start: 2025-09-02 | End: 2037-01-29

## 2025-09-02 RX ORDER — SODIUM CHLORIDE 0.9 % (FLUSH) 0.9 %
10 SYRINGE (ML) INJECTION
Status: CANCELLED | OUTPATIENT
Start: 2025-09-02

## 2025-09-03 ENCOUNTER — INFUSION (OUTPATIENT)
Dept: INFUSION THERAPY | Facility: HOSPITAL | Age: 80
End: 2025-09-03
Attending: INTERNAL MEDICINE
Payer: MEDICARE

## 2025-09-03 VITALS
WEIGHT: 192 LBS | DIASTOLIC BLOOD PRESSURE: 71 MMHG | HEIGHT: 70 IN | RESPIRATION RATE: 16 BRPM | BODY MASS INDEX: 27.49 KG/M2 | TEMPERATURE: 98 F | SYSTOLIC BLOOD PRESSURE: 149 MMHG | OXYGEN SATURATION: 97 % | HEART RATE: 56 BPM

## 2025-09-03 DIAGNOSIS — C34.12 MALIGNANT NEOPLASM OF UPPER LOBE OF LEFT LUNG: Primary | ICD-10-CM

## 2025-09-03 PROCEDURE — A4216 STERILE WATER/SALINE, 10 ML: HCPCS | Performed by: INTERNAL MEDICINE

## 2025-09-03 PROCEDURE — 25000003 PHARM REV CODE 250: Performed by: INTERNAL MEDICINE

## 2025-09-03 PROCEDURE — 96413 CHEMO IV INFUSION 1 HR: CPT

## 2025-09-03 PROCEDURE — 63600175 PHARM REV CODE 636 W HCPCS: Mod: JZ,TB | Performed by: INTERNAL MEDICINE

## 2025-09-03 RX ORDER — HEPARIN 100 UNIT/ML
500 SYRINGE INTRAVENOUS
Status: DISCONTINUED | OUTPATIENT
Start: 2025-09-03 | End: 2025-09-03 | Stop reason: HOSPADM

## 2025-09-03 RX ORDER — SODIUM CHLORIDE 0.9 % (FLUSH) 0.9 %
10 SYRINGE (ML) INJECTION
Status: DISCONTINUED | OUTPATIENT
Start: 2025-09-03 | End: 2025-09-03 | Stop reason: HOSPADM

## 2025-09-03 RX ADMIN — SODIUM CHLORIDE 200 MG: 9 INJECTION, SOLUTION INTRAVENOUS at 07:09

## 2025-09-03 RX ADMIN — HEPARIN 500 UNITS: 100 SYRINGE at 08:09

## 2025-09-03 RX ADMIN — SODIUM CHLORIDE, PRESERVATIVE FREE 10 ML: 5 INJECTION INTRAVENOUS at 08:09

## 2025-09-04 ENCOUNTER — SOCIAL WORK (OUTPATIENT)
Dept: HEMATOLOGY/ONCOLOGY | Facility: CLINIC | Age: 80
End: 2025-09-04
Payer: MEDICARE

## 2025-09-04 ENCOUNTER — DOCUMENTATION ONLY (OUTPATIENT)
Dept: RADIATION ONCOLOGY | Facility: CLINIC | Age: 80
End: 2025-09-04

## 2025-09-04 ENCOUNTER — OFFICE VISIT (OUTPATIENT)
Dept: RADIATION ONCOLOGY | Facility: CLINIC | Age: 80
End: 2025-09-04
Payer: MEDICARE

## 2025-09-04 VITALS
BODY MASS INDEX: 27.54 KG/M2 | HEART RATE: 67 BPM | SYSTOLIC BLOOD PRESSURE: 153 MMHG | TEMPERATURE: 98 F | DIASTOLIC BLOOD PRESSURE: 53 MMHG | RESPIRATION RATE: 18 BRPM | WEIGHT: 192.38 LBS | HEIGHT: 70 IN

## 2025-09-04 DIAGNOSIS — C34.12 MALIGNANT NEOPLASM OF UPPER LOBE OF LEFT LUNG: Primary | ICD-10-CM

## 2025-09-04 DIAGNOSIS — C44.92 SQUAMOUS CELL CARCINOMA METASTATIC TO LYMPH NODES OF HEAD AND NECK: ICD-10-CM

## 2025-09-04 DIAGNOSIS — C77.1 SECONDARY AND UNSPECIFIED MALIGNANT NEOPLASM OF INTRATHORACIC LYMPH NODES: ICD-10-CM

## 2025-09-04 DIAGNOSIS — C77.0 SQUAMOUS CELL CARCINOMA METASTATIC TO LYMPH NODES OF HEAD AND NECK: ICD-10-CM

## 2025-09-05 ENCOUNTER — INFUSION (OUTPATIENT)
Dept: INFUSION THERAPY | Facility: HOSPITAL | Age: 80
End: 2025-09-05
Attending: INTERNAL MEDICINE
Payer: MEDICARE

## 2025-09-05 VITALS
HEART RATE: 52 BPM | WEIGHT: 191.5 LBS | OXYGEN SATURATION: 97 % | DIASTOLIC BLOOD PRESSURE: 73 MMHG | BODY MASS INDEX: 27.41 KG/M2 | TEMPERATURE: 98 F | RESPIRATION RATE: 18 BRPM | SYSTOLIC BLOOD PRESSURE: 157 MMHG | HEIGHT: 70 IN

## 2025-09-05 DIAGNOSIS — E86.0 DEHYDRATION: Primary | ICD-10-CM

## 2025-09-05 DIAGNOSIS — C34.12 MALIGNANT NEOPLASM OF UPPER LOBE OF LEFT LUNG: ICD-10-CM

## 2025-09-05 DIAGNOSIS — C77.1 SECONDARY AND UNSPECIFIED MALIGNANT NEOPLASM OF INTRATHORACIC LYMPH NODES: ICD-10-CM

## 2025-09-05 PROCEDURE — 96374 THER/PROPH/DIAG INJ IV PUSH: CPT

## 2025-09-05 PROCEDURE — 96361 HYDRATE IV INFUSION ADD-ON: CPT

## 2025-09-05 PROCEDURE — 96360 HYDRATION IV INFUSION INIT: CPT

## 2025-09-05 PROCEDURE — 63600175 PHARM REV CODE 636 W HCPCS: Performed by: NURSE PRACTITIONER

## 2025-09-05 PROCEDURE — 25000003 PHARM REV CODE 250: Performed by: NURSE PRACTITIONER

## 2025-09-05 RX ORDER — HEPARIN 100 UNIT/ML
500 SYRINGE INTRAVENOUS
Status: DISCONTINUED | OUTPATIENT
Start: 2025-09-05 | End: 2025-09-05 | Stop reason: HOSPADM

## 2025-09-05 RX ORDER — FAMOTIDINE 10 MG/ML
20 INJECTION, SOLUTION INTRAVENOUS
Status: COMPLETED | OUTPATIENT
Start: 2025-09-05 | End: 2025-09-05

## 2025-09-05 RX ORDER — SODIUM CHLORIDE 0.9 % (FLUSH) 0.9 %
10 SYRINGE (ML) INJECTION
Status: DISCONTINUED | OUTPATIENT
Start: 2025-09-05 | End: 2025-09-05 | Stop reason: HOSPADM

## 2025-09-05 RX ADMIN — SODIUM CHLORIDE 1000 ML: 9 INJECTION, SOLUTION INTRAVENOUS at 08:09

## 2025-09-05 RX ADMIN — HEPARIN 500 UNITS: 100 SYRINGE at 10:09

## 2025-09-05 RX ADMIN — FAMOTIDINE 20 MG: 10 INJECTION INTRAVENOUS at 08:09

## (undated) DEVICE — DRAPE THREE-QTR REINF 53X77IN

## (undated) DEVICE — MATRIX HEMOSTATIC FLOSEAL 5ML

## (undated) DEVICE — STRIP MEDI WND CLSR 1X5IN

## (undated) DEVICE — COVER LIGHT HANDLE 80/CA

## (undated) DEVICE — COVER PROXIMA MAYO STAND

## (undated) DEVICE — KIT PT CARE FRME POS JACK

## (undated) DEVICE — PACK MINOR SMH

## (undated) DEVICE — ALCOHOL 70% ANTISEPTIC ISO 4OZ

## (undated) DEVICE — GLOVE SENSICARE PI ALOE 8

## (undated) DEVICE — BLADE ELECTRO EDGE INSULATED

## (undated) DEVICE — NDL ECLIPSE SAFETY 23G 1.5IN

## (undated) DEVICE — SUT CTD VICRYL CT-1 27

## (undated) DEVICE — SYR 10CC LUER LOCK

## (undated) DEVICE — SOL NORMAL USPCA 0.9%

## (undated) DEVICE — SUT MCRYL PLUS 4-0 PS2 27IN

## (undated) DEVICE — NDL SAFETY 21G X 1 1/2 ECLPSE

## (undated) DEVICE — DRAPE INVISISHIELD TOWEL SMALL

## (undated) DEVICE — ADHESIVE MASTISOL VIAL 48/BX

## (undated) DEVICE — DRAIN SINGLE ROUND 1/8 10F

## (undated) DEVICE — ELECTRODE BLADE INSULATED 1 IN

## (undated) DEVICE — ADHESIVE DERMABOND ADVANCED

## (undated) DEVICE — SOL NACL IRR 1000ML BTL

## (undated) DEVICE — SUT CTD VICRYL 1 UND BR

## (undated) DEVICE — GLOVE SENSICARE PI GRN 8

## (undated) DEVICE — COVER CAMERA OPERATING ROOM

## (undated) DEVICE — DRAPE STERI INSTRUMENT 1018

## (undated) DEVICE — EVACUATOR WOUND BULB 100CC

## (undated) DEVICE — SUT ETHILON 2-0 FSLX 30 BLK

## (undated) DEVICE — SUT VICRYL PLUS 3-0 SH 18IN

## (undated) DEVICE — SPONGE BULKEE II ABSRB 6X6.75

## (undated) DEVICE — STRAP OR TABLE 5IN X 72IN

## (undated) DEVICE — PACK CUSTOM LUMBAR LAM SLI

## (undated) DEVICE — ELECTRODE REM PLYHSV RETURN 9

## (undated) DEVICE — SLEEVE SCD EXPRESS KNEE MEDIUM

## (undated) DEVICE — SUT PROLENE 2-0 SH 36IN BLU

## (undated) DEVICE — GLOVE SENSICARE PI MICRO 7.5

## (undated) DEVICE — TAPE MEDIPORE 4IN X 2YDS

## (undated) DEVICE — TOWEL OR DISP STRL BLUE 4/PK

## (undated) DEVICE — SYS LABEL CORRECT MED

## (undated) DEVICE — DRAPE T TRNSVRS LAP 102X78X121

## (undated) DEVICE — GOWN POLY REINF BRTH SLV XL

## (undated) DEVICE — SUT BONE WAX 2.5 GRMS 12/BX

## (undated) DEVICE — BLADE SURG CARBON STEEL SZ11

## (undated) DEVICE — NDL BOX COUNTER

## (undated) DEVICE — DRAPE C ARM 42 X 120 10/BX

## (undated) DEVICE — TRAY CATH FOL SIL URIMTR 16FR

## (undated) DEVICE — COVER SURG LIGHT HANDLE

## (undated) DEVICE — APPLICATOR CHLORAPREP ORN 26ML

## (undated) DEVICE — DRESSING TELFA N ADH 3X8

## (undated) DEVICE — TOOL MR8 MATCH HEAD 14CM 3MM

## (undated) DEVICE — DRAPE OPMI STERILE

## (undated) DEVICE — SYR ONLY LUER LOCK 20CC

## (undated) DEVICE — BLADE 4IN EDGE INSULATED

## (undated) DEVICE — DECANTER FLUID TRNSF WHITE 9IN

## (undated) DEVICE — STAPLER SKIN ROTATING HEAD

## (undated) DEVICE — DRAPE INCISE IOBAN 2 23X17IN

## (undated) DEVICE — CATH IV INTROCAN 18G X 1 1/4

## (undated) DEVICE — NDL SPINAL 18GX3.5 SPINOCAN